# Patient Record
Sex: MALE | Race: ASIAN | NOT HISPANIC OR LATINO | URBAN - METROPOLITAN AREA
[De-identification: names, ages, dates, MRNs, and addresses within clinical notes are randomized per-mention and may not be internally consistent; named-entity substitution may affect disease eponyms.]

---

## 2019-04-01 ENCOUNTER — INPATIENT (INPATIENT)
Facility: HOSPITAL | Age: 74
LOS: 10 days | Discharge: HOME | DRG: 329 | End: 2019-04-12
Attending: SURGERY | Admitting: SURGERY
Payer: MEDICARE

## 2019-04-01 VITALS
TEMPERATURE: 98 F | WEIGHT: 134.92 LBS | DIASTOLIC BLOOD PRESSURE: 94 MMHG | RESPIRATION RATE: 18 BRPM | OXYGEN SATURATION: 100 % | SYSTOLIC BLOOD PRESSURE: 185 MMHG | HEIGHT: 67 IN | HEART RATE: 94 BPM

## 2019-04-01 DIAGNOSIS — K56.609 UNSPECIFIED INTESTINAL OBSTRUCTION, UNSPECIFIED AS TO PARTIAL VERSUS COMPLETE OBSTRUCTION: ICD-10-CM

## 2019-04-01 PROBLEM — Z00.00 ENCOUNTER FOR PREVENTIVE HEALTH EXAMINATION: Status: ACTIVE | Noted: 2019-04-01

## 2019-04-01 LAB
ALBUMIN SERPL ELPH-MCNC: 4.5 G/DL — SIGNIFICANT CHANGE UP (ref 3.3–5)
ALP SERPL-CCNC: 97 U/L — SIGNIFICANT CHANGE UP (ref 40–120)
ALT FLD-CCNC: 10 U/L — SIGNIFICANT CHANGE UP (ref 10–45)
ANION GAP SERPL CALC-SCNC: 13 MMOL/L — SIGNIFICANT CHANGE UP (ref 5–17)
APPEARANCE UR: CLEAR — SIGNIFICANT CHANGE UP
APPEARANCE UR: CLEAR — SIGNIFICANT CHANGE UP
APTT BLD: 31.5 SEC — SIGNIFICANT CHANGE UP (ref 27.5–36.3)
AST SERPL-CCNC: 12 U/L — SIGNIFICANT CHANGE UP (ref 10–40)
BACTERIA # UR AUTO: NEGATIVE — SIGNIFICANT CHANGE UP
BACTERIA # UR AUTO: NEGATIVE — SIGNIFICANT CHANGE UP
BASOPHILS # BLD AUTO: 0 K/UL — SIGNIFICANT CHANGE UP (ref 0–0.2)
BASOPHILS NFR BLD AUTO: 0.2 % — SIGNIFICANT CHANGE UP (ref 0–2)
BILIRUB SERPL-MCNC: 0.4 MG/DL — SIGNIFICANT CHANGE UP (ref 0.2–1.2)
BILIRUB UR-MCNC: NEGATIVE — SIGNIFICANT CHANGE UP
BILIRUB UR-MCNC: NEGATIVE — SIGNIFICANT CHANGE UP
BUN SERPL-MCNC: 16 MG/DL — SIGNIFICANT CHANGE UP (ref 7–23)
CALCIUM SERPL-MCNC: 9.7 MG/DL — SIGNIFICANT CHANGE UP (ref 8.4–10.5)
CHLORIDE SERPL-SCNC: 104 MMOL/L — SIGNIFICANT CHANGE UP (ref 96–108)
CO2 SERPL-SCNC: 24 MMOL/L — SIGNIFICANT CHANGE UP (ref 22–31)
COLOR SPEC: SIGNIFICANT CHANGE UP
COLOR SPEC: SIGNIFICANT CHANGE UP
CREAT SERPL-MCNC: 0.97 MG/DL — SIGNIFICANT CHANGE UP (ref 0.5–1.3)
DIFF PNL FLD: NEGATIVE — SIGNIFICANT CHANGE UP
DIFF PNL FLD: NEGATIVE — SIGNIFICANT CHANGE UP
EOSINOPHIL # BLD AUTO: 0.1 K/UL — SIGNIFICANT CHANGE UP (ref 0–0.5)
EOSINOPHIL NFR BLD AUTO: 1.3 % — SIGNIFICANT CHANGE UP (ref 0–6)
EPI CELLS # UR: 0 /HPF — SIGNIFICANT CHANGE UP
EPI CELLS # UR: 1 /HPF — SIGNIFICANT CHANGE UP
GAS PNL BLDV: SIGNIFICANT CHANGE UP
GLUCOSE SERPL-MCNC: 127 MG/DL — HIGH (ref 70–99)
GLUCOSE UR QL: NEGATIVE — SIGNIFICANT CHANGE UP
GLUCOSE UR QL: NEGATIVE — SIGNIFICANT CHANGE UP
HCT VFR BLD CALC: 33.4 % — LOW (ref 39–50)
HGB BLD-MCNC: 10.4 G/DL — LOW (ref 13–17)
HYALINE CASTS # UR AUTO: 0 /LPF — SIGNIFICANT CHANGE UP (ref 0–2)
HYALINE CASTS # UR AUTO: 1 /LPF — SIGNIFICANT CHANGE UP (ref 0–2)
INR BLD: 0.93 RATIO — SIGNIFICANT CHANGE UP (ref 0.88–1.16)
KETONES UR-MCNC: NEGATIVE — SIGNIFICANT CHANGE UP
KETONES UR-MCNC: NEGATIVE — SIGNIFICANT CHANGE UP
LEUKOCYTE ESTERASE UR-ACNC: NEGATIVE — SIGNIFICANT CHANGE UP
LEUKOCYTE ESTERASE UR-ACNC: NEGATIVE — SIGNIFICANT CHANGE UP
LIDOCAIN IGE QN: 18 U/L — SIGNIFICANT CHANGE UP (ref 7–60)
LYMPHOCYTES # BLD AUTO: 2.6 K/UL — SIGNIFICANT CHANGE UP (ref 1–3.3)
LYMPHOCYTES # BLD AUTO: 25 % — SIGNIFICANT CHANGE UP (ref 13–44)
MCHC RBC-ENTMCNC: 20.1 PG — LOW (ref 27–34)
MCHC RBC-ENTMCNC: 31.1 GM/DL — LOW (ref 32–36)
MCV RBC AUTO: 64.5 FL — LOW (ref 80–100)
MONOCYTES # BLD AUTO: 0.6 K/UL — SIGNIFICANT CHANGE UP (ref 0–0.9)
MONOCYTES NFR BLD AUTO: 5.7 % — SIGNIFICANT CHANGE UP (ref 2–14)
NEUTROPHILS # BLD AUTO: 7 K/UL — SIGNIFICANT CHANGE UP (ref 1.8–7.4)
NEUTROPHILS NFR BLD AUTO: 67.8 % — SIGNIFICANT CHANGE UP (ref 43–77)
NITRITE UR-MCNC: NEGATIVE — SIGNIFICANT CHANGE UP
NITRITE UR-MCNC: NEGATIVE — SIGNIFICANT CHANGE UP
PH UR: 6 — SIGNIFICANT CHANGE UP (ref 5–8)
PH UR: 6.5 — SIGNIFICANT CHANGE UP (ref 5–8)
PLATELET # BLD AUTO: 368 K/UL — SIGNIFICANT CHANGE UP (ref 150–400)
POTASSIUM SERPL-MCNC: 4.3 MMOL/L — SIGNIFICANT CHANGE UP (ref 3.5–5.3)
POTASSIUM SERPL-SCNC: 4.3 MMOL/L — SIGNIFICANT CHANGE UP (ref 3.5–5.3)
PROT SERPL-MCNC: 7.5 G/DL — SIGNIFICANT CHANGE UP (ref 6–8.3)
PROT UR-MCNC: ABNORMAL
PROT UR-MCNC: ABNORMAL
PROTHROM AB SERPL-ACNC: 10.6 SEC — SIGNIFICANT CHANGE UP (ref 10–12.9)
RBC # BLD: 5.17 M/UL — SIGNIFICANT CHANGE UP (ref 4.2–5.8)
RBC # FLD: 16.1 % — HIGH (ref 10.3–14.5)
RBC CASTS # UR COMP ASSIST: 1 /HPF — SIGNIFICANT CHANGE UP (ref 0–4)
RBC CASTS # UR COMP ASSIST: 2 /HPF — SIGNIFICANT CHANGE UP (ref 0–4)
SODIUM SERPL-SCNC: 141 MMOL/L — SIGNIFICANT CHANGE UP (ref 135–145)
SP GR SPEC: 1.03 — HIGH (ref 1.01–1.02)
SP GR SPEC: >1.05 (ref 1.01–1.02)
UROBILINOGEN FLD QL: NEGATIVE — SIGNIFICANT CHANGE UP
UROBILINOGEN FLD QL: NEGATIVE — SIGNIFICANT CHANGE UP
WBC # BLD: 10.4 K/UL — SIGNIFICANT CHANGE UP (ref 3.8–10.5)
WBC # FLD AUTO: 10.4 K/UL — SIGNIFICANT CHANGE UP (ref 3.8–10.5)
WBC UR QL: 1 /HPF — SIGNIFICANT CHANGE UP (ref 0–5)
WBC UR QL: 1 /HPF — SIGNIFICANT CHANGE UP (ref 0–5)

## 2019-04-01 PROCEDURE — 93010 ELECTROCARDIOGRAM REPORT: CPT | Mod: 59

## 2019-04-01 PROCEDURE — 99284 EMERGENCY DEPT VISIT MOD MDM: CPT | Mod: 25

## 2019-04-01 PROCEDURE — 74177 CT ABD & PELVIS W/CONTRAST: CPT | Mod: 26

## 2019-04-01 PROCEDURE — 71045 X-RAY EXAM CHEST 1 VIEW: CPT | Mod: 26

## 2019-04-01 PROCEDURE — 43753 TX GASTRO INTUB W/ASP: CPT

## 2019-04-01 RX ORDER — ONDANSETRON 8 MG/1
4 TABLET, FILM COATED ORAL ONCE
Qty: 0 | Refills: 0 | Status: COMPLETED | OUTPATIENT
Start: 2019-04-01 | End: 2019-04-01

## 2019-04-01 RX ORDER — MORPHINE SULFATE 50 MG/1
4 CAPSULE, EXTENDED RELEASE ORAL ONCE
Qty: 0 | Refills: 0 | Status: DISCONTINUED | OUTPATIENT
Start: 2019-04-01 | End: 2019-04-01

## 2019-04-01 RX ORDER — ONDANSETRON 8 MG/1
4 TABLET, FILM COATED ORAL ONCE
Qty: 0 | Refills: 0 | Status: DISCONTINUED | OUTPATIENT
Start: 2019-04-01 | End: 2019-04-04

## 2019-04-01 RX ORDER — ENOXAPARIN SODIUM 100 MG/ML
40 INJECTION SUBCUTANEOUS DAILY
Qty: 0 | Refills: 0 | Status: DISCONTINUED | OUTPATIENT
Start: 2019-04-01 | End: 2019-04-09

## 2019-04-01 RX ORDER — MORPHINE SULFATE 50 MG/1
4 CAPSULE, EXTENDED RELEASE ORAL ONCE
Qty: 0 | Refills: 0 | Status: DISCONTINUED | OUTPATIENT
Start: 2019-04-01 | End: 2019-04-04

## 2019-04-01 RX ORDER — MORPHINE SULFATE 50 MG/1
2 CAPSULE, EXTENDED RELEASE ORAL ONCE
Qty: 0 | Refills: 0 | Status: DISCONTINUED | OUTPATIENT
Start: 2019-04-01 | End: 2019-04-01

## 2019-04-01 RX ORDER — SODIUM CHLORIDE 9 MG/ML
1000 INJECTION INTRAMUSCULAR; INTRAVENOUS; SUBCUTANEOUS ONCE
Qty: 0 | Refills: 0 | Status: COMPLETED | OUTPATIENT
Start: 2019-04-01 | End: 2019-04-01

## 2019-04-01 RX ORDER — SODIUM CHLORIDE 9 MG/ML
1000 INJECTION, SOLUTION INTRAVENOUS
Qty: 0 | Refills: 0 | Status: DISCONTINUED | OUTPATIENT
Start: 2019-04-01 | End: 2019-04-05

## 2019-04-01 RX ADMIN — MORPHINE SULFATE 2 MILLIGRAM(S): 50 CAPSULE, EXTENDED RELEASE ORAL at 07:00

## 2019-04-01 RX ADMIN — MORPHINE SULFATE 4 MILLIGRAM(S): 50 CAPSULE, EXTENDED RELEASE ORAL at 11:41

## 2019-04-01 RX ADMIN — ONDANSETRON 4 MILLIGRAM(S): 8 TABLET, FILM COATED ORAL at 11:41

## 2019-04-01 RX ADMIN — SODIUM CHLORIDE 1000 MILLILITER(S): 9 INJECTION INTRAMUSCULAR; INTRAVENOUS; SUBCUTANEOUS at 10:25

## 2019-04-01 RX ADMIN — MORPHINE SULFATE 4 MILLIGRAM(S): 50 CAPSULE, EXTENDED RELEASE ORAL at 12:12

## 2019-04-01 RX ADMIN — MORPHINE SULFATE 2 MILLIGRAM(S): 50 CAPSULE, EXTENDED RELEASE ORAL at 08:41

## 2019-04-01 NOTE — H&P ADULT - ASSESSMENT
73M w/ no PMHx presents with acute abdominal pain x12 hours concerning for small bowel obstruction secondary to newly imaged 5 cm cecal mass.     - Admit to green team surgery under Dr. Bhat  - NPO + IVF  - NGT placed in ED, to LCWS  - No standing pain medication, requires surgical team evaluation prior to pain medication dosing   - No home medications  - Lovenox for DVT prophylaxis    Discussed with Dr. Perlita Marsh PGY-2  Surgery Pager [phone]

## 2019-04-01 NOTE — ED ADULT NURSE NOTE - OBJECTIVE STATEMENT
74y/o male walked into ED a&ox3 c/o abdominal pain. patient Tamazight speaking, MD Falcon translating and daughter at bedside assisting. Patient has been having abdominal pain intermittently for about a month. Reports last night pain became worse than usual, unable to sleep throughout the night. Described as aching pain to upper abdomen b/l. Denies fever, N/V/D, urinary symptoms, back pain, SOB, CP or any other complaints. Lungs clear b/l. Abd soft, nontender, nondistended. MD Falcon and MD Howell at bedside.

## 2019-04-01 NOTE — ED PROVIDER NOTE - CARE PLAN
Principal Discharge DX:	Small bowel obstruction  Secondary Diagnosis:	Malignant neoplasm of ascending colon

## 2019-04-01 NOTE — ED ADULT NURSE NOTE - NSIMPLEMENTINTERV_GEN_ALL_ED
Implemented All Universal Safety Interventions:  [place] to call system. Call bell, personal items and telephone within reach. Instruct patient to call for assistance. Room bathroom lighting operational. Non-slip footwear when patient is off stretcher. Physically safe environment: no spills, clutter or unnecessary equipment. Stretcher in lowest position, wheels locked, appropriate side rails in place.

## 2019-04-01 NOTE — H&P ADULT - ATTENDING COMMENTS
I have seen and examined the patient.  I agree with the surgical resident's note.  I have reviewed the CT scan.  I have discussed the treatment plan with the family and the patient.    Hugo Bhat contact information [phone]

## 2019-04-01 NOTE — ED ADULT NURSE REASSESSMENT NOTE - NS ED NURSE REASSESS COMMENT FT1
pt had NGT placed successfully and tolerated well. c/o 9/10 abdominal pain and discomfort, medicated with an additional 4 mg of morphine and 4 mg of zofran. VS stable. pt pending admission for mass found on ct scan. safety and fall precautions maintained. call bell at the bedside and within reach.

## 2019-04-01 NOTE — H&P ADULT - NSHPPHYSICALEXAM_GEN_ALL_CORE
General: NAD, resting comfortably  Resp: unlabored breathing on RA  CV: HDS, rrr  Abd: soft, generalized tenderness but worst in epigastrium, distended  Extr: wwp

## 2019-04-01 NOTE — H&P ADULT - HISTORY OF PRESENT ILLNESS
Patient seen and examined, full note to follow. Will place NGT, possible OR this week.     China Marsh PGY-2  Surgery Pager [phone] 73M w/ no PMHx presents with acute abdominal pain x12 hours. He states that the pain began last night around 8:30 PM and was generalized, worst in the epigastrium. He also noted that his abdomen was distended. 73M w/ no PMHx presents with acute abdominal pain x12 hours. He states that the pain began last night around 8:30 PM and was generalized, worst in the epigastrium. He also noted that his abdomen was distended and felt nauseated no emesis. He has never had this pain before. He denies any recent fevers, chills, CP/SOB, diarrhea/constipation. Last BM/flatus was yesterday PM. Patient has never had a colonoscopy.     In ED, patient's labs notable for leukocytosis to 10.4, venous lactate 1.7. CT abdomen pelvis demonstrates small bowel obstruction with transition point at level of ileocecal valve, associated with 5 cm cecal mass.

## 2019-04-01 NOTE — ED PROVIDER NOTE - PHYSICAL EXAMINATION
General: well-appearing elderly man in no acute distress  Head: normocephalic, atraumatic  Eyes: PERRL   Mouth: moist mucous membranes  Neck: supple neck  CV: normal rate and rhythm, normal S1 and S2  Respiratory: clear to auscultation bilaterally  Abdomen: soft, nontender, nondistended, bowel sounds present x 4 quadrants  Back: no midline tenderness to palpation, no CVAT  Neuro: alert and oriented x3, speech clear, moving all extremities spontaneously  Skin: no rash on abdomen or chest  Extremities: peripheral pulses 2+ bilaterally

## 2019-04-01 NOTE — ED PROVIDER NOTE - OBJECTIVE STATEMENT
72yo man presents with acute abdominal pain x 12 hours in setting of chronic abdominal pain x 1-2 months. His typical abdominal pain is intermittent but came to ED today because after dinner last night at 6:30, pt had continuous bilateral upper quadrant stabbing sensation throughout the night and was unable to sleep. He took 1g tylenol at 1030pm and then again at 3am with no relief. No fever or chills, no n/v/d, no dysuria, no hematuria. Normal BM yesterday with no blood in stool. No chest pain, no SOB, no back pain.

## 2019-04-01 NOTE — ED PROVIDER NOTE - ATTENDING CONTRIBUTION TO CARE
Patient presenting complaining of upper abdominal pains.  Pains have been ongoing intermittently for some time, usually responds to APAP at home, last night became more severe.  Sharp/stabbing in nature.  No associated nausea or vomiting, non radiating.  No prior evaluations or workups for etiology of pain.  No fevers, no chills.    A 14 point review of systems is negative except as in HPI or otherwise documented.    Exam:  General: Patient well appearing, vital signs within normal limits  HEENT: airway patent with moist mucous membranes  Cardiac: RRR S1/S2 with strong peripheral pulses  Respiratory: lungs clear without respiratory distress  GI: abdomen soft, non tender, non distended, unable to elicit symptoms on exam, no palpable masses  Neuro: no gross neurologic deficits  Skin: warm, well perfused  Psych: normal mood and affect    Patient presenting with epigastric/periumbilical abdominal pains not reproducible on exam, cause of pain unclear, however patient without good follow up and am concerned about patients reported severity of pain given exam - plan for labs, lactate, lipase, UA, pain control, CT A/P Patient presenting complaining of upper abdominal pains.  Pains have been ongoing intermittently for some time, usually responds to APAP at home, last night became more severe.  Sharp/stabbing in nature.  No associated nausea or vomiting, non radiating.  No prior evaluations or workups for etiology of pain.  No fevers, no chills.  Denying associated chest pains, shortness of breath, back pains.    A 14 point review of systems is negative except as in HPI or otherwise documented.    Exam:  General: Patient well appearing, vital signs within normal limits  HEENT: airway patent with moist mucous membranes  Cardiac: RRR S1/S2 with strong peripheral pulses  Respiratory: lungs clear without respiratory distress  GI: abdomen soft, non tender, non distended, unable to elicit symptoms on exam, no palpable masses  Neuro: no gross neurologic deficits  Skin: warm, well perfused  Psych: normal mood and affect    Patient presenting with epigastric/periumbilical abdominal pains not reproducible on exam, cause of pain unclear, however patient without good follow up and am concerned about patients reported severity of pain given exam - plan for labs, lactate, lipase, UA, pain control, CT A/P

## 2019-04-01 NOTE — ED PROVIDER NOTE - CLINICAL SUMMARY MEDICAL DECISION MAKING FREE TEXT BOX
72 yo man presents with abdominal pain of stabbing sensation that acutely worsened after eating and inability to sleep overnight and soft, nontender abdomen on exam. Will obtain cbc, cmp, lipase, vbg with lactate, and imaging to r/o abdominal pathology, may require cta to r/o mesenteric ischemia and will obtain ecg to assess or arrhythmia. Will pain control and continue to monitor and reassess.

## 2019-04-01 NOTE — ED PROVIDER NOTE - PROGRESS NOTE DETAILS
MD Mckinnon:  patient signed out to me by Dr. Howell.  74 yo M, c/o months diffuse abd pain; worse last night.  Location: upper > lower.  Better/worse: no clear modifiers.  Physical Exam unremarkable/nonfocal.  VS: +HTN.  DDx:  gastritis, pancreatitis, CA.  Plan:  labs, CT, ecg, UA (all pending). MD Mckinnon:  patient signed out to me by Dr. Howell.  74 yo M, c/o months diffuse abd pain; worse last night.  Location: upper > lower.  Better/worse: no clear modifiers.  Physical Exam unremarkable/nonfocal.  VS: +HTN.  DDx:  gastritis, pancreatitis, CA.  Hemoglobin 10.  Lactate normal.  ECG - unremarkable.  Plan:  f/u labs, CT, UA. PETTET- rectal exam chaperone romeo wnl, no masses or blood in rectum, updated family with results of ct scan and surg consulted.

## 2019-04-01 NOTE — H&P ADULT - NSHPLABSRESULTS_GEN_ALL_CORE
CBC ( @ 07:16)                          10.4<L>                   10.4    )--------------(  368        67.8  % Neuts, 25.0  % Lymphs, ANC: 7.0                             33.4<L>    BMP ( @ 07:16)       141     |  104     |  16    			Ca++ --      Ca 9.7          ---------------------------------( 127<H>		Mg --           4.3     |  24      |  0.97  			Ph --        LFTs ( @ 07:16)      TPro 7.5 / Alb 4.5 / TBili 0.4 / DBili -- / AST 12 / ALT 10 / AlkPhos 97    Coags ( @ 07:16)  aPTT 31.5 / INR 0.93 / PT 10.6        VBG ( @ 07:16)     7.40 / 44 / 44 / 27 / 2.4<H> / 71%      Lactate: 1.7    Urinalysis ( @ 09:48):     Color: Light Yellow / Appearance: Clear / S.031<H> / pH: 6.0 / Gluc: Negative / Ketones: Negative / Bili: Negative / Urobili: Negative / Protein :30 mg/dL<!> / Nitrites: Negative / Leuk.Est: Negative / RBC: 2 / WBC: 1 / Sq Epi:  / Non Sq Epi: 0 / Bacteria Negative    < from: CT Abdomen and Pelvis w/ Oral Cont and w/ IV Cont (19 @ 08:44) >    *** ADDENDUM 2019  ***    Correction of transcriptional error:    *  Small bowel obstruction secondary to 5 cm cecal mass at the level of   the  ileocecal valve.      *** END OF ADDENDUM 2019  ***      PROCEDURE DATE:  2019      INTERPRETATION:  CLINICAL INFORMATION: Abdominal pain. Upper abdominal   pain.         COMPARISON: None.    PROCEDURE:   CT of the Abdomen and Pelvis was performed with intravenous contrast.   Intravenous contrast: 90 ml Omnipaque 350. 10 ml discarded.  Oral contrast: positive contrast was administered.  Sagittal and coronal reformats were performed.    FINDINGS:    LOWER CHEST: Within normal limits.    LIVER: Within normal limits.  BILE DUCTS: Normal caliber.  GALLBLADDER: Within normal limits.  SPLEEN: Within normal limits.  PANCREAS: Within normal limits.  ADRENALS: Within normal limits.  KIDNEYS/URETERS: Left renal hypodensity too small to characterize, likely   a cyst. Otherwise within normal limits.    BLADDER: Within normal limits.  REPRODUCTIVE ORGANS: Enlarged prostate.    BOWEL: There is dilatation of distal small bowel loops with fecaliization   of the distal ileal contents to the level of the cecum where 5 cm mass is   present at the level of the ileocecal valve. There is increase in size   and number of mesenteric lymph nodes in the right lower quadrant the   largest measuring 1.1 cm (3:62) and 0.9 cm (3:58). Appendix is normal.   Diverticulosis.  PERITONEUM: Trace pelvic ascites. No pneumoperitoneum.  VESSELS:  Atherosclerotic calcifications.  RETROPERITONEUM: No lymphadenopathy.    ABDOMINAL WALL: Within normal limits.  BONES: Within normal limits.    IMPRESSION:     *  Small bowel obstruction secondary to 5 cm stable mass at the level of   the ileocecal valve.  *  Right lower quadrant is apparent adenopathy. No evidence of metastatic   disease in the abdomen and pelvis.  *  Trace pelvic ascites.    ***Please see the addendum at the top of this report. It may contain   additional important information or changes.****    ROMAN ROSE M.D., ATTENDING RADIOLOGIST  This document has been electronically signed. 2019  9:13AM  Addend:  ROMAN ROSE M.D., ATTENDING RADIOLOGIST  This addendum was electronically signed on: 2019  9:49AM.        < end of copied text >

## 2019-04-02 PROBLEM — Z78.9 OTHER SPECIFIED HEALTH STATUS: Chronic | Status: ACTIVE | Noted: 2019-04-01

## 2019-04-02 LAB
ANION GAP SERPL CALC-SCNC: 11 MMOL/L — SIGNIFICANT CHANGE UP (ref 5–17)
APTT BLD: 28.9 SEC — SIGNIFICANT CHANGE UP (ref 27.5–36.3)
APTT BLD: 29.4 SEC — SIGNIFICANT CHANGE UP (ref 27.5–36.3)
BLD GP AB SCN SERPL QL: NEGATIVE — SIGNIFICANT CHANGE UP
BUN SERPL-MCNC: 11 MG/DL — SIGNIFICANT CHANGE UP (ref 7–23)
CALCIUM SERPL-MCNC: 8.6 MG/DL — SIGNIFICANT CHANGE UP (ref 8.4–10.5)
CEA SERPL-MCNC: 0.9 NG/ML — SIGNIFICANT CHANGE UP (ref 0–3.8)
CHLORIDE SERPL-SCNC: 101 MMOL/L — SIGNIFICANT CHANGE UP (ref 96–108)
CO2 SERPL-SCNC: 26 MMOL/L — SIGNIFICANT CHANGE UP (ref 22–31)
CREAT SERPL-MCNC: 0.92 MG/DL — SIGNIFICANT CHANGE UP (ref 0.5–1.3)
CULTURE RESULTS: SIGNIFICANT CHANGE UP
GLUCOSE SERPL-MCNC: 96 MG/DL — SIGNIFICANT CHANGE UP (ref 70–99)
HCT VFR BLD CALC: 27.6 % — LOW (ref 39–50)
HGB BLD-MCNC: 9.2 G/DL — LOW (ref 13–17)
INR BLD: 1.03 RATIO — SIGNIFICANT CHANGE UP (ref 0.88–1.16)
INR BLD: 1.03 RATIO — SIGNIFICANT CHANGE UP (ref 0.88–1.16)
MAGNESIUM SERPL-MCNC: 2 MG/DL — SIGNIFICANT CHANGE UP (ref 1.6–2.6)
MCHC RBC-ENTMCNC: 21.6 PG — LOW (ref 27–34)
MCHC RBC-ENTMCNC: 33.2 GM/DL — SIGNIFICANT CHANGE UP (ref 32–36)
MCV RBC AUTO: 65.1 FL — LOW (ref 80–100)
PHOSPHATE SERPL-MCNC: 2.2 MG/DL — LOW (ref 2.5–4.5)
PLATELET # BLD AUTO: 296 K/UL — SIGNIFICANT CHANGE UP (ref 150–400)
POTASSIUM SERPL-MCNC: 3.7 MMOL/L — SIGNIFICANT CHANGE UP (ref 3.5–5.3)
POTASSIUM SERPL-SCNC: 3.7 MMOL/L — SIGNIFICANT CHANGE UP (ref 3.5–5.3)
PROTHROM AB SERPL-ACNC: 11.8 SEC — SIGNIFICANT CHANGE UP (ref 10–12.9)
PROTHROM AB SERPL-ACNC: 11.8 SEC — SIGNIFICANT CHANGE UP (ref 10–12.9)
RBC # BLD: 4.24 M/UL — SIGNIFICANT CHANGE UP (ref 4.2–5.8)
RBC # FLD: 16.1 % — HIGH (ref 10.3–14.5)
RH IG SCN BLD-IMP: POSITIVE — SIGNIFICANT CHANGE UP
SODIUM SERPL-SCNC: 138 MMOL/L — SIGNIFICANT CHANGE UP (ref 135–145)
SPECIMEN SOURCE: SIGNIFICANT CHANGE UP
WBC # BLD: 8.9 K/UL — SIGNIFICANT CHANGE UP (ref 3.8–10.5)
WBC # FLD AUTO: 8.9 K/UL — SIGNIFICANT CHANGE UP (ref 3.8–10.5)

## 2019-04-02 PROCEDURE — 71250 CT THORAX DX C-: CPT | Mod: 26

## 2019-04-02 RX ORDER — POTASSIUM PHOSPHATE, MONOBASIC POTASSIUM PHOSPHATE, DIBASIC 236; 224 MG/ML; MG/ML
15 INJECTION, SOLUTION INTRAVENOUS ONCE
Qty: 0 | Refills: 0 | Status: COMPLETED | OUTPATIENT
Start: 2019-04-02 | End: 2019-04-02

## 2019-04-02 RX ADMIN — POTASSIUM PHOSPHATE, MONOBASIC POTASSIUM PHOSPHATE, DIBASIC 62.5 MILLIMOLE(S): 236; 224 INJECTION, SOLUTION INTRAVENOUS at 10:47

## 2019-04-02 RX ADMIN — ENOXAPARIN SODIUM 40 MILLIGRAM(S): 100 INJECTION SUBCUTANEOUS at 11:55

## 2019-04-02 NOTE — CHART NOTE - NSCHARTNOTEFT_GEN_A_CORE
Surgery Preop Note    Patient is a 73y old Male who presents with small bowel obstruction secondary to a cecal mass  Diagnosis: small bowel obstruction secondary to a cecal mass  Procedure: laparoscopic right colectomy (ERAS)  Surgeon: Dr. Painter                        9.2    8.9   )-----------( 296      ( 02 Apr 2019 06:42 )             27.6     04-02    138  |  101  |  11  ----------------------------<  96  3.7   |  26  |  0.92    Ca    8.6      02 Apr 2019 06:42  Phos  2.2     04-02  Mg     2.0     04-02    TPro  7.5  /  Alb  4.5  /  TBili  0.4  /  DBili  x   /  AST  12  /  ALT  10  /  AlkPhos  97  04-01    PT/INR - ( 02 Apr 2019 11:02 )   PT: 11.8 sec;   INR: 1.03 ratio         PTT - ( 02 Apr 2019 11:02 )  PTT:29.4 sec  ABO Interpretation: A (04-02 @ 06:50)    Urinalysis Basic - ( 01 Apr 2019 18:55 )    Color: Light Yellow / Appearance: Clear / SG: >1.050 / pH: x  Gluc: x / Ketone: Negative  / Bili: Negative / Urobili: Negative   Blood: x / Protein: Trace / Nitrite: Negative   Leuk Esterase: Negative / RBC: 1 /hpf / WBC 1 /HPF   Sq Epi: x / Non Sq Epi: 1 /hpf / Bacteria: Negative    CT chest: No evidence of intrathoracic metastatic disease.  Chest X RAY: Enteric tube terminates in the stomach, likely within the pylorus.  Clear lungs.  EKG: NSR     MEDICATIONS  (STANDING):  enoxaparin Injectable 40 milliGRAM(s) SubCutaneous daily  lactated ringers. 1000 milliLiter(s) (100 mL/Hr) IV Continuous <Continuous>  morphine  - Injectable 4 milliGRAM(s) IV Push once  ondansetron Injectable 4 milliGRAM(s) IV Push once    MEDICATIONS  (PRN):      Assessment & Plan:  73y Male with   NPO after midnight, IVF  NGT to WS  F/u AM Labs (5am stat labs)   OR tomorrow for lap right colectomy  Pain Control  DVT prophylaxis

## 2019-04-02 NOTE — PROGRESS NOTE ADULT - ATTENDING COMMENTS
I saw and examined the pt and discussed the tx plan with the House Staff. I agree with the exam and plan as documented in the surgery resident's note from today.  Comfortable.  Abdomen soft, NT, mildly distended.  keep NGT, NPO, IVFs.  Staging non-con chest CT today to complete cancer staging.  Looking for OR time, tentatively for wedn afternoon.  Sarah Painter MD I saw and examined the pt and discussed the tx plan with the House Staff. I agree with the exam and plan as documented in the surgery resident's note from today, with changes in exam assessment as below.  Comfortable.  Abdomen soft, NT, mildly distended.  keep NGT, NPO, IVFs.  Staging non-con chest CT today to complete cancer staging.  Looking for OR time, tentatively for wedn afternoon.  Sarah Painter MD

## 2019-04-02 NOTE — PROGRESS NOTE ADULT - ASSESSMENT
73M w/ no PMHx presents with acute abdominal pain concerning for small bowel obstruction secondary to newly imaged 5 cm cecal mass.     - NPO + IVF  - NGT  - No standing pain medication, requires surgical team evaluation prior to pain medication dosing   - No home medications  - Lovenox for DVT prophylaxis    Surgery Pager [phone]

## 2019-04-02 NOTE — PROGRESS NOTE ADULT - SUBJECTIVE AND OBJECTIVE BOX
GENERAL SURGERY DAILY PROGRESS NOTE:       Subjective:  Pt seen and examined at bedside. No acute events overnight. Pain controlled. Tolerating diet. (-)N/V, (+) flatus/BM.         Objective:    PE:  Gen: resting comfortably in bed, NAD  Resp: breathing comfortably  Abd: soft, generalized tenderness but worst in epigastrium, distended    Vital Signs Last 24 Hrs  T(C): 36.7 (02 Apr 2019 01:08), Max: 36.8 (01 Apr 2019 18:15)  T(F): 98.1 (02 Apr 2019 01:08), Max: 98.3 (01 Apr 2019 18:15)  HR: 77 (02 Apr 2019 01:08) (76 - 94)  BP: 147/78 (02 Apr 2019 01:08) (147/78 - 185/94)  BP(mean): --  RR: 18 (02 Apr 2019 01:08) (16 - 18)  SpO2: 95% (02 Apr 2019 01:08) (94% - 100%)    I&O's Detail    01 Apr 2019 07:01  -  02 Apr 2019 05:15  --------------------------------------------------------  IN:    lactated ringers.: 1400 mL  Total IN: 1400 mL    OUT:    Intermittent Catheterization - Urethral: 500 mL    Nasoenteral Tube: 500 mL    Voided: 500 mL  Total OUT: 1500 mL    Total NET: -100 mL          Daily Height in cm: 170.18 (01 Apr 2019 06:32)    Daily     MEDICATIONS  (STANDING):  enoxaparin Injectable 40 milliGRAM(s) SubCutaneous daily  lactated ringers. 1000 milliLiter(s) (100 mL/Hr) IV Continuous <Continuous>  morphine  - Injectable 4 milliGRAM(s) IV Push once  ondansetron Injectable 4 milliGRAM(s) IV Push once    MEDICATIONS  (PRN):      LABS:                        10.4   10.4  )-----------( 368      ( 01 Apr 2019 07:16 )             33.4     04-01    141  |  104  |  16  ----------------------------<  127<H>  4.3   |  24  |  0.97    Ca    9.7      01 Apr 2019 07:16    TPro  7.5  /  Alb  4.5  /  TBili  0.4  /  DBili  x   /  AST  12  /  ALT  10  /  AlkPhos  97  04-01    PT/INR - ( 01 Apr 2019 07:16 )   PT: 10.6 sec;   INR: 0.93 ratio         PTT - ( 01 Apr 2019 07:16 )  PTT:31.5 sec  Urinalysis Basic - ( 01 Apr 2019 18:55 )    Color: Light Yellow / Appearance: Clear / SG: >1.050 / pH: x  Gluc: x / Ketone: Negative  / Bili: Negative / Urobili: Negative   Blood: x / Protein: Trace / Nitrite: Negative   Leuk Esterase: Negative / RBC: 1 /hpf / WBC 1 /HPF   Sq Epi: x / Non Sq Epi: 1 /hpf / Bacteria: Negative        RADIOLOGY & ADDITIONAL STUDIES: GENERAL SURGERY DAILY PROGRESS NOTE:       Subjective:  Pt seen and examined at bedside. No acute events overnight. Pain controlled. (-)N/V, (-) flatus/BM.         Objective:    PE:  Gen: resting comfortably in bed, NAD  Resp: breathing comfortably  Abd: soft, generalized tenderness but worst in epigastrium, distended    Vital Signs Last 24 Hrs  T(C): 36.7 (02 Apr 2019 01:08), Max: 36.8 (01 Apr 2019 18:15)  T(F): 98.1 (02 Apr 2019 01:08), Max: 98.3 (01 Apr 2019 18:15)  HR: 77 (02 Apr 2019 01:08) (76 - 94)  BP: 147/78 (02 Apr 2019 01:08) (147/78 - 185/94)  BP(mean): --  RR: 18 (02 Apr 2019 01:08) (16 - 18)  SpO2: 95% (02 Apr 2019 01:08) (94% - 100%)    I&O's Detail    01 Apr 2019 07:01  -  02 Apr 2019 05:15  --------------------------------------------------------  IN:    lactated ringers.: 1400 mL  Total IN: 1400 mL    OUT:    Intermittent Catheterization - Urethral: 500 mL    Nasoenteral Tube: 500 mL    Voided: 500 mL  Total OUT: 1500 mL    Total NET: -100 mL          Daily Height in cm: 170.18 (01 Apr 2019 06:32)    Daily     MEDICATIONS  (STANDING):  enoxaparin Injectable 40 milliGRAM(s) SubCutaneous daily  lactated ringers. 1000 milliLiter(s) (100 mL/Hr) IV Continuous <Continuous>  morphine  - Injectable 4 milliGRAM(s) IV Push once  ondansetron Injectable 4 milliGRAM(s) IV Push once    MEDICATIONS  (PRN):      LABS:                        10.4   10.4  )-----------( 368      ( 01 Apr 2019 07:16 )             33.4     04-01    141  |  104  |  16  ----------------------------<  127<H>  4.3   |  24  |  0.97    Ca    9.7      01 Apr 2019 07:16    TPro  7.5  /  Alb  4.5  /  TBili  0.4  /  DBili  x   /  AST  12  /  ALT  10  /  AlkPhos  97  04-01    PT/INR - ( 01 Apr 2019 07:16 )   PT: 10.6 sec;   INR: 0.93 ratio         PTT - ( 01 Apr 2019 07:16 )  PTT:31.5 sec  Urinalysis Basic - ( 01 Apr 2019 18:55 )    Color: Light Yellow / Appearance: Clear / SG: >1.050 / pH: x  Gluc: x / Ketone: Negative  / Bili: Negative / Urobili: Negative   Blood: x / Protein: Trace / Nitrite: Negative   Leuk Esterase: Negative / RBC: 1 /hpf / WBC 1 /HPF   Sq Epi: x / Non Sq Epi: 1 /hpf / Bacteria: Negative        RADIOLOGY & ADDITIONAL STUDIES:

## 2019-04-03 ENCOUNTER — APPOINTMENT (OUTPATIENT)
Dept: SURGERY | Facility: HOSPITAL | Age: 74
End: 2019-04-03

## 2019-04-03 LAB
ANION GAP SERPL CALC-SCNC: 12 MMOL/L — SIGNIFICANT CHANGE UP (ref 5–17)
ANION GAP SERPL CALC-SCNC: 15 MMOL/L — SIGNIFICANT CHANGE UP (ref 5–17)
ANION GAP SERPL CALC-SCNC: 17 MMOL/L — SIGNIFICANT CHANGE UP (ref 5–17)
APPEARANCE UR: ABNORMAL
APTT BLD: 31.8 SEC — SIGNIFICANT CHANGE UP (ref 27.5–36.3)
BACTERIA # UR AUTO: ABNORMAL
BILIRUB UR-MCNC: NEGATIVE — SIGNIFICANT CHANGE UP
BLD GP AB SCN SERPL QL: NEGATIVE — SIGNIFICANT CHANGE UP
BUN SERPL-MCNC: 13 MG/DL — SIGNIFICANT CHANGE UP (ref 7–23)
BUN SERPL-MCNC: 14 MG/DL — SIGNIFICANT CHANGE UP (ref 7–23)
BUN SERPL-MCNC: 14 MG/DL — SIGNIFICANT CHANGE UP (ref 7–23)
CALCIUM SERPL-MCNC: 8 MG/DL — LOW (ref 8.4–10.5)
CALCIUM SERPL-MCNC: 8.7 MG/DL — SIGNIFICANT CHANGE UP (ref 8.4–10.5)
CALCIUM SERPL-MCNC: 9.3 MG/DL — SIGNIFICANT CHANGE UP (ref 8.4–10.5)
CHLORIDE SERPL-SCNC: 101 MMOL/L — SIGNIFICANT CHANGE UP (ref 96–108)
CHLORIDE SERPL-SCNC: 98 MMOL/L — SIGNIFICANT CHANGE UP (ref 96–108)
CHLORIDE SERPL-SCNC: 98 MMOL/L — SIGNIFICANT CHANGE UP (ref 96–108)
CO2 SERPL-SCNC: 23 MMOL/L — SIGNIFICANT CHANGE UP (ref 22–31)
CO2 SERPL-SCNC: 24 MMOL/L — SIGNIFICANT CHANGE UP (ref 22–31)
CO2 SERPL-SCNC: 25 MMOL/L — SIGNIFICANT CHANGE UP (ref 22–31)
COLOR SPEC: YELLOW — SIGNIFICANT CHANGE UP
CREAT SERPL-MCNC: 0.9 MG/DL — SIGNIFICANT CHANGE UP (ref 0.5–1.3)
CREAT SERPL-MCNC: 0.91 MG/DL — SIGNIFICANT CHANGE UP (ref 0.5–1.3)
CREAT SERPL-MCNC: 0.94 MG/DL — SIGNIFICANT CHANGE UP (ref 0.5–1.3)
DIFF PNL FLD: ABNORMAL
EPI CELLS # UR: 1 /HPF — SIGNIFICANT CHANGE UP
GLUCOSE SERPL-MCNC: 104 MG/DL — HIGH (ref 70–99)
GLUCOSE SERPL-MCNC: 111 MG/DL — HIGH (ref 70–99)
GLUCOSE SERPL-MCNC: 84 MG/DL — SIGNIFICANT CHANGE UP (ref 70–99)
GLUCOSE UR QL: NEGATIVE — SIGNIFICANT CHANGE UP
HCT VFR BLD CALC: 27.7 % — LOW (ref 39–50)
HCT VFR BLD CALC: 31 % — LOW (ref 39–50)
HCT VFR BLD CALC: 32.2 % — LOW (ref 39–50)
HGB BLD-MCNC: 8.2 G/DL — LOW (ref 13–17)
HGB BLD-MCNC: 9.6 G/DL — LOW (ref 13–17)
HGB BLD-MCNC: 9.8 G/DL — LOW (ref 13–17)
HYALINE CASTS # UR AUTO: 2 /LPF — SIGNIFICANT CHANGE UP (ref 0–2)
INR BLD: 1.1 RATIO — SIGNIFICANT CHANGE UP (ref 0.88–1.16)
KETONES UR-MCNC: ABNORMAL
LACTATE SERPL-SCNC: 1.5 MMOL/L — SIGNIFICANT CHANGE UP (ref 0.7–2)
LEUKOCYTE ESTERASE UR-ACNC: ABNORMAL
MAGNESIUM SERPL-MCNC: 1.9 MG/DL — SIGNIFICANT CHANGE UP (ref 1.6–2.6)
MAGNESIUM SERPL-MCNC: 2.1 MG/DL — SIGNIFICANT CHANGE UP (ref 1.6–2.6)
MAGNESIUM SERPL-MCNC: 2.2 MG/DL — SIGNIFICANT CHANGE UP (ref 1.6–2.6)
MCHC RBC-ENTMCNC: 19 PG — LOW (ref 27–34)
MCHC RBC-ENTMCNC: 19.1 PG — LOW (ref 27–34)
MCHC RBC-ENTMCNC: 20.3 PG — LOW (ref 27–34)
MCHC RBC-ENTMCNC: 29.8 GM/DL — LOW (ref 32–36)
MCHC RBC-ENTMCNC: 30 GM/DL — LOW (ref 32–36)
MCHC RBC-ENTMCNC: 31.5 GM/DL — LOW (ref 32–36)
MCV RBC AUTO: 63.6 FL — LOW (ref 80–100)
MCV RBC AUTO: 63.9 FL — LOW (ref 80–100)
MCV RBC AUTO: 64.2 FL — LOW (ref 80–100)
NITRITE UR-MCNC: POSITIVE
PH UR: 8 — SIGNIFICANT CHANGE UP (ref 5–8)
PHOSPHATE SERPL-MCNC: 2.1 MG/DL — LOW (ref 2.5–4.5)
PHOSPHATE SERPL-MCNC: 2.2 MG/DL — LOW (ref 2.5–4.5)
PHOSPHATE SERPL-MCNC: 2.6 MG/DL — SIGNIFICANT CHANGE UP (ref 2.5–4.5)
PLATELET # BLD AUTO: 267 K/UL — SIGNIFICANT CHANGE UP (ref 150–400)
PLATELET # BLD AUTO: 311 K/UL — SIGNIFICANT CHANGE UP (ref 150–400)
PLATELET # BLD AUTO: 320 K/UL — SIGNIFICANT CHANGE UP (ref 150–400)
POTASSIUM SERPL-MCNC: 3.4 MMOL/L — LOW (ref 3.5–5.3)
POTASSIUM SERPL-MCNC: 3.5 MMOL/L — SIGNIFICANT CHANGE UP (ref 3.5–5.3)
POTASSIUM SERPL-MCNC: 3.5 MMOL/L — SIGNIFICANT CHANGE UP (ref 3.5–5.3)
POTASSIUM SERPL-SCNC: 3.4 MMOL/L — LOW (ref 3.5–5.3)
POTASSIUM SERPL-SCNC: 3.5 MMOL/L — SIGNIFICANT CHANGE UP (ref 3.5–5.3)
POTASSIUM SERPL-SCNC: 3.5 MMOL/L — SIGNIFICANT CHANGE UP (ref 3.5–5.3)
PROT UR-MCNC: ABNORMAL
PROTHROM AB SERPL-ACNC: 12.7 SEC — SIGNIFICANT CHANGE UP (ref 10–12.9)
RBC # BLD: 4.33 M/UL — SIGNIFICANT CHANGE UP (ref 4.2–5.8)
RBC # BLD: 4.83 M/UL — SIGNIFICANT CHANGE UP (ref 4.2–5.8)
RBC # BLD: 5.06 M/UL — SIGNIFICANT CHANGE UP (ref 4.2–5.8)
RBC # FLD: 16.2 % — HIGH (ref 10.3–14.5)
RBC CASTS # UR COMP ASSIST: 6 /HPF — HIGH (ref 0–4)
RH IG SCN BLD-IMP: POSITIVE — SIGNIFICANT CHANGE UP
SODIUM SERPL-SCNC: 136 MMOL/L — SIGNIFICANT CHANGE UP (ref 135–145)
SODIUM SERPL-SCNC: 138 MMOL/L — SIGNIFICANT CHANGE UP (ref 135–145)
SODIUM SERPL-SCNC: 139 MMOL/L — SIGNIFICANT CHANGE UP (ref 135–145)
SP GR SPEC: 1.02 — SIGNIFICANT CHANGE UP (ref 1.01–1.02)
UROBILINOGEN FLD QL: NEGATIVE — SIGNIFICANT CHANGE UP
WBC # BLD: 15.1 K/UL — HIGH (ref 3.8–10.5)
WBC # BLD: 17.7 K/UL — HIGH (ref 3.8–10.5)
WBC # BLD: 18.9 K/UL — HIGH (ref 3.8–10.5)
WBC # FLD AUTO: 15.1 K/UL — HIGH (ref 3.8–10.5)
WBC # FLD AUTO: 17.7 K/UL — HIGH (ref 3.8–10.5)
WBC # FLD AUTO: 18.9 K/UL — HIGH (ref 3.8–10.5)
WBC UR QL: 181 /HPF — HIGH (ref 0–5)

## 2019-04-03 PROCEDURE — 71045 X-RAY EXAM CHEST 1 VIEW: CPT | Mod: 26

## 2019-04-03 PROCEDURE — 74176 CT ABD & PELVIS W/O CONTRAST: CPT | Mod: 26

## 2019-04-03 PROCEDURE — 93010 ELECTROCARDIOGRAM REPORT: CPT

## 2019-04-03 PROCEDURE — 99223 1ST HOSP IP/OBS HIGH 75: CPT

## 2019-04-03 RX ORDER — PHENAZOPYRIDINE HCL 100 MG
100 TABLET ORAL EVERY 8 HOURS
Qty: 0 | Refills: 0 | Status: DISCONTINUED | OUTPATIENT
Start: 2019-04-03 | End: 2019-04-09

## 2019-04-03 RX ORDER — SODIUM CHLORIDE 9 MG/ML
1000 INJECTION, SOLUTION INTRAVENOUS ONCE
Qty: 0 | Refills: 0 | Status: COMPLETED | OUTPATIENT
Start: 2019-04-03 | End: 2019-04-03

## 2019-04-03 RX ORDER — PANTOPRAZOLE SODIUM 20 MG/1
40 TABLET, DELAYED RELEASE ORAL DAILY
Qty: 0 | Refills: 0 | Status: DISCONTINUED | OUTPATIENT
Start: 2019-04-03 | End: 2019-04-09

## 2019-04-03 RX ORDER — SODIUM CHLORIDE 9 MG/ML
500 INJECTION, SOLUTION INTRAVENOUS ONCE
Qty: 0 | Refills: 0 | Status: COMPLETED | OUTPATIENT
Start: 2019-04-03 | End: 2019-04-03

## 2019-04-03 RX ORDER — ACETAMINOPHEN 500 MG
1000 TABLET ORAL ONCE
Qty: 0 | Refills: 0 | Status: COMPLETED | OUTPATIENT
Start: 2019-04-03 | End: 2019-04-03

## 2019-04-03 RX ORDER — MAGNESIUM SULFATE 500 MG/ML
2 VIAL (ML) INJECTION ONCE
Qty: 0 | Refills: 0 | Status: COMPLETED | OUTPATIENT
Start: 2019-04-03 | End: 2019-04-03

## 2019-04-03 RX ORDER — PIPERACILLIN AND TAZOBACTAM 4; .5 G/20ML; G/20ML
3.38 INJECTION, POWDER, LYOPHILIZED, FOR SOLUTION INTRAVENOUS EVERY 8 HOURS
Qty: 0 | Refills: 0 | Status: DISCONTINUED | OUTPATIENT
Start: 2019-04-03 | End: 2019-04-05

## 2019-04-03 RX ORDER — POTASSIUM PHOSPHATE, MONOBASIC POTASSIUM PHOSPHATE, DIBASIC 236; 224 MG/ML; MG/ML
30 INJECTION, SOLUTION INTRAVENOUS ONCE
Qty: 0 | Refills: 0 | Status: COMPLETED | OUTPATIENT
Start: 2019-04-03 | End: 2019-04-03

## 2019-04-03 RX ADMIN — Medication 400 MILLIGRAM(S): at 10:48

## 2019-04-03 RX ADMIN — ENOXAPARIN SODIUM 40 MILLIGRAM(S): 100 INJECTION SUBCUTANEOUS at 13:27

## 2019-04-03 RX ADMIN — POTASSIUM PHOSPHATE, MONOBASIC POTASSIUM PHOSPHATE, DIBASIC 83.33 MILLIMOLE(S): 236; 224 INJECTION, SOLUTION INTRAVENOUS at 13:27

## 2019-04-03 RX ADMIN — SODIUM CHLORIDE 1000 MILLILITER(S): 9 INJECTION, SOLUTION INTRAVENOUS at 16:24

## 2019-04-03 RX ADMIN — SODIUM CHLORIDE 2000 MILLILITER(S): 9 INJECTION, SOLUTION INTRAVENOUS at 10:33

## 2019-04-03 RX ADMIN — Medication 50 GRAM(S): at 13:28

## 2019-04-03 RX ADMIN — Medication 400 MILLIGRAM(S): at 22:47

## 2019-04-03 RX ADMIN — SODIUM CHLORIDE 1000 MILLILITER(S): 9 INJECTION, SOLUTION INTRAVENOUS at 23:06

## 2019-04-03 RX ADMIN — Medication 100 MILLIGRAM(S): at 15:25

## 2019-04-03 RX ADMIN — PIPERACILLIN AND TAZOBACTAM 25 GRAM(S): 4; .5 INJECTION, POWDER, LYOPHILIZED, FOR SOLUTION INTRAVENOUS at 21:44

## 2019-04-03 RX ADMIN — PIPERACILLIN AND TAZOBACTAM 25 GRAM(S): 4; .5 INJECTION, POWDER, LYOPHILIZED, FOR SOLUTION INTRAVENOUS at 13:28

## 2019-04-03 RX ADMIN — PANTOPRAZOLE SODIUM 40 MILLIGRAM(S): 20 TABLET, DELAYED RELEASE ORAL at 13:25

## 2019-04-03 NOTE — PROGRESS NOTE ADULT - ASSESSMENT
73M w/ no PMHx presents with acute abdominal pain concerning for small bowel obstruction secondary to newly imaged 5 cm cecal mass.     - NPO + IVF for OR today @ 14.30 for lap right colectomy.  - NGT  - No standing pain medication, requires surgical team evaluation prior to pain medication dosing   - No home medications  - Lovenox for DVT prophylaxis    Surgery Pager [phone]

## 2019-04-03 NOTE — PROGRESS NOTE ADULT - SUBJECTIVE AND OBJECTIVE BOX
Patient now w shaking chills, tacchycardia to110, fever 101.3 and WBC 15K.  C/o dysuria, frequency.  Abdomen soft, non-tender  UA suggestive of UTI.  Abx started.  Surgery to be postponed in light of septic picture.

## 2019-04-03 NOTE — PROGRESS NOTE ADULT - ATTENDING COMMENTS
I saw and examined the pt and discussed the tx plan with the House Staff. I agree with the exam and plan as documented in the surgery resident's note from today which I edited as indicated.  C/o urgency. Tachy the last few hrs. Denies abd pain. + flatus x1. NGT just fell out.  Abdomen soft, mildly distended, NT.  Bladder scan post voiding 40.   WBC 18.  ? UTI.  Give bolus, urine Cx sent, start Zosyn, do ECG. Replace NGT.  Continue as of now with plans for colectomy today.  Sarah Painter MD

## 2019-04-03 NOTE — CONSULT NOTE ADULT - SUBJECTIVE AND OBJECTIVE BOX
HISTORY OF PRESENT ILLNESS:     In ED, patient's labs notable for leukocytosis to 10.4, venous lactate 1.7. CT abdomen pelvis demonstrates small bowel obstruction with transition point at level of ileocecal valve, associated with 5 cm cecal mass.  Pt was admitted to surgery, had an NGT placed and started on IVF for maintenance.  Pt was scheduled for surgery on 4/3/2019.  Over night last night pt with tachycardia with recorded HR to 104-115.  Pt has remained hemodynamically stable and this am pt became febrile to 101.5, however has remained normotensive.  Pt was complaining of suprapubic pain, hesitancy, urgency and frequency and so a UA was sent which was (+) LE and nitrites and antibiotics were started.  Pt was sent to CT scan for repeat imaging.  Given tachycardia and new fevers, SICU consult was obtained.      In the CT area, pt was awake , alert and oriented, conversant and c/o continued suprapubic pain. He denies any lightheadedness, shortness of breath or chest pain or pressure.      PAST MEDICAL HISTORY: No pertinent past medical history      PAST SURGICAL HISTORY: No significant past surgical history        CODE STATUS:  Full    HOME MEDICATIONS:    ALLERGIES: No Known Allergies      VITAL SIGNS:  ICU Vital Signs Last 24 Hrs  T(C): 39.3 (03 Apr 2019 14:18), Max: 39.3 (03 Apr 2019 14:18)  T(F): 102.7 (03 Apr 2019 14:18), Max: 102.7 (03 Apr 2019 14:18)  HR: 127 (03 Apr 2019 14:18) (87 - 127)  BP: 141/68 (03 Apr 2019 14:18) (127/61 - 170/84)  BP(mean): --  ABP: --  ABP(mean): --  RR: 17 (03 Apr 2019 14:18) (17 - 18)  SpO2: 91% (03 Apr 2019 14:18) (91% - 96%)      NEURO  Exam: AAOx4, CELAYA's equally  Meds: morphine  - Injectable 4 milliGRAM(s) IV Push once  ondansetron Injectable 4 milliGRAM(s) IV Push once      RESPIRATORY  Mechanical Ventilation:   Exam: LS CTA  Meds:    CARDIOVASCULAR    Exam: S1S2, tachy, (-)m/r/g  Cardiac Rhythm:  Meds:    GI/NUTRITION  Exam: soft, non-tender, non-distended, BSx4; (+) suprapubic tenderness  Diet: NPO  Meds:pantoprazole  Injectable 40 milliGRAM(s) IV Push daily      GENITOURINARY/RENAL  Meds:lactated ringers. 1000 milliLiter(s) IV Continuous <Continuous>      04-02 @ 07:01  -  04-03 @ 07:00  --------------------------------------------------------  IN:    lactated ringers.: 1200 mL  Total IN: 1200 mL    OUT:    Nasoenteral Tube: 1400 mL    Voided: 2250 mL  Total OUT: 3650 mL    Total NET: -2450 mL      04-03 @ 07:01  -  04-03 @ 14:20  --------------------------------------------------------  IN:    Lactated Ringers IV Bolus: 500 mL    lactated ringers.: 500 mL  Total IN: 1000 mL    OUT:    Nasoenteral Tube: 400 mL    Voided: 425 mL  Total OUT: 825 mL    Total NET: 175 mL          04-03    138  |  98  |  14  ----------------------------<  104<H>  3.5   |  25  |  0.90    Ca    8.7      03 Apr 2019 11:11  Phos  2.2     04-03  Mg     1.9     04-03      [ ] Coronado catheter, indication: urine output monitoring in critically ill patient    HEMATOLOGIC  [X] VTE Prophylaxis:  enoxaparin Injectable 40 milliGRAM(s) SubCutaneous daily                          9.6    15.1  )-----------( 320      ( 03 Apr 2019 11:11 )             32.2     PT/INR - ( 03 Apr 2019 06:38 )   PT: 12.7 sec;   INR: 1.10 ratio         PTT - ( 03 Apr 2019 06:38 )  PTT:31.8 sec  Transfusion: [ ] PRBC	[ ] Platelets	[ ] FFP	[ ] Cryoprecipitate      INFECTIOUS DISEASES  Meds:piperacillin/tazobactam IVPB. 3.375 Gram(s) IV Intermittent every 8 hours    RECENT CULTURES:  Specimen Source: .Urine Clean Catch (Midstream)  Date/Time: 04-01 @ 13:46  Culture Results:   <10,000 CFU/mL Normal Urogenital Amalia  Gram Stain: --  Organism: --      ENDOCRINE  Meds:  CAPILLARY BLOOD GLUCOSE          PATIENT CARE ACCESS DEVICES:  [ ] Peripheral IV  [ ] Central Venous Line	[ ] R	[ ] L	[ ] IJ	[ ] Fem	[ ] SC	Placed:   [ ] Arterial Line		[ ] R	[ ] L	[ ] Fem	[ ] Rad	[ ] Ax	Placed:   [ ] PICC:					[ ] Mediport  [ ] Urinary Catheter, Date Placed:   [x] Necessity of urinary, arterial, and venous catheters discussed    OTHER MEDICATIONS:     IMAGING STUDIES:  < from: CT Abdomen and Pelvis w/ Oral Cont and w/ IV Cont (04.01.19 @ 08:44) >  *** ADDENDUM 04/01/2019  ***    Correction of transcriptional error:    *  Small bowel obstruction secondary to 5 cm cecal mass at the level of   the  ileocecal valve.      *** END OF ADDENDUM 04/01/2019  ***      PROCEDURE DATE:  04/01/2019            INTERPRETATION:  CLINICAL INFORMATION: Abdominal pain. Upper abdominal   pain.         COMPARISON: None.    PROCEDURE:   CT of the Abdomen and Pelvis was performed with intravenous contrast.   Intravenous contrast: 90 ml Omnipaque 350. 10 ml discarded.  Oral contrast: positive contrast was administered.  Sagittal and coronal reformats were performed.    FINDINGS:    LOWER CHEST: Within normal limits.    LIVER: Within normal limits.  BILE DUCTS: Normal caliber.  GALLBLADDER: Within normal limits.  SPLEEN: Within normal limits.  PANCREAS: Within normal limits.  ADRENALS: Within normal limits.  KIDNEYS/URETERS: Left renal hypodensity too small to characterize, likely   a cyst. Otherwise within normal limits.    BLADDER: Within normal limits.  REPRODUCTIVE ORGANS: Enlarged prostate.    BOWEL: There is dilatation of distal small bowel loops with fecaliization   of the distal ileal contents to the level of the cecum where 5 cm mass is   present at the level of the ileocecal valve. There is increase in size   and number of mesenteric lymph nodes in the right lower quadrant the   largest measuring 1.1 cm (3:62) and 0.9 cm (3:58). Appendix is normal.   Diverticulosis.  PERITONEUM: Trace pelvic ascites. No pneumoperitoneum.  VESSELS:  Atherosclerotic calcifications.  RETROPERITONEUM: No lymphadenopathy.    ABDOMINAL WALL: Within normal limits.  BONES: Within normal limits.    IMPRESSION:     *  Small bowel obstruction secondary to 5 cm stable mass at the level of   the ileocecal valve.  *  Right lower quadrant is apparent adenopathy. No evidence of metastatic   disease in the abdomen and pelvis.  *  Trace pelvic ascites.          < from: CT Abdomen and Pelvis w/ Oral Cont (04.03.19 @ 12:38) >  FINDINGS:    LOWER CHEST: Within normal limits.    Evaluation of the solid organs is limited without the administration of   intravenous contrast.    LIVER: Within normal limits.  BILE DUCTS: Normal caliber.  GALLBLADDER: Within normal limits.  SPLEEN: Within normal limits.  PANCREAS: Within normal limits.  ADRENALS: Within normal limits.  KIDNEYS/URETERS: No hydronephrosis.    BLADDER: Bladder wall thickening with mild surrounding inflammatory   changes.  REPRODUCTIVE ORGANS: Prostate is enlarged.    BOWEL: Enteric catheter with tip in the stomach. Decreased dilatation of   small bowel loops proximal to the cecal mass compared to the prior exam   4/1/2019. Oral contrast is notedwithin the colon, likely from the prior   exam. Long segment of wall thickening involving the terminal ileum and   distal ileum. Normal appendix.  PERITONEUM: Trace pelvic free fluid is decreased compared to the prior   exam. Redemonstration of rightlower quadrant mesenteric nodes measuring   up to 1.1 cm (series 3 image 83).  VESSELS:  Atherosclerotic disease of the abdominal aorta.  RETROPERITONEUM: No lymphadenopathy.    ABDOMINAL WALL: Within normal limits.  BONES: Degenerative changes of the spine.    IMPRESSION:     Resolving small bowel obstruction with redemonstration of cecal mass.    Redemonstration of long segment enteritis involving the distal and   terminal ileum, etiology is unclear.  Decreased pelvic free fluid.     Wall thickening with minimal surrounding inflammatory changes involving   the urinary bladder; correlate with urinalysis.

## 2019-04-03 NOTE — PROGRESS NOTE ADULT - SUBJECTIVE AND OBJECTIVE BOX
GENERAL SURGERY DAILY PROGRESS NOTE:       Subjective:  Pt seen and examined at bedside. No acute events overnight. Pain controlled. (-)N/V, (-) flatus/BM.         Objective:    PE:  Gen: resting comfortably in bed, NAD  Resp: breathing comfortably  Abd: soft, generalized tenderness but worst in epigastrium, distended    Vital Signs (24 Hrs):  T(C): 36.6 (04-03-19 @ 01:41), Max: 37.2 (04-02-19 @ 09:35)  HR: 97 (04-03-19 @ 01:41) (77 - 97)  BP: 150/75 (04-03-19 @ 01:41) (133/71 - 150/75)  RR: 17 (04-03-19 @ 01:41) (17 - 18)  SpO2: 95% (04-03-19 @ 01:41) (93% - 95%)  Wt(kg): --  Daily     Daily     I&O's Summary    01 Apr 2019 07:01  -  02 Apr 2019 07:00  --------------------------------------------------------  IN: 1500 mL / OUT: 1950 mL / NET: -450 mL    02 Apr 2019 07:01  -  03 Apr 2019 05:28  --------------------------------------------------------  IN: 1200 mL / OUT: 1800 mL / NET: -600 mL        MEDICATIONS  (PRN):      LABS:                           9.2    8.9   )-----------( 296      ( 02 Apr 2019 06:42 )             27.6       04-02    138  |  101  |  11  ----------------------------<  96  3.7   |  26  |  0.92    Ca    8.6      02 Apr 2019 06:42  Phos  2.2     04-02  Mg     2.0     04-02    TPro  7.5  /  Alb  4.5  /  TBili  0.4  /  DBili  x   /  AST  12  /  ALT  10  /  AlkPhos  97  04-01      PT/INR - ( 02 Apr 2019 11:02 )   PT: 11.8 sec;   INR: 1.03 ratio         PTT - ( 02 Apr 2019 11:02 )  PTT:29.4 sec              Urinalysis Basic - ( 01 Apr 2019 18:55 )    Color: Light Yellow / Appearance: Clear / SG: >1.050 / pH: x  Gluc: x / Ketone: Negative  / Bili: Negative / Urobili: Negative   Blood: x / Protein: Trace / Nitrite: Negative   Leuk Esterase: Negative / RBC: 1 /hpf / WBC 1 /HPF   Sq Epi: x / Non Sq Epi: 1 /hpf / Bacteria: Negative GENERAL SURGERY DAILY PROGRESS NOTE:       Subjective:  Pt seen and examined at bedside. No acute events overnight. Pain controlled. (-)N/V, (-) flatus/BM.         Objective:    PE:  Gen: resting comfortably in bed, NAD  Resp: breathing comfortably  Abd: soft, non-tender, distended    Vital Signs (24 Hrs):  T(C): 36.6 (04-03-19 @ 01:41), Max: 37.2 (04-02-19 @ 09:35)  HR: 97 (04-03-19 @ 01:41) (77 - 97)  BP: 150/75 (04-03-19 @ 01:41) (133/71 - 150/75)  RR: 17 (04-03-19 @ 01:41) (17 - 18)  SpO2: 95% (04-03-19 @ 01:41) (93% - 95%)  Wt(kg): --  Daily     Daily     I&O's Summary    01 Apr 2019 07:01  -  02 Apr 2019 07:00  --------------------------------------------------------  IN: 1500 mL / OUT: 1950 mL / NET: -450 mL    02 Apr 2019 07:01  -  03 Apr 2019 05:28  --------------------------------------------------------  IN: 1200 mL / OUT: 1800 mL / NET: -600 mL        MEDICATIONS  (PRN):      LABS:                           9.2    8.9   )-----------( 296      ( 02 Apr 2019 06:42 )             27.6       04-02    138  |  101  |  11  ----------------------------<  96  3.7   |  26  |  0.92    Ca    8.6      02 Apr 2019 06:42  Phos  2.2     04-02  Mg     2.0     04-02    TPro  7.5  /  Alb  4.5  /  TBili  0.4  /  DBili  x   /  AST  12  /  ALT  10  /  AlkPhos  97  04-01      PT/INR - ( 02 Apr 2019 11:02 )   PT: 11.8 sec;   INR: 1.03 ratio         PTT - ( 02 Apr 2019 11:02 )  PTT:29.4 sec              Urinalysis Basic - ( 01 Apr 2019 18:55 )    Color: Light Yellow / Appearance: Clear / SG: >1.050 / pH: x  Gluc: x / Ketone: Negative  / Bili: Negative / Urobili: Negative   Blood: x / Protein: Trace / Nitrite: Negative   Leuk Esterase: Negative / RBC: 1 /hpf / WBC 1 /HPF   Sq Epi: x / Non Sq Epi: 1 /hpf / Bacteria: Negative

## 2019-04-04 LAB
ALBUMIN SERPL ELPH-MCNC: 2.8 G/DL — LOW (ref 3.3–5)
ALP SERPL-CCNC: 69 U/L — SIGNIFICANT CHANGE UP (ref 40–120)
ALT FLD-CCNC: 19 U/L — SIGNIFICANT CHANGE UP (ref 10–45)
ANION GAP SERPL CALC-SCNC: 12 MMOL/L — SIGNIFICANT CHANGE UP (ref 5–17)
AST SERPL-CCNC: 27 U/L — SIGNIFICANT CHANGE UP (ref 10–40)
BILIRUB SERPL-MCNC: 0.7 MG/DL — SIGNIFICANT CHANGE UP (ref 0.2–1.2)
BUN SERPL-MCNC: 12 MG/DL — SIGNIFICANT CHANGE UP (ref 7–23)
CALCIUM SERPL-MCNC: 7.8 MG/DL — LOW (ref 8.4–10.5)
CHLORIDE SERPL-SCNC: 104 MMOL/L — SIGNIFICANT CHANGE UP (ref 96–108)
CO2 SERPL-SCNC: 23 MMOL/L — SIGNIFICANT CHANGE UP (ref 22–31)
CREAT SERPL-MCNC: 1.03 MG/DL — SIGNIFICANT CHANGE UP (ref 0.5–1.3)
E COLI DNA BLD POS QL NAA+NON-PROBE: SIGNIFICANT CHANGE UP
GLUCOSE SERPL-MCNC: 94 MG/DL — SIGNIFICANT CHANGE UP (ref 70–99)
GRAM STN FLD: SIGNIFICANT CHANGE UP
HCT VFR BLD CALC: 27.5 % — LOW (ref 39–50)
HGB BLD-MCNC: 8.1 G/DL — LOW (ref 13–17)
MAGNESIUM SERPL-MCNC: 2.2 MG/DL — SIGNIFICANT CHANGE UP (ref 1.6–2.6)
MCHC RBC-ENTMCNC: 19.2 PG — LOW (ref 27–34)
MCHC RBC-ENTMCNC: 29.5 GM/DL — LOW (ref 32–36)
MCV RBC AUTO: 65.3 FL — LOW (ref 80–100)
METHOD TYPE: SIGNIFICANT CHANGE UP
PHOSPHATE SERPL-MCNC: 4.7 MG/DL — HIGH (ref 2.5–4.5)
PLATELET # BLD AUTO: 236 K/UL — SIGNIFICANT CHANGE UP (ref 150–400)
POTASSIUM SERPL-MCNC: 4.2 MMOL/L — SIGNIFICANT CHANGE UP (ref 3.5–5.3)
POTASSIUM SERPL-SCNC: 4.2 MMOL/L — SIGNIFICANT CHANGE UP (ref 3.5–5.3)
PROT SERPL-MCNC: 5.2 G/DL — LOW (ref 6–8.3)
RBC # BLD: 4.21 M/UL — SIGNIFICANT CHANGE UP (ref 4.2–5.8)
RBC # FLD: 16.6 % — HIGH (ref 10.3–14.5)
SODIUM SERPL-SCNC: 139 MMOL/L — SIGNIFICANT CHANGE UP (ref 135–145)
SPECIMEN SOURCE: SIGNIFICANT CHANGE UP
WBC # BLD: 18.1 K/UL — HIGH (ref 3.8–10.5)
WBC # FLD AUTO: 18.1 K/UL — HIGH (ref 3.8–10.5)

## 2019-04-04 RX ORDER — ACETAMINOPHEN 500 MG
1000 TABLET ORAL ONCE
Qty: 0 | Refills: 0 | Status: COMPLETED | OUTPATIENT
Start: 2019-04-04 | End: 2019-04-04

## 2019-04-04 RX ORDER — SODIUM CHLORIDE 9 MG/ML
500 INJECTION, SOLUTION INTRAVENOUS ONCE
Qty: 0 | Refills: 0 | Status: COMPLETED | OUTPATIENT
Start: 2019-04-04 | End: 2019-04-04

## 2019-04-04 RX ORDER — POTASSIUM PHOSPHATE, MONOBASIC POTASSIUM PHOSPHATE, DIBASIC 236; 224 MG/ML; MG/ML
30 INJECTION, SOLUTION INTRAVENOUS ONCE
Qty: 0 | Refills: 0 | Status: COMPLETED | OUTPATIENT
Start: 2019-04-04 | End: 2019-04-04

## 2019-04-04 RX ORDER — POTASSIUM CHLORIDE 20 MEQ
10 PACKET (EA) ORAL
Qty: 0 | Refills: 0 | Status: COMPLETED | OUTPATIENT
Start: 2019-04-04 | End: 2019-04-04

## 2019-04-04 RX ADMIN — PIPERACILLIN AND TAZOBACTAM 25 GRAM(S): 4; .5 INJECTION, POWDER, LYOPHILIZED, FOR SOLUTION INTRAVENOUS at 21:49

## 2019-04-04 RX ADMIN — SODIUM CHLORIDE 2000 MILLILITER(S): 9 INJECTION, SOLUTION INTRAVENOUS at 02:47

## 2019-04-04 RX ADMIN — Medication 100 MILLIGRAM(S): at 14:54

## 2019-04-04 RX ADMIN — PIPERACILLIN AND TAZOBACTAM 25 GRAM(S): 4; .5 INJECTION, POWDER, LYOPHILIZED, FOR SOLUTION INTRAVENOUS at 06:14

## 2019-04-04 RX ADMIN — PANTOPRAZOLE SODIUM 40 MILLIGRAM(S): 20 TABLET, DELAYED RELEASE ORAL at 14:54

## 2019-04-04 RX ADMIN — Medication 400 MILLIGRAM(S): at 18:18

## 2019-04-04 RX ADMIN — PIPERACILLIN AND TAZOBACTAM 25 GRAM(S): 4; .5 INJECTION, POWDER, LYOPHILIZED, FOR SOLUTION INTRAVENOUS at 14:54

## 2019-04-04 RX ADMIN — ENOXAPARIN SODIUM 40 MILLIGRAM(S): 100 INJECTION SUBCUTANEOUS at 14:54

## 2019-04-04 RX ADMIN — Medication 100 MILLIEQUIVALENT(S): at 02:47

## 2019-04-04 RX ADMIN — Medication 100 MILLIEQUIVALENT(S): at 01:04

## 2019-04-04 RX ADMIN — Medication 100 MILLIGRAM(S): at 06:15

## 2019-04-04 RX ADMIN — Medication 100 MILLIEQUIVALENT(S): at 04:37

## 2019-04-04 RX ADMIN — POTASSIUM PHOSPHATE, MONOBASIC POTASSIUM PHOSPHATE, DIBASIC 83.33 MILLIMOLE(S): 236; 224 INJECTION, SOLUTION INTRAVENOUS at 01:21

## 2019-04-04 NOTE — PROVIDER CONTACT NOTE (OTHER) - BACKGROUND
Pt admitted w/ abdominal pain x12 hours worse in epigastrium. CT scan showed 5 Pt admitted w/ abdominal pain x12 hours worse in epigastrium. CT scan showed SBO associated w/ 5cm cecal mass

## 2019-04-04 NOTE — CONSULT NOTE ADULT - SUBJECTIVE AND OBJECTIVE BOX
Patient is a 73y old  Male who presents with a chief complaint of     INTERVAL HPI/OVERNIGHT EVENTS:    Medications:MEDICATIONS  (STANDING):  enoxaparin Injectable 40 milliGRAM(s) SubCutaneous daily  lactated ringers. 1000 milliLiter(s) (100 mL/Hr) IV Continuous <Continuous>  morphine  - Injectable 4 milliGRAM(s) IV Push once  ondansetron Injectable 4 milliGRAM(s) IV Push once  pantoprazole  Injectable 40 milliGRAM(s) IV Push daily  phenazopyridine 100 milliGRAM(s) Oral every 8 hours  piperacillin/tazobactam IVPB. 3.375 Gram(s) IV Intermittent every 8 hours    MEDICATIONS  (PRN):      Allergies: Allergies    No Known Allergies    Intolerances          FAMILY HISTORY:        PAST MEDICAL & SURGICAL HISTORY:  No pertinent past medical history  No significant past surgical history      REVIEW OF SYSTEMS:  CONSTITUTIONAL: No fever, weight loss, or fatigue  EYES: No eye pain, visual disturbances, or discharge  ENMT:  No difficulty hearing, tinnitus, vertigo; No sinus or throat pain  NECK: No pain or stiffness  BREASTS: No pain, masses, or nipple discharge  RESPIRATORY: No cough, wheezing, chills or hemoptysis; No shortness of breath  CARDIOVASCULAR: No chest pain, palpitations, dizziness, or leg swelling  GASTROINTESTINAL: No abdominal or epigastric pain. No nausea, vomiting, or hematemesis; No diarrhea or constipation. No melena or hematochezia.  GENITOURINARY: No dysuria, frequency, hematuria, or incontinence  NEUROLOGICAL: No headaches, memory loss, loss of strength, numbness, or tremors  SKIN: No itching, burning, rashes, or lesions   LYMPH NODES: No enlarged glands  ENDOCRINE: No heat or cold intolerance; No hair loss  MUSCULOSKELETAL: No joint pain or swelling; No muscle, back, or extremity pain  PSYCHIATRIC: No depression, anxiety, mood swings, or difficulty sleeping  HEME/LYMPH: No easy bruising, or bleeding gums  ALLERY AND IMMUNOLOGIC: No hives or eczema    T(C): 36.3 (19 @ 05:41), Max: 39.3 (19 @ 14:18)  HR: 95 (19 @ 05:41) (95 - 127)  BP: 104/66 (19 @ 05:41) (104/66 - 150/77)  RR: 18 (19 @ 05:41) (17 - 18)  SpO2: 94% (19 @ 05:41) (91% - 94%)  Wt(kg): --Vital Signs Last 24 Hrs  T(C): 36.3 (2019 05:41), Max: 39.3 (2019 14:18)  T(F): 97.3 (2019 05:41), Max: 102.7 (2019 14:18)  HR: 95 (2019 05:41) (95 - 127)  BP: 104/66 (2019 05:41) (104/66 - 150/77)  BP(mean): --  RR: 18 (2019 05:41) (17 - 18)  SpO2: 94% (2019 05:41) (91% - 94%)  I&O's Summary    2019 07:  -  2019 07:00  --------------------------------------------------------  IN: 5350 mL / OUT: 2325 mL / NET: 3025 mL    2019 07:  -  2019 10:36  --------------------------------------------------------  IN: 0 mL / OUT: 25 mL / NET: -25 mL        PHYSICAL EXAM:  GENERAL: NAD, well-groomed, well-developed  HEAD:  Atraumatic, Normocephalic  EYES: EOMI, PERRLA, conjunctiva and sclera clear  ENMT: No tonsillar erythema, exudates, or enlargement; Moist mucous membranes, Good dentition, No lesions  NECK: Supple, No JVD, Normal thyroid  NERVOUS SYSTEM:  Alert & Oriented X3, Good concentration; Motor Strength 5/5 B/L upper and lower extremities; DTRs 2+ intact and symmetric  CHEST/LUNG: Clear to percussion bilaterally; No rales, rhonchi, wheezing, or rubs  HEART: Regular rate and rhythm; No murmurs, rubs, or gallops  ABDOMEN: Soft, Nontender, Nondistended; Bowel sounds present  EXTREMITIES:  2+ Peripheral Pulses, No clubbing, cyanosis, or edema  LYMPH: No lymphadenopathy noted  SKIN: No rashes or lesions    Consultant(s) Notes Reviewed:  [x ] YES  [ ] NO  Care Discussed with Consultants/Other Providerscpk [ x] YES  [ ] NO    LABS:                    CBC Full  -  ( 2019 07:30 )  WBC Count : 18.1 K/uL  RBC Count : 4.21 M/uL  Hemoglobin : 8.1 g/dL  Hematocrit : 27.5 %  Platelet Count - Automated : 236 K/uL  Mean Cell Volume : 65.3 fl  Mean Cell Hemoglobin : 19.2 pg  Mean Cell Hemoglobin Concentration : 29.5 gm/dL  Auto Neutrophil # : x  Auto Lymphocyte # : x  Auto Monocyte # : x  Auto Eosinophil # : x  Auto Basophil # : x  Auto Neutrophil % : x  Auto Lymphocyte % : x  Auto Monocyte % : x  Auto Eosinophil % : x  Auto Basophil % : x      04-04    139  |  104  |  12  ----------------------------<  94  4.2   |  23  |  1.03    Ca    7.8<L>      2019 07:30  Phos  4.7     04-04  Mg     2.2     04-04    TPro  5.2<L>  /  Alb  2.8<L>  /  TBili  0.7  /  DBili  x   /  AST  27  /  ALT  19  /  AlkPhos  69  04-04      Urinalysis Basic - ( 2019 09:01 )    Color: Yellow / Appearance: Slightly Turbid / S.023 / pH: x  Gluc: x / Ketone: Large  / Bili: Negative / Urobili: Negative   Blood: x / Protein: 30 mg/dL / Nitrite: Positive   Leuk Esterase: Large / RBC: 6 /hpf /  /HPF   Sq Epi: x / Non Sq Epi: 1 /hpf / Bacteria: Many        PT/INR - ( 2019 06:38 )   PT: 12.7 sec;   INR: 1.10 ratio         PTT - ( 2019 06:38 )  PTT:31.8 sec  RADIOLOGY & ADDITIONAL TESTS:    Imaging Personally Reviewed:  [ ] YES  [ ] NO hpi reviewed    INTERVAL HPI/OVERNIGHT EVENTS:    Medications:MEDICATIONS  (STANDING):  enoxaparin Injectable 40 milliGRAM(s) SubCutaneous daily  lactated ringers. 1000 milliLiter(s) (100 mL/Hr) IV Continuous <Continuous>  morphine  - Injectable 4 milliGRAM(s) IV Push once  ondansetron Injectable 4 milliGRAM(s) IV Push once  pantoprazole  Injectable 40 milliGRAM(s) IV Push daily  phenazopyridine 100 milliGRAM(s) Oral every 8 hours  piperacillin/tazobactam IVPB. 3.375 Gram(s) IV Intermittent every 8 hours    MEDICATIONS  (PRN):      Allergies: Allergies    No Known Allergies    Intolerances          FAMILY HISTORY:        PAST MEDICAL & SURGICAL HISTORY:  No pertinent past medical history  No significant past surgical history      REVIEW OF SYSTEMS:  CONSTITUTIONAL: weak /fever /   EYES: No eye pain, visual disturbances, or discharge  ENMT:  No difficulty hearing, tinnitus, vertigo; No sinus or throat pain  NECK: No pain or stiffness  RESPIRATORY: No cough, wheezing, chills or hemoptysis; No shortness of breath  CARDIOVASCULAR: No chest pain, palpitations, dizziness, or leg swelling  GASTROINTESTINAL: less abd pain   GENITOURINARY: No dysuria, frequency, hematuria, or incontinence  NEUROLOGICAL: No headaches, memory loss, loss of strength, numbness, or tremors      T(C): 36.3 (19 @ 05:41), Max: 39.3 (19 @ 14:18)  HR: 95 (19 @ 05:41) (95 - 127)  BP: 104/66 (19 @ 05:41) (104 - 150/77)  RR: 18 (19 @ 05:41) (17 - 18)  SpO2: 94% (19 @ 05:41) (91% - 94%)  Wt(kg): --Vital Signs Last 24 Hrs  T(C): 36.3 (2019 05:41), Max: 39.3 (2019 14:18)  T(F): 97.3 (2019 05:41), Max: 102.7 (2019 14:18)  HR: 95 (2019 05:41) (95 - 127)  BP: 104/66 (2019 05:41) (104/66 - 150/77)  BP(mean): --  RR: 18 (2019 05:41) (17 - 18)  SpO2: 94% (2019 05:41) (91% - 94%)  I&O's Summary    2019 07:01  -  2019 07:00  --------------------------------------------------------  IN: 5350 mL / OUT: 2325 mL / NET: 3025 mL    2019 07:01  -  2019 10:36  --------------------------------------------------------  IN: 0 mL / OUT: 25 mL / NET: -25 mL        PHYSICAL EXAM:  GENERAL: NAD, well-groomed, well-developed  HEAD:  Atraumatic, Normocephalic  EYES: EOMI, PERRLA, conjunctiva and sclera clear  ENMT: No tonsillar erythema, exudates, or enlargement; Moist mucous membranes,  No lesions  NECK: Supple, No JVD, Normal thyroid  NERVOUS SYSTEM:  Alert & Oriented X3, ; Motor Strength 5/5 B/L upper and lower extremities; DTRs 2+ intact and symmetric  CHEST/LUNG: Clear to percussion bilaterally; No rales, rhonchi, wheezing, or rubs  HEART: Regular rate and rhythm; No murmurs, rubs, or gallops  ABDOMEN: distended no r/g  EXTREMITIES:  2+ Peripheral Pulses, No clubbing, cyanosis, or edema  SKIN: No rashes or lesions    Consultant(s) Notes Reviewed:  [x ] YES  [ ] NO  Care Discussed with Consultants/Other Providerscpk [ x] YES  [ ] NO    LABS:                    CBC Full  -  ( 2019 07:30 )  WBC Count : 18.1 K/uL  RBC Count : 4.21 M/uL  Hemoglobin : 8.1 g/dL  Hematocrit : 27.5 %  Platelet Count - Automated : 236 K/uL  Mean Cell Volume : 65.3 fl  Mean Cell Hemoglobin : 19.2 pg  Mean Cell Hemoglobin Concentration : 29.5 gm/dL  Auto Neutrophil # : x  Auto Lymphocyte # : x  Auto Monocyte # : x  Auto Eosinophil # : x  Auto Basophil # : x  Auto Neutrophil % : x  Auto Lymphocyte % : x  Auto Monocyte % : x  Auto Eosinophil % : x  Auto Basophil % : x      04-    139  |  104  |  12  ----------------------------<  94  4.2   |  23  |  1.03    Ca    7.8<L>      2019 07:30  Phos  4.7     -  Mg     2.2     -    TPro  5.2<L>  /  Alb  2.8<L>  /  TBili  0.7  /  DBili  x   /  AST  27  /  ALT  19  /  AlkPhos  69  -      Urinalysis Basic - ( 2019 09:01 )    Color: Yellow / Appearance: Slightly Turbid / S.023 / pH: x  Gluc: x / Ketone: Large  / Bili: Negative / Urobili: Negative   Blood: x / Protein: 30 mg/dL / Nitrite: Positive   Leuk Esterase: Large / RBC: 6 /hpf /  /HPF   Sq Epi: x / Non Sq Epi: 1 /hpf / Bacteria: Many        PT/INR - ( 2019 06:38 )   PT: 12.7 sec;   INR: 1.10 ratio         PTT - ( 2019 06:38 )  PTT:31.8 sec  RADIOLOGY & ADDITIONAL TESTS:    Imaging Personally Reviewed:  [ ] YES  [ ] NO

## 2019-04-04 NOTE — CHART NOTE - NSCHARTNOTEFT_GEN_A_CORE
Was notified by pt's nurse that pt was tachy to 120s and febrile at 102.7F about 10.30pm lastnight.  Went to see and examine pt.    Subjective:  Denied cp, sob, abd pain, n/v, palpitations, headaches.    Objective:  T 102.7    /77  RR 18  O2 sat 93%    uop was 675cc    PE:  Gen: NAD, lying in bed  Resp: nonlabored  Abd: soft, NTND    A/P:  obtained stat labs: CBC, BMP Mg/Phos, Lactate (results was remarkable for leukocytosis- 18.9 from 15.1 earlier in the day; H/H drop from 9.6/32/2 to 8.2/27.7; K of 3.4/Phos of 2.1; lactate of 1.5); electrolytes were repleted appropriately.  1L bolus was given. another 500cc bolus given after a couple of hours  continued to monitor uop (uop now 925cc)  continued to monitor (saw pt twice after initial encounter tonight; still nontoxic, abd soft, NTND)  -will continue zosyn  -pain control as needed  -f/u fever workup from yesterday

## 2019-04-04 NOTE — PROGRESS NOTE ADULT - ATTENDING COMMENTS
I saw and examined the pt and discussed the tx plan with the House Staff. I agree with the exam and plan as documented in the surgery resident's note from today.  Surgery cancelled yesterday d/t UTI causing picture of sepsis.  Discussed with daughter at the bedside.  Burning on urination resolved. + flatus, BMs.   No abd pain.  Last episode of fever and tachycardia last night.  Abdomen soft, NT, ND.  CT with resolution of SBO, the fecalization of loops of distal SB has resolved and SB mush less distended - however the distal SB is thickened (d/t the recent obstruction).  Will need at least a few days to recover from the UTI.  Await Cx results.  Monitor NGT this morning, depending on amount we may be able to do clamping trial this afternoon, if he tolerates would d/c, give him Miralax and liquid diet and plan for surgery (and possible colonoscopy) next week.  If he continues to require NGT, we will plan for surgery in the next few days.  Sarah Painter MD I saw and examined the pt and discussed the tx plan with the House Staff. I agree with the exam and plan as documented in the surgery resident's note from today.  Surgery cancelled yesterday d/t UTI causing picture of sepsis.  Discussed with daughter at the bedside.  Burning on urination resolved. + flatus, BMs.   No abd pain.  Last episode of fever and tachycardia last night.  Abdomen soft, NT, ND.  CT with resolution of SBO, the fecalization of loops of distal SB has resolved and SB mush less distended - however the distal SB is thickened (d/t the recent obstruction).  Will need at least a few days to recover from the UTI.  Await Cx results.  Monitor NGT this morning, depending on amount we may be able to do clamping trial this afternoon, if he tolerates would d/c, give him Miralax and liquid diet and plan for surgery (and possible colonoscopy) next week.  If he continues to require NGT, we will plan for surgery in the next few days.  Medicine and Urology consults.   Sarah Painter MD

## 2019-04-04 NOTE — PROGRESS NOTE ADULT - SUBJECTIVE AND OBJECTIVE BOX
[facility] Progress Note    Interval: Patient was tachy to 120s and febrile to 102.7F at about 10.30pm lastnight. CBC showing further leukocytosis. Condom catheter placed. Given 1.5 L in IVF bolus total. Has put out 500 of urine since then.    SUBJECTIVE: Patient seen and examined at the bedside. Feeling well this morning. Tolerating clear liquids without nausea or vomiting. Pain is well controlled. Has passed flatus and a bowel movement. Ambulating well.     VITALS  T(C): 36.4 (04-04-19 @ 01:23), Max: 39.3 (04-03-19 @ 14:18)  HR: 104 (04-04-19 @ 01:23) (104 - 127)  BP: 117/67 (04-04-19 @ 01:23) (117/67 - 170/84)  RR: 18 (04-04-19 @ 01:23) (17 - 18)  SpO2: 91% (04-04-19 @ 01:23) (91% - 96%)      Is/Os    04-02 @ 07:01  -  04-03 @ 07:00  --------------------------------------------------------  IN:    lactated ringers.: 1200 mL  Total IN: 1200 mL    OUT:    Nasoenteral Tube: 1400 mL    Voided: 2250 mL  Total OUT: 3650 mL    Total NET: -2450 mL      04-03 @ 07:01  -  04-04 @ 04:49  --------------------------------------------------------  IN:    Lactated Ringers IV Bolus: 1500 mL    lactated ringers.: 1700 mL    Solution: 50 mL    Solution: 100 mL    Solution: 1000 mL    Solution: 200 mL  Total IN: 4550 mL    OUT:    Nasoenteral Tube: 700 mL    Voided: 925 mL  Total OUT: 1625 mL    Total NET: 2925 mL    PHYSICAL EXAM:   General: NAD, Lying in bed comfortably, alert, oriented x3 NGT in place with bilious otuput  Pulm: Non-labored breathing on RA  GI/Abd: Soft, NT/ND, no rebound/guarding    MEDICATIONS (STANDING): enoxaparin Injectable 40 milliGRAM(s) SubCutaneous daily  lactated ringers. 1000 milliLiter(s) IV Continuous <Continuous>  morphine  - Injectable 4 milliGRAM(s) IV Push once  ondansetron Injectable 4 milliGRAM(s) IV Push once  pantoprazole  Injectable 40 milliGRAM(s) IV Push daily  phenazopyridine 100 milliGRAM(s) Oral every 8 hours  piperacillin/tazobactam IVPB. 3.375 Gram(s) IV Intermittent every 8 hours    MEDICATIONS (PRN):  LABS  CBC (04-03 @ 22:57)                              8.2<L>                         18.9<H>  )----------------(  267        --    % Neutrophils, --    % Lymphocytes, ANC: --                                  27.7<L>  CBC (04-03 @ 11:11)                              9.6<L>                         15.1<H>  )----------------(  320        --    % Neutrophils, --    % Lymphocytes, ANC: --                                  32.2<L>    BMP (04-03 @ 22:57)             136     |  101     |  13    		Ca++ --      Ca 8.0<L>             ---------------------------------( 111<H>		Mg 2.2                3.4<L>  |  23      |  0.91  			Ph 2.1<L>  BMP (04-03 @ 11:11)             138     |  98      |  14    		Ca++ --      Ca 8.7                ---------------------------------( 104<H>		Mg 1.9                3.5     |  25      |  0.90  			Ph 2.2<L>      Coags (04-03 @ 06:38)  aPTT 31.8 / INR 1.10 / PT 12.7      ABG (04-03 @ 22:57)      /  /  /  /  / %     Lactate:  1.5      IMAGING STUDIES [facility] Progress Note    Interval: Patient was tachy to 120s and febrile to 102.7F at about 10.30pm lastnight. CBC showing further leukocytosis. Condom catheter placed. Given 1.5 L in IVF bolus total. Has put out 500 of urine since then.    SUBJECTIVE: Patient seen and examined at the bedside. Feeling well this morning. Tolerating clear liquids without nausea or vomiting. States that he experiences burning with urination and slow stream    VITALS  T(C): 36.4 (04-04-19 @ 01:23), Max: 39.3 (04-03-19 @ 14:18)  HR: 104 (04-04-19 @ 01:23) (104 - 127)  BP: 117/67 (04-04-19 @ 01:23) (117/67 - 170/84)  RR: 18 (04-04-19 @ 01:23) (17 - 18)  SpO2: 91% (04-04-19 @ 01:23) (91% - 96%)      Is/Os    04-02 @ 07:01  -  04-03 @ 07:00  --------------------------------------------------------  IN:    lactated ringers.: 1200 mL  Total IN: 1200 mL    OUT:    Nasoenteral Tube: 1400 mL    Voided: 2250 mL  Total OUT: 3650 mL    Total NET: -2450 mL      04-03 @ 07:01  -  04-04 @ 04:49  --------------------------------------------------------  IN:    Lactated Ringers IV Bolus: 1500 mL    lactated ringers.: 1700 mL    Solution: 50 mL    Solution: 100 mL    Solution: 1000 mL    Solution: 200 mL  Total IN: 4550 mL    OUT:    Nasoenteral Tube: 700 mL    Voided: 925 mL  Total OUT: 1625 mL    Total NET: 2925 mL    PHYSICAL EXAM:   General: NAD, Lying in bed comfortably, alert, oriented x3 NGT in place with bilious otuput  Pulm: Non-labored breathing on RA  GI/Abd: Soft, NT/ND, no rebound/guarding    MEDICATIONS (STANDING): enoxaparin Injectable 40 milliGRAM(s) SubCutaneous daily  lactated ringers. 1000 milliLiter(s) IV Continuous <Continuous>  morphine  - Injectable 4 milliGRAM(s) IV Push once  ondansetron Injectable 4 milliGRAM(s) IV Push once  pantoprazole  Injectable 40 milliGRAM(s) IV Push daily  phenazopyridine 100 milliGRAM(s) Oral every 8 hours  piperacillin/tazobactam IVPB. 3.375 Gram(s) IV Intermittent every 8 hours    MEDICATIONS (PRN):  LABS  CBC (04-03 @ 22:57)                              8.2<L>                         18.9<H>  )----------------(  267        --    % Neutrophils, --    % Lymphocytes, ANC: --                                  27.7<L>  CBC (04-03 @ 11:11)                              9.6<L>                         15.1<H>  )----------------(  320        --    % Neutrophils, --    % Lymphocytes, ANC: --                                  32.2<L>    BMP (04-03 @ 22:57)             136     |  101     |  13    		Ca++ --      Ca 8.0<L>             ---------------------------------( 111<H>		Mg 2.2                3.4<L>  |  23      |  0.91  			Ph 2.1<L>  BMP (04-03 @ 11:11)             138     |  98      |  14    		Ca++ --      Ca 8.7                ---------------------------------( 104<H>		Mg 1.9                3.5     |  25      |  0.90  			Ph 2.2<L>      Coags (04-03 @ 06:38)  aPTT 31.8 / INR 1.10 / PT 12.7      ABG (04-03 @ 22:57)      /  /  /  /  / %     Lactate:  1.5      IMAGING STUDIES

## 2019-04-04 NOTE — PROGRESS NOTE ADULT - ASSESSMENT
73M w/ no PMHx presents with acute abdominal pain concerning for small bowel obstruction secondary to newly imaged 5 cm cecal mass now with fevers and tachycardia 2/2 UTI    - NPO + IVF   - Surgery postponed  - Zosyn for UTI  - Strict Is and Os  - NGT  - No standing pain medication, requires surgical team evaluation prior to pain medication dosing   - No home medications  - Lovenox for DVT prophylaxis    Surgery Pager [phone]

## 2019-04-05 DIAGNOSIS — C18.2 MALIGNANT NEOPLASM OF ASCENDING COLON: ICD-10-CM

## 2019-04-05 DIAGNOSIS — D72.829 ELEVATED WHITE BLOOD CELL COUNT, UNSPECIFIED: ICD-10-CM

## 2019-04-05 DIAGNOSIS — A41.50 GRAM-NEGATIVE SEPSIS, UNSPECIFIED: ICD-10-CM

## 2019-04-05 DIAGNOSIS — K56.609 UNSPECIFIED INTESTINAL OBSTRUCTION, UNSPECIFIED AS TO PARTIAL VERSUS COMPLETE OBSTRUCTION: ICD-10-CM

## 2019-04-05 DIAGNOSIS — R50.81 FEVER PRESENTING WITH CONDITIONS CLASSIFIED ELSEWHERE: ICD-10-CM

## 2019-04-05 DIAGNOSIS — N40.1 BENIGN PROSTATIC HYPERPLASIA WITH LOWER URINARY TRACT SYMPTOMS: ICD-10-CM

## 2019-04-05 DIAGNOSIS — N39.0 URINARY TRACT INFECTION, SITE NOT SPECIFIED: ICD-10-CM

## 2019-04-05 LAB
-  AMIKACIN: SIGNIFICANT CHANGE UP
-  AMPICILLIN/SULBACTAM: SIGNIFICANT CHANGE UP
-  AMPICILLIN: SIGNIFICANT CHANGE UP
-  AZTREONAM: SIGNIFICANT CHANGE UP
-  CEFAZOLIN: SIGNIFICANT CHANGE UP
-  CEFEPIME: SIGNIFICANT CHANGE UP
-  CEFOXITIN: SIGNIFICANT CHANGE UP
-  CEFTRIAXONE: SIGNIFICANT CHANGE UP
-  CIPROFLOXACIN: SIGNIFICANT CHANGE UP
-  ERTAPENEM: SIGNIFICANT CHANGE UP
-  GENTAMICIN: SIGNIFICANT CHANGE UP
-  IMIPENEM: SIGNIFICANT CHANGE UP
-  LEVOFLOXACIN: SIGNIFICANT CHANGE UP
-  MEROPENEM: SIGNIFICANT CHANGE UP
-  NITROFURANTOIN: SIGNIFICANT CHANGE UP
-  PIPERACILLIN/TAZOBACTAM: SIGNIFICANT CHANGE UP
-  TIGECYCLINE: SIGNIFICANT CHANGE UP
-  TOBRAMYCIN: SIGNIFICANT CHANGE UP
-  TRIMETHOPRIM/SULFAMETHOXAZOLE: SIGNIFICANT CHANGE UP
ALBUMIN SERPL ELPH-MCNC: 3 G/DL — LOW (ref 3.3–5)
ALP SERPL-CCNC: 65 U/L — SIGNIFICANT CHANGE UP (ref 40–120)
ALT FLD-CCNC: 23 U/L — SIGNIFICANT CHANGE UP (ref 10–45)
ANION GAP SERPL CALC-SCNC: 11 MMOL/L — SIGNIFICANT CHANGE UP (ref 5–17)
AST SERPL-CCNC: 25 U/L — SIGNIFICANT CHANGE UP (ref 10–40)
BILIRUB SERPL-MCNC: 0.4 MG/DL — SIGNIFICANT CHANGE UP (ref 0.2–1.2)
BUN SERPL-MCNC: 15 MG/DL — SIGNIFICANT CHANGE UP (ref 7–23)
CALCIUM SERPL-MCNC: 8.1 MG/DL — LOW (ref 8.4–10.5)
CHLORIDE SERPL-SCNC: 101 MMOL/L — SIGNIFICANT CHANGE UP (ref 96–108)
CO2 SERPL-SCNC: 23 MMOL/L — SIGNIFICANT CHANGE UP (ref 22–31)
CREAT SERPL-MCNC: 0.87 MG/DL — SIGNIFICANT CHANGE UP (ref 0.5–1.3)
CULTURE RESULTS: SIGNIFICANT CHANGE UP
GLUCOSE SERPL-MCNC: 134 MG/DL — HIGH (ref 70–99)
HCT VFR BLD CALC: 29.9 % — LOW (ref 39–50)
HGB BLD-MCNC: 8.2 G/DL — LOW (ref 13–17)
MAGNESIUM SERPL-MCNC: 2.1 MG/DL — SIGNIFICANT CHANGE UP (ref 1.6–2.6)
MCHC RBC-ENTMCNC: 19 PG — LOW (ref 27–34)
MCHC RBC-ENTMCNC: 27.5 GM/DL — LOW (ref 32–36)
MCV RBC AUTO: 69.1 FL — LOW (ref 80–100)
METHOD TYPE: SIGNIFICANT CHANGE UP
ORGANISM # SPEC MICROSCOPIC CNT: SIGNIFICANT CHANGE UP
ORGANISM # SPEC MICROSCOPIC CNT: SIGNIFICANT CHANGE UP
PHOSPHATE SERPL-MCNC: 1.4 MG/DL — LOW (ref 2.5–4.5)
PLATELET # BLD AUTO: 191 K/UL — SIGNIFICANT CHANGE UP (ref 150–400)
POTASSIUM SERPL-MCNC: 3.6 MMOL/L — SIGNIFICANT CHANGE UP (ref 3.5–5.3)
POTASSIUM SERPL-SCNC: 3.6 MMOL/L — SIGNIFICANT CHANGE UP (ref 3.5–5.3)
PROT SERPL-MCNC: 5.6 G/DL — LOW (ref 6–8.3)
RBC # BLD: 4.33 M/UL — SIGNIFICANT CHANGE UP (ref 4.2–5.8)
RBC # FLD: 16.9 % — HIGH (ref 10.3–14.5)
SODIUM SERPL-SCNC: 135 MMOL/L — SIGNIFICANT CHANGE UP (ref 135–145)
SPECIMEN SOURCE: SIGNIFICANT CHANGE UP
WBC # BLD: 9.5 K/UL — SIGNIFICANT CHANGE UP (ref 3.8–10.5)
WBC # FLD AUTO: 9.5 K/UL — SIGNIFICANT CHANGE UP (ref 3.8–10.5)

## 2019-04-05 PROCEDURE — 76770 US EXAM ABDO BACK WALL COMP: CPT | Mod: 26

## 2019-04-05 PROCEDURE — 99223 1ST HOSP IP/OBS HIGH 75: CPT

## 2019-04-05 RX ORDER — TAMSULOSIN HYDROCHLORIDE 0.4 MG/1
0.4 CAPSULE ORAL
Qty: 0 | Refills: 0 | Status: DISCONTINUED | OUTPATIENT
Start: 2019-04-05 | End: 2019-04-07

## 2019-04-05 RX ORDER — KETOROLAC TROMETHAMINE 30 MG/ML
15 SYRINGE (ML) INJECTION ONCE
Qty: 0 | Refills: 0 | Status: DISCONTINUED | OUTPATIENT
Start: 2019-04-05 | End: 2019-04-05

## 2019-04-05 RX ORDER — POTASSIUM PHOSPHATE, MONOBASIC POTASSIUM PHOSPHATE, DIBASIC 236; 224 MG/ML; MG/ML
15 INJECTION, SOLUTION INTRAVENOUS
Qty: 0 | Refills: 0 | Status: COMPLETED | OUTPATIENT
Start: 2019-04-05 | End: 2019-04-06

## 2019-04-05 RX ORDER — POLYETHYLENE GLYCOL 3350 17 G/17G
17 POWDER, FOR SOLUTION ORAL DAILY
Qty: 0 | Refills: 0 | Status: DISCONTINUED | OUTPATIENT
Start: 2019-04-05 | End: 2019-04-07

## 2019-04-05 RX ORDER — ACETAMINOPHEN 500 MG
1000 TABLET ORAL ONCE
Qty: 0 | Refills: 0 | Status: COMPLETED | OUTPATIENT
Start: 2019-04-05 | End: 2019-04-05

## 2019-04-05 RX ORDER — CEFTRIAXONE 500 MG/1
INJECTION, POWDER, FOR SOLUTION INTRAMUSCULAR; INTRAVENOUS
Qty: 0 | Refills: 0 | Status: DISCONTINUED | OUTPATIENT
Start: 2019-04-05 | End: 2019-04-09

## 2019-04-05 RX ORDER — CEFTRIAXONE 500 MG/1
1 INJECTION, POWDER, FOR SOLUTION INTRAMUSCULAR; INTRAVENOUS EVERY 24 HOURS
Qty: 0 | Refills: 0 | Status: DISCONTINUED | OUTPATIENT
Start: 2019-04-06 | End: 2019-04-09

## 2019-04-05 RX ORDER — DEXTROSE MONOHYDRATE, SODIUM CHLORIDE, AND POTASSIUM CHLORIDE 50; .745; 4.5 G/1000ML; G/1000ML; G/1000ML
1000 INJECTION, SOLUTION INTRAVENOUS
Qty: 0 | Refills: 0 | Status: DISCONTINUED | OUTPATIENT
Start: 2019-04-05 | End: 2019-04-09

## 2019-04-05 RX ORDER — CEFTRIAXONE 500 MG/1
1 INJECTION, POWDER, FOR SOLUTION INTRAMUSCULAR; INTRAVENOUS ONCE
Qty: 0 | Refills: 0 | Status: COMPLETED | OUTPATIENT
Start: 2019-04-05 | End: 2019-04-05

## 2019-04-05 RX ADMIN — TAMSULOSIN HYDROCHLORIDE 0.4 MILLIGRAM(S): 0.4 CAPSULE ORAL at 13:38

## 2019-04-05 RX ADMIN — Medication 100 MILLIGRAM(S): at 20:31

## 2019-04-05 RX ADMIN — PANTOPRAZOLE SODIUM 40 MILLIGRAM(S): 20 TABLET, DELAYED RELEASE ORAL at 13:37

## 2019-04-05 RX ADMIN — PIPERACILLIN AND TAZOBACTAM 25 GRAM(S): 4; .5 INJECTION, POWDER, LYOPHILIZED, FOR SOLUTION INTRAVENOUS at 13:38

## 2019-04-05 RX ADMIN — DEXTROSE MONOHYDRATE, SODIUM CHLORIDE, AND POTASSIUM CHLORIDE 100 MILLILITER(S): 50; .745; 4.5 INJECTION, SOLUTION INTRAVENOUS at 20:31

## 2019-04-05 RX ADMIN — POTASSIUM PHOSPHATE, MONOBASIC POTASSIUM PHOSPHATE, DIBASIC 62.5 MILLIMOLE(S): 236; 224 INJECTION, SOLUTION INTRAVENOUS at 17:38

## 2019-04-05 RX ADMIN — Medication 1000 MILLIGRAM(S): at 18:38

## 2019-04-05 RX ADMIN — Medication 100 MILLIGRAM(S): at 05:39

## 2019-04-05 RX ADMIN — POLYETHYLENE GLYCOL 3350 17 GRAM(S): 17 POWDER, FOR SOLUTION ORAL at 13:38

## 2019-04-05 RX ADMIN — Medication 100 MILLIGRAM(S): at 13:37

## 2019-04-05 RX ADMIN — ENOXAPARIN SODIUM 40 MILLIGRAM(S): 100 INJECTION SUBCUTANEOUS at 13:38

## 2019-04-05 RX ADMIN — Medication 1000 MILLIGRAM(S): at 23:25

## 2019-04-05 RX ADMIN — Medication 400 MILLIGRAM(S): at 18:08

## 2019-04-05 RX ADMIN — Medication 15 MILLIGRAM(S): at 22:32

## 2019-04-05 RX ADMIN — CEFTRIAXONE 100 GRAM(S): 500 INJECTION, POWDER, FOR SOLUTION INTRAMUSCULAR; INTRAVENOUS at 21:40

## 2019-04-05 RX ADMIN — PIPERACILLIN AND TAZOBACTAM 25 GRAM(S): 4; .5 INJECTION, POWDER, LYOPHILIZED, FOR SOLUTION INTRAVENOUS at 05:39

## 2019-04-05 RX ADMIN — Medication 15 MILLIGRAM(S): at 22:02

## 2019-04-05 RX ADMIN — Medication 400 MILLIGRAM(S): at 22:54

## 2019-04-05 NOTE — CONSULT NOTE ADULT - SUBJECTIVE AND OBJECTIVE BOX
HPI  Patient with no known gu history presented with abdominal pain.  He was found to have a cecal mass c/w ca and now with irritative and obstructive voiding symptoms.  He had difficulty voiding with hesitancy, straining and incomplete emptying.  A mariscal was placed after which he co dysuria now improving but persistent obstructive symptoms.  He denies prior voiding complaints prior to admission.  He is a 73M w/ no PMHx presented with acute abdominal pain x12 hours. He states that the pain  and was generalized, worst in the epigastrium. He also noted that his abdomen was distended and felt nauseated no emesis. He has never had this pain before. He denies any recent fevers, chills, CP/SOB, diarrhea/constipation. Patient has never had a colonoscopy. In ED, patient's labs notable for leukocytosis to 10.4, venous lactate 1.7. CT abdomen pelvis demonstrates small bowel obstruction with transition point at level of ileocecal valve, associated with 5 cm cecal mass.     PAST MEDICAL & SURGICAL HISTORY:  No pertinent past medical history  No significant past surgical history      MEDICATIONS  (STANDING):  dextrose 5% + sodium chloride 0.45% with potassium chloride 20 mEq/L 1000 milliLiter(s) (100 mL/Hr) IV Continuous <Continuous>  enoxaparin Injectable 40 milliGRAM(s) SubCutaneous daily  pantoprazole  Injectable 40 milliGRAM(s) IV Push daily  phenazopyridine 100 milliGRAM(s) Oral every 8 hours  piperacillin/tazobactam IVPB. 3.375 Gram(s) IV Intermittent every 8 hours  polyethylene glycol 3350 17 Gram(s) Oral daily    FAMILY HISTORY:  no gu fam hx    Allergies    No Known Allergies    SOCIAL HISTORY: no tobacco etoh    REVIEW OF SYSTEMS: gen weak  heent neg neck neg resp neg cv neg  gi per hpi gu per hpi  msk neg neuro neg psych neg  all neg skin neg endo neg     Physical Exam  Vital signs  T(C): 37.2 (19 @ 05:23), Max: 39.3 (19 @ 16:48)  HR: 94 (19 @ 05:23)  BP: 139/71 (19 @ 05:23)  SpO2: 96% (19 @ 05:23)    Gen: WDWN  male in nad    heent ncat neck symm supple   cor reg chest clear   abd soft nd nt no masses   gu uncirc  no masses no cvat  ext no c/c/e  neuro non focal    LABS:       @ 06:56    WBC 9.5   / Hct 29.9  / SCr --        @ 07:30    WBC 18.1  / Hct 27.5  / SCr 1.03     04-    139  |  104  |  12  ----------------------------<  94  4.2   |  23  |  1.03    Ca    7.8<L>      2019 07:30  Phos  4.7     -  Mg     2.2     -    TPro  5.2<L>  /  Alb  2.8<L>  /  TBili  0.7  /  DBili  x   /  AST  27  /  ALT  19  /  AlkPhos  69  04-04      Urinalysis Basic - ( 2019 09:01 )    Color: Yellow / Appearance: Slightly Turbid / S.023 / pH: x  Gluc: x / Ketone: Large  / Bili: Negative / Urobili: Negative   Blood: x / Protein: 30 mg/dL / Nitrite: Positive   Leuk Esterase: Large / RBC: 6 /hpf /  /HPF   Sq Epi: x / Non Sq Epi: 1 /hpf / Bacteria: Many

## 2019-04-05 NOTE — CONSULT NOTE ADULT - ASSESSMENT
73M w/ no PMHx presented to the ED with acute abdominal pain x12 hours on 4/1/2019 that began around 8:30 PM the night prior to presentation and was generalized, worst in the epigastrium.  Imaging was obtained which revealed SBO secondary to a 5cm cecal mass.  Pt admitted and being prepped for surgery and was tachycardic and febrile this am.      -No acute indication for SICU admission  -Since admission pt has been approx 3L negative and coupled with a (+)UTI and fevers, most likely represents urosepsis  -Pt would benefit from a bolus of LR of approx 2L and repeat vital signs as per floor protocol  -Pt would benefit from a mariscal catheter as pt with c/o severe suprapubic pain and hesitancy and urgency vs straight cath q8hrs; will defer to the primary team  -Will continue to monitor  -D/w Dr. Vasquez
Patient with evidence of uti and retention.   flomax 2 x day  monitor pvr  renal us
pt is a 74 y/o male  w/ no PMHx presents with acute abdominal pain w/ small bowel obstruction secondary to new cecal mass now with fevers and tachycardia 2/2 to david UTI    agree w/ zosyn for now pending cultures  fluid resuscitation  - Surgery postponed   Is and Os  - NGT as per sx   analgesics prn   -f/u labs   tylenol prn   - Lovenox for DVT prophylaxis
73M w/ no PMHx presents with acute abdominal pain x12 hours with fever, leukocytosis, urine retention, GNR UTI/bacteremia, sepsis

## 2019-04-05 NOTE — PROGRESS NOTE ADULT - ASSESSMENT
pt is a 74 y/o male  w/ no PMHx presents with acute abdominal pain w/ small bowel obstruction secondary to new cecal mass now with fevers and tachycardia 2/2 to david UTI    +bc gram neg rods  repeat cultures id eval  bph  start flomax   fluid resuscitation  - Surgery postponed till infection cleared   would get id to clear pt    Is and Os  - NGT as per sx   analgesics prn   -f/u labs   tylenol prn   - Lovenox for DVT prophylaxis

## 2019-04-05 NOTE — PROGRESS NOTE ADULT - SUBJECTIVE AND OBJECTIVE BOX
[facility] Progress Note    Interval: Patient was tachy to 110s and febrile to 102.7F yesterday evening. CBC showing further leukocytosis. Blood cultures growing anaerobic gram neg rods.    SUBJECTIVE: Patient seen and examined at the bedside. Feeling well this morning. Tolerating clear liquids without nausea or vomiting. States that he experiences burning with urination and slow stream    Vital Signs (24 Hrs):  T(C): 36.9 (19 @ 01:27), Max: 39.3 (19 @ 16:48)  HR: 88 (19 @ 01:27) (88 - 107)  BP: 131/73 (19 @ 01:27) (104/66 - 136/69)  RR: 18 (19 @ 01:27) (17 - 18)  SpO2: 94% (19 @ 01:27) (92% - 96%)  Wt(kg): --  Daily     Daily     I&O's Summary    2019 07:01  -  2019 07:00  --------------------------------------------------------  IN: 5350 mL / OUT: 2325 mL / NET: 3025 mL    2019 07:01  -  2019 04:47  --------------------------------------------------------  IN: 1140 mL / OUT: 750 mL / NET: 390 mL        PHYSICAL EXAM:   General: NAD, Lying in bed comfortably, alert, oriented x3 NGT in place with bilious otuput  Pulm: Non-labored breathing on RA  GI/Abd: Soft, NT/ND, no rebound/guarding    LABS                        8.1    18.1  )-----------( 236      ( 2019 07:30 )             27.5       04    139  |  104  |  12  ----------------------------<  94  4.2   |  23  |  1.03    Ca    7.8<L>      2019 07:30  Phos  4.7     04-04  Mg     2.2     04-04    TPro  5.2<L>  /  Alb  2.8<L>  /  TBili  0.7  /  DBili  x   /  AST  27  /  ALT  19  /  AlkPhos  69  04-04      PT/INR - ( 2019 06:38 )   PT: 12.7 sec;   INR: 1.10 ratio         PTT - ( 2019 06:38 )  PTT:31.8 sec              Urinalysis Basic - ( 2019 09:01 )    Color: Yellow / Appearance: Slightly Turbid / S.023 / pH: x  Gluc: x / Ketone: Large  / Bili: Negative / Urobili: Negative   Blood: x / Protein: 30 mg/dL / Nitrite: Positive   Leuk Esterase: Large / RBC: 6 /hpf /  /HPF   Sq Epi: x / Non Sq Epi: 1 /hpf / Bacteria: Many [facility] Progress Note    Interval: Patient was tachy to 107 and febrile to 102.7F yesterday afternoon. CBC showing further leukocytosis. Blood cultures growing anaerobic gram neg rods. Patient passing flatus and had BM yesterday, NGT clamped and d/c. Started on clears. Hospitalist and Medicine consulted.    SUBJECTIVE: Patient seen and examined at the bedside. Feeling well this morning. Tolerating clear liquids without nausea or vomiting. +flatus/BM. States that he experiences burning with urination and slow stream    Vital Signs (24 Hrs):  T(C): 36.9 (19 @ 01:27), Max: 39.3 (19 @ 16:48)  HR: 88 (19 @ 01:27) (88 - 107)  BP: 131/73 (19 @ 01:27) (104/66 - 136/69)  RR: 18 (19 @ 01:27) (17 - 18)  SpO2: 94% (19 @ 01:27) (92% - 96%)  Wt(kg): --  Daily     Daily     I&O's Summary    2019 07:  -  2019 07:00  --------------------------------------------------------  IN: 5350 mL / OUT: 2325 mL / NET: 3025 mL    2019 07:  -  2019 04:47  --------------------------------------------------------  IN: 1140 mL / OUT: 750 mL / NET: 390 mL        PHYSICAL EXAM:   General: NAD, Lying in bed comfortably, alert, oriented x3  Pulm: Non-labored breathing on RA  GI/Abd: Soft, NT/ND, no rebound/guarding    LABS                        8.1    18.1  )-----------( 236      ( 2019 07:30 )             27.5       -04    139  |  104  |  12  ----------------------------<  94  4.2   |  23  |  1.03    Ca    7.8<L>      2019 07:30  Phos  4.7     04-  Mg     2.2     04-04    TPro  5.2<L>  /  Alb  2.8<L>  /  TBili  0.7  /  DBili  x   /  AST  27  /  ALT  19  /  AlkPhos  69  04-04      PT/INR - ( 2019 06:38 )   PT: 12.7 sec;   INR: 1.10 ratio         PTT - ( 2019 06:38 )  PTT:31.8 sec              Urinalysis Basic - ( 2019 09:01 )    Color: Yellow / Appearance: Slightly Turbid / S.023 / pH: x  Gluc: x / Ketone: Large  / Bili: Negative / Urobili: Negative   Blood: x / Protein: 30 mg/dL / Nitrite: Positive   Leuk Esterase: Large / RBC: 6 /hpf /  /HPF   Sq Epi: x / Non Sq Epi: 1 /hpf / Bacteria: Many [facility] Progress Note    Interval: Patient was tachy to 107 and febrile to 102.7F yesterday afternoon. CBC showing further leukocytosis. Blood cultures growing anaerobic gram neg rods. Patient passing flatus and had BM yesterday, NGT clamped and d/c. Started on clears. Hospitalist and Urology consulted.    SUBJECTIVE: Patient seen and examined at the bedside. Feeling well this morning. Tolerating clear liquids without nausea or vomiting. +flatus/BM. States that he experiences burning with urination and slow stream    Vital Signs (24 Hrs):  T(C): 36.9 (19 @ 01:27), Max: 39.3 (19 @ 16:48)  HR: 88 (19 @ 01:27) (88 - 107)  BP: 131/73 (19 @ 01:27) (104/66 - 136/69)  RR: 18 (19 @ 01:27) (17 - 18)  SpO2: 94% (19 @ 01:27) (92% - 96%)  Wt(kg): --  Daily     Daily     I&O's Summary    2019 07:  -  2019 07:00  --------------------------------------------------------  IN: 5350 mL / OUT: 2325 mL / NET: 3025 mL    2019 07:  -  2019 04:47  --------------------------------------------------------  IN: 1140 mL / OUT: 750 mL / NET: 390 mL        PHYSICAL EXAM:   General: NAD, Lying in bed comfortably, alert, oriented x3  Pulm: Non-labored breathing on RA  GI/Abd: Soft, NT/ND, no rebound/guarding    LABS                        8.1    18.1  )-----------( 236      ( 2019 07:30 )             27.5       -04    139  |  104  |  12  ----------------------------<  94  4.2   |  23  |  1.03    Ca    7.8<L>      2019 07:30  Phos  4.7     04-04  Mg     2.2     04-04    TPro  5.2<L>  /  Alb  2.8<L>  /  TBili  0.7  /  DBili  x   /  AST  27  /  ALT  19  /  AlkPhos  69  04-04      PT/INR - ( 2019 06:38 )   PT: 12.7 sec;   INR: 1.10 ratio         PTT - ( 2019 06:38 )  PTT:31.8 sec              Urinalysis Basic - ( 2019 09:01 )    Color: Yellow / Appearance: Slightly Turbid / S.023 / pH: x  Gluc: x / Ketone: Large  / Bili: Negative / Urobili: Negative   Blood: x / Protein: 30 mg/dL / Nitrite: Positive   Leuk Esterase: Large / RBC: 6 /hpf /  /HPF   Sq Epi: x / Non Sq Epi: 1 /hpf / Bacteria: Many

## 2019-04-05 NOTE — PROVIDER CONTACT NOTE (OTHER) - SITUATION
Pt c/o of increasing and excruciating penial pain upon urination and at rest. Pt states upon urination- it is very difficult to void and only "droplets" come out. Pt is in bed, extremely uncomfortable Pt c/o of increasing and excruciating penile pain upon urination and at rest. Pt states upon urination- it is very difficult to void and only "droplets" come out. Pt is in bed, extremely uncomfortable Pt c/o increasing and excruciating penile pain upon urination and at rest. Pt states upon urination- it is very difficult to void and only "droplets" come out. Pt is in bed, extremely uncomfortable

## 2019-04-05 NOTE — CONSULT NOTE ADULT - SUBJECTIVE AND OBJECTIVE BOX
EMILIO MULLER 73y Male  MRN-[id number]    Patient is a 73y old  Male who presents with a chief complaint of abdominal pain (05 Apr 2019 08:42)      HPI:  73M w/ no PMHx presents with acute abdominal pain x12 hours. He states that the pain began last night around 8:30 PM and was generalized, worst in the epigastrium. He also noted that his abdomen was distended and felt nauseated no emesis. He has never had this pain before. He denies any recent fevers, chills, CP/SOB, diarrhea/constipation. Last BM/flatus was yesterday PM. Patient has never had a colonoscopy.     In ED, patient's labs notable for leukocytosis to 10.4, venous lactate 1.7. CT abdomen pelvis demonstrates small bowel obstruction with transition point at level of ileocecal valve, associated with 5 cm cecal mass. (01 Apr 2019 10:59)    Noted to be in retention yesterday    Pt with GNR bacteremia      PAST MEDICAL & SURGICAL HISTORY:  No pertinent past medical history  No significant past surgical history      Allergies    No Known Allergies    Intolerances        ANTIMICROBIALS:  piperacillin/tazobactam IVPB. 3.375 every 8 hours      MEDICATIONS  (STANDING):  dextrose 5% + sodium chloride 0.45% with potassium chloride 20 mEq/L 1000 milliLiter(s) (100 mL/Hr) IV Continuous <Continuous>  enoxaparin Injectable 40 milliGRAM(s) SubCutaneous daily  pantoprazole  Injectable 40 milliGRAM(s) IV Push daily  phenazopyridine 100 milliGRAM(s) Oral every 8 hours  piperacillin/tazobactam IVPB. 3.375 Gram(s) IV Intermittent every 8 hours  polyethylene glycol 3350 17 Gram(s) Oral daily  potassium phosphate IVPB 15 milliMole(s) IV Intermittent two times a day  tamsulosin 0.4 milliGRAM(s) Oral two times a day      Social History  Smoking: no  Etoh: no  Drug use: no  Born in [place]      FAMILY HISTORY: No family hx in first degree relatives. No pertinent family hx in relation to chief complaint      Vital Signs Last 24 Hrs  T(C): 36.8 (05 Apr 2019 10:43), Max: 39.3 (04 Apr 2019 16:48)  T(F): 98.2 (05 Apr 2019 10:43), Max: 102.7 (04 Apr 2019 16:48)  HR: 87 (05 Apr 2019 10:43) (87 - 107)  BP: 146/76 (05 Apr 2019 10:43) (124/73 - 146/76)  BP(mean): --  RR: 17 (05 Apr 2019 10:43) (17 - 18)  SpO2: 94% (05 Apr 2019 10:43) (92% - 96%)    CBC Full  -  ( 05 Apr 2019 06:56 )  WBC Count : 9.5 K/uL  RBC Count : 4.33 M/uL  Hemoglobin : 8.2 g/dL  Hematocrit : 29.9 %  Platelet Count - Automated : 191 K/uL  Mean Cell Volume : 69.1 fl  Mean Cell Hemoglobin : 19.0 pg  Mean Cell Hemoglobin Concentration : 27.5 gm/dL  Auto Neutrophil # : x  Auto Lymphocyte # : x  Auto Monocyte # : x  Auto Eosinophil # : x  Auto Basophil # : x  Auto Neutrophil % : x  Auto Lymphocyte % : x  Auto Monocyte % : x  Auto Eosinophil % : x  Auto Basophil % : x    04-05    135  |  101  |  15  ----------------------------<  134<H>  3.6   |  23  |  0.87    Ca    8.1<L>      05 Apr 2019 09:28  Phos  1.4     04-05  Mg     2.1     04-05    TPro  5.6<L>  /  Alb  3.0<L>  /  TBili  0.4  /  DBili  x   /  AST  25  /  ALT  23  /  AlkPhos  65  04-05    LIVER FUNCTIONS - ( 05 Apr 2019 09:28 )  Alb: 3.0 g/dL / Pro: 5.6 g/dL / ALK PHOS: 65 U/L / ALT: 23 U/L / AST: 25 U/L / GGT: x               MICROBIOLOGY:  .Blood Blood-Peripheral  04-03-19   Growth in aerobic bottle: Gram Negative Rods  "Due to technical problems, Proteus sp. will Not be reported as part of  the BCID panel until further notice"  ***Blood Panel PCR results on this specimen are available  approximately 3 hours after the Gram stain result.***  Gram stain, PCR, and/or culture results may not always  correspond due to difference in methodologies.  ************************************************************  This PCR assay was performed using [employer].  The following targets are tested for: Enterococcus,  vancomycin resistant enterococci, Listeria monocytogenes,  coagulase negative staphylococci, S. aureus,  methicillin resistant S. aureus, Streptococcus agalactiae  (Group B), S. pneumoniae, S. pyogenes (Group A),  Acinetobacter baumannii, Enterobacter cloacae, E. coli,  Klebsiella oxytoca, K. pneumoniae, Proteus sp.,  Serratia marcescens, Haemophilus influenzae,  Neisseria meningitidis, Pseudomonas aeruginosa, Candida  albicans, C. glabrata, C krusei, C parapsilosis,  C. tropicalis and the KPC resistance gene.  --  Blood Culture PCR      .Urine Clean Catch (Midstream)  04-03-19   >100,000 CFU/ml Gram Negative Rods  --  --      .Blood Blood-Peripheral  04-03-19   No growth to date.  --  --      .Urine Clean Catch (Midstream)  04-01-19   <10,000 CFU/mL Normal Urogenital Amalia  --  --      RADIOLOGY  CT Abdomen and Pelvis w/ Oral Cont (04.03.19 @ 12:38) >  Resolving small bowel obstruction with redemonstration of cecal mass.    Redemonstration of long segment enteritis involving the distal and   terminal ileum, etiology is unclear.  Decreased pelvic free fluid.     Wall thickening with minimal surrounding inflammatory changes involving   the urinary bladder; correlate with urinalysis.       Xray Chest 1 View- PORTABLE-Urgent (04.03.19 @ 09:50) >  NG tube tip below diaphragm

## 2019-04-05 NOTE — PROGRESS NOTE ADULT - ATTENDING COMMENTS
I saw and examined the pt and discussed the tx plan with the House Staff. I agree with the exam and plan as documented in the surgery resident's note from today.  Surgery cancelled yesterday d/t UTI causing picture of sepsis.  Discussed with daughter at the bedside.  Burning on urination resolved. + flatus, BMs.   No abd pain.  Last episode of fever and tachycardia last night.  Abdomen soft, NT, ND.  CT with resolution of SBO, the fecalization of loops of distal SB has resolved and SB mush less distended - however the distal SB is thickened (d/t the recent obstruction).  Will need at least a few days to recover from the UTI.  Await Cx results.  Monitor NGT this morning, depending on amount we may be able to do clamping trial this afternoon, if he tolerates would d/c, give him Miralax and liquid diet and plan for surgery (and possible colonoscopy) next week.  If he continues to require NGT, we will plan for surgery in the next few days.  Medicine and Urology consults.   Sarah Painter MD I saw and examined the pt and discussed the tx plan with the House Staff. I agree with the exam and plan as documented in the surgery resident's note from today which I edited as indicated.  Fever last night.  + blood and urine Cx.  + bowel fx.  Abdomen benign.  WBC normalized today.  Start Miralax daily, clears with regular Ensure tid.   Plan for slow prep Sun-Mon and colonoscopy tuesday morning, followed by colectomy tuesday afternoon.  ID consult.  Sarah Painter MD

## 2019-04-05 NOTE — PROVIDER CONTACT NOTE (OTHER) - BACKGROUND
Pt admitted for SBO and cecal mass. SBO resolved- pt now having BM's with no difficulty. Pt now experiencing difficulty urinating upon diagnosis of UTI.

## 2019-04-05 NOTE — PROGRESS NOTE ADULT - ASSESSMENT
73M w/ no PMHx presents with acute abdominal pain concerning for small bowel obstruction secondary to newly imaged 5 cm cecal mass now with fevers and tachycardia 2/2 UTI vs +blood cultures.     - NPO + IVF   - Surgery postponed  - Zosyn for UTI  - Strict Is and Os  - NGT  - No standing pain medication, requires surgical team evaluation prior to pain medication dosing   - No home medications  - Lovenox for DVT prophylaxis    Surgery Pager [phone] 73M w/ no PMHx presents with acute abdominal pain concerning for small bowel obstruction secondary to newly imaged 5 cm cecal mass now with fevers and tachycardia 2/2 UTI/bacteremia      - NPO + IVF   - Surgery postponed  - Zosyn for UTI  - Strict Is and Os  - NGT  - No standing pain medication, requires surgical team evaluation prior to pain medication dosing   - No home medications  - Lovenox for DVT prophylaxis    Surgery Pager [phone] 73M w/ no PMHx presents with acute abdominal pain concerning for small bowel obstruction secondary to newly imaged 5 cm cecal mass now with fevers and tachycardia 2/2 UTI/bacteremia      - CLD w ensure shakes  - start miralax qd  - Surgery postponed  - Zosyn for UTI  - Strict Is and Os  - Appreciate Hospitalist rec  - F/u urology consult   - No home medications  - Lovenox for DVT prophylaxis    Surgery Pager [phone]

## 2019-04-05 NOTE — PROGRESS NOTE ADULT - SUBJECTIVE AND OBJECTIVE BOX
CHIEF COMPLAINT:Patient is a 73y old  Male who presents with a chief complaint of abdominal pain (05 Apr 2019 08:42)    	        PAST MEDICAL & SURGICAL HISTORY:  No pertinent past medical history  No significant past surgical history          REVIEW OF SYSTEMS:  CONSTITUTIONAL: weak  EYES: No eye pain, visual disturbances, or discharge  NECK: No pain or stiffness  RESPIRATORY: No cough, wheezing, chills or hemoptysis; No Shortness of Breath  CARDIOVASCULAR: No chest pain, palpitations, passing out, dizziness, or leg swelling  GASTROINTESTINAL: abd complaints better  GENITOURINARY: +dysuria /slows tream  NEUROLOGICAL: No headaches    Medications:  MEDICATIONS  (STANDING):  dextrose 5% + sodium chloride 0.45% with potassium chloride 20 mEq/L 1000 milliLiter(s) (100 mL/Hr) IV Continuous <Continuous>  enoxaparin Injectable 40 milliGRAM(s) SubCutaneous daily  pantoprazole  Injectable 40 milliGRAM(s) IV Push daily  phenazopyridine 100 milliGRAM(s) Oral every 8 hours  piperacillin/tazobactam IVPB. 3.375 Gram(s) IV Intermittent every 8 hours  polyethylene glycol 3350 17 Gram(s) Oral daily  potassium phosphate IVPB 15 milliMole(s) IV Intermittent two times a day  tamsulosin 0.4 milliGRAM(s) Oral two times a day    MEDICATIONS  (PRN):    	    PHYSICAL EXAM:  T(C): 37.2 (04-05-19 @ 05:23), Max: 39.3 (04-04-19 @ 16:48)  HR: 94 (04-05-19 @ 05:23) (88 - 107)  BP: 139/71 (04-05-19 @ 05:23) (124/73 - 139/71)  RR: 18 (04-05-19 @ 05:23) (17 - 18)  SpO2: 96% (04-05-19 @ 05:23) (92% - 96%)  Wt(kg): --  I&O's Summary    04 Apr 2019 07:01  -  05 Apr 2019 07:00  --------------------------------------------------------  IN: 1780 mL / OUT: 1050 mL / NET: 730 mL    05 Apr 2019 07:01  -  05 Apr 2019 10:19  --------------------------------------------------------  IN: 0 mL / OUT: 100 mL / NET: -100 mL        Appearance: Normal	  HEENT:   Normal oral mucosa, PERRL, EOMI	  Lymphatic: No lymphadenopathy  Cardiovascular: Normal S1 S2, No JVD, No murmurs,   Respiratory: Lungs clear to auscultation	  Psychiatry: A & O x 3, Mood & affect appropriate  Gastrointestinal:  Soft, dec tenderness  Skin: No rashes, No ecchymoses, No cyanosis	  Neurologic: Non-focal  Extremities: Normal range of motion, No clubbing, cyanosis or edema  Vascular: Peripheral pulses palpable 2+ bilaterally    TELEMETRY: 	    ECG:  	  RADIOLOGY:  OTHER: 	  	  LABS:	 	    CARDIAC MARKERS:                                8.2    9.5   )-----------( 191      ( 05 Apr 2019 06:56 )             29.9     04-05    135  |  101  |  15  ----------------------------<  134<H>  3.6   |  23  |  0.87    Ca    8.1<L>      05 Apr 2019 09:28  Phos  1.4     04-05  Mg     2.1     04-05    TPro  5.6<L>  /  Alb  3.0<L>  /  TBili  0.4  /  DBili  x   /  AST  25  /  ALT  23  /  AlkPhos  65  04-05    proBNP:   Lipid Profile:   HgA1c:   TSH: [phone]

## 2019-04-05 NOTE — CONSULT NOTE ADULT - ATTENDING COMMENTS
Pt evaluated with Dr Forrest MULLER sec to cecal mass  Possible UTI sepsis intravascular depletion, responded to IVF hydration/abx feeling better hemodynamically stable mentating well  Continue further hydration, reconsult SICU if change in status  Spoke to his wife at bedside and surgery team  Thanks
Jude Kingsley  Attending Physician   Division of Infectious Disease  Pager #[phone]  After 5pm/weekend or no response, call #[phone]    Please call the ID service [phone] with questions or concerns over the weekend.

## 2019-04-06 LAB
-  AMIKACIN: SIGNIFICANT CHANGE UP
-  AMPICILLIN/SULBACTAM: SIGNIFICANT CHANGE UP
-  AMPICILLIN: SIGNIFICANT CHANGE UP
-  AZTREONAM: SIGNIFICANT CHANGE UP
-  CEFAZOLIN: SIGNIFICANT CHANGE UP
-  CEFEPIME: SIGNIFICANT CHANGE UP
-  CEFOXITIN: SIGNIFICANT CHANGE UP
-  CEFTRIAXONE: SIGNIFICANT CHANGE UP
-  CIPROFLOXACIN: SIGNIFICANT CHANGE UP
-  ERTAPENEM: SIGNIFICANT CHANGE UP
-  GENTAMICIN: SIGNIFICANT CHANGE UP
-  IMIPENEM: SIGNIFICANT CHANGE UP
-  LEVOFLOXACIN: SIGNIFICANT CHANGE UP
-  MEROPENEM: SIGNIFICANT CHANGE UP
-  PIPERACILLIN/TAZOBACTAM: SIGNIFICANT CHANGE UP
-  TOBRAMYCIN: SIGNIFICANT CHANGE UP
-  TRIMETHOPRIM/SULFAMETHOXAZOLE: SIGNIFICANT CHANGE UP
ANION GAP SERPL CALC-SCNC: 12 MMOL/L — SIGNIFICANT CHANGE UP (ref 5–17)
BUN SERPL-MCNC: 9 MG/DL — SIGNIFICANT CHANGE UP (ref 7–23)
CALCIUM SERPL-MCNC: 8.6 MG/DL — SIGNIFICANT CHANGE UP (ref 8.4–10.5)
CHLORIDE SERPL-SCNC: 104 MMOL/L — SIGNIFICANT CHANGE UP (ref 96–108)
CO2 SERPL-SCNC: 21 MMOL/L — LOW (ref 22–31)
CREAT SERPL-MCNC: 0.76 MG/DL — SIGNIFICANT CHANGE UP (ref 0.5–1.3)
CULTURE RESULTS: SIGNIFICANT CHANGE UP
GLUCOSE SERPL-MCNC: 140 MG/DL — HIGH (ref 70–99)
HCT VFR BLD CALC: 29.7 % — LOW (ref 39–50)
HGB BLD-MCNC: 8.8 G/DL — LOW (ref 13–17)
MAGNESIUM SERPL-MCNC: 2.3 MG/DL — SIGNIFICANT CHANGE UP (ref 1.6–2.6)
MCHC RBC-ENTMCNC: 19 PG — LOW (ref 27–34)
MCHC RBC-ENTMCNC: 29.6 GM/DL — LOW (ref 32–36)
MCV RBC AUTO: 64.3 FL — LOW (ref 80–100)
METHOD TYPE: SIGNIFICANT CHANGE UP
ORGANISM # SPEC MICROSCOPIC CNT: SIGNIFICANT CHANGE UP
PHOSPHATE SERPL-MCNC: 1.6 MG/DL — LOW (ref 2.5–4.5)
PLATELET # BLD AUTO: 226 K/UL — SIGNIFICANT CHANGE UP (ref 150–400)
POTASSIUM SERPL-MCNC: 3.4 MMOL/L — LOW (ref 3.5–5.3)
POTASSIUM SERPL-SCNC: 3.4 MMOL/L — LOW (ref 3.5–5.3)
RBC # BLD: 4.63 M/UL — SIGNIFICANT CHANGE UP (ref 4.2–5.8)
RBC # FLD: 16.2 % — HIGH (ref 10.3–14.5)
SODIUM SERPL-SCNC: 137 MMOL/L — SIGNIFICANT CHANGE UP (ref 135–145)
SPECIMEN SOURCE: SIGNIFICANT CHANGE UP
WBC # BLD: 6.5 K/UL — SIGNIFICANT CHANGE UP (ref 3.8–10.5)
WBC # FLD AUTO: 6.5 K/UL — SIGNIFICANT CHANGE UP (ref 3.8–10.5)

## 2019-04-06 PROCEDURE — 99232 SBSQ HOSP IP/OBS MODERATE 35: CPT

## 2019-04-06 RX ORDER — ACETAMINOPHEN 500 MG
1000 TABLET ORAL ONCE
Qty: 0 | Refills: 0 | Status: COMPLETED | OUTPATIENT
Start: 2019-04-06 | End: 2019-04-06

## 2019-04-06 RX ORDER — LANOLIN ALCOHOL/MO/W.PET/CERES
3 CREAM (GRAM) TOPICAL AT BEDTIME
Qty: 0 | Refills: 0 | Status: DISCONTINUED | OUTPATIENT
Start: 2019-04-06 | End: 2019-04-09

## 2019-04-06 RX ORDER — ACETAMINOPHEN 500 MG
1000 TABLET ORAL ONCE
Qty: 0 | Refills: 0 | Status: DISCONTINUED | OUTPATIENT
Start: 2019-04-06 | End: 2019-04-09

## 2019-04-06 RX ORDER — METOPROLOL TARTRATE 50 MG
5 TABLET ORAL ONCE
Qty: 0 | Refills: 0 | Status: DISCONTINUED | OUTPATIENT
Start: 2019-04-06 | End: 2019-04-06

## 2019-04-06 RX ORDER — POTASSIUM PHOSPHATE, MONOBASIC POTASSIUM PHOSPHATE, DIBASIC 236; 224 MG/ML; MG/ML
30 INJECTION, SOLUTION INTRAVENOUS ONCE
Qty: 0 | Refills: 0 | Status: COMPLETED | OUTPATIENT
Start: 2019-04-06 | End: 2019-04-06

## 2019-04-06 RX ADMIN — Medication 1000 MILLIGRAM(S): at 14:30

## 2019-04-06 RX ADMIN — DEXTROSE MONOHYDRATE, SODIUM CHLORIDE, AND POTASSIUM CHLORIDE 100 MILLILITER(S): 50; .745; 4.5 INJECTION, SOLUTION INTRAVENOUS at 05:35

## 2019-04-06 RX ADMIN — Medication 100 MILLIGRAM(S): at 05:35

## 2019-04-06 RX ADMIN — TAMSULOSIN HYDROCHLORIDE 0.4 MILLIGRAM(S): 0.4 CAPSULE ORAL at 17:03

## 2019-04-06 RX ADMIN — CEFTRIAXONE 100 GRAM(S): 500 INJECTION, POWDER, FOR SOLUTION INTRAMUSCULAR; INTRAVENOUS at 18:34

## 2019-04-06 RX ADMIN — Medication 400 MILLIGRAM(S): at 21:03

## 2019-04-06 RX ADMIN — PANTOPRAZOLE SODIUM 40 MILLIGRAM(S): 20 TABLET, DELAYED RELEASE ORAL at 11:46

## 2019-04-06 RX ADMIN — Medication 400 MILLIGRAM(S): at 13:51

## 2019-04-06 RX ADMIN — POTASSIUM PHOSPHATE, MONOBASIC POTASSIUM PHOSPHATE, DIBASIC 83.33 MILLIMOLE(S): 236; 224 INJECTION, SOLUTION INTRAVENOUS at 11:40

## 2019-04-06 RX ADMIN — POLYETHYLENE GLYCOL 3350 17 GRAM(S): 17 POWDER, FOR SOLUTION ORAL at 12:20

## 2019-04-06 RX ADMIN — ENOXAPARIN SODIUM 40 MILLIGRAM(S): 100 INJECTION SUBCUTANEOUS at 11:45

## 2019-04-06 RX ADMIN — Medication 100 MILLIGRAM(S): at 12:20

## 2019-04-06 RX ADMIN — Medication 100 MILLIGRAM(S): at 21:04

## 2019-04-06 RX ADMIN — Medication 3 MILLIGRAM(S): at 21:51

## 2019-04-06 RX ADMIN — Medication 1000 MILLIGRAM(S): at 21:33

## 2019-04-06 RX ADMIN — POTASSIUM PHOSPHATE, MONOBASIC POTASSIUM PHOSPHATE, DIBASIC 62.5 MILLIMOLE(S): 236; 224 INJECTION, SOLUTION INTRAVENOUS at 05:35

## 2019-04-06 RX ADMIN — TAMSULOSIN HYDROCHLORIDE 0.4 MILLIGRAM(S): 0.4 CAPSULE ORAL at 05:35

## 2019-04-06 RX ADMIN — DEXTROSE MONOHYDRATE, SODIUM CHLORIDE, AND POTASSIUM CHLORIDE 100 MILLILITER(S): 50; .745; 4.5 INJECTION, SOLUTION INTRAVENOUS at 17:02

## 2019-04-06 NOTE — PROGRESS NOTE ADULT - SUBJECTIVE AND OBJECTIVE BOX
EMILIO MULLER 73y [id number]    Patient is a 73y old  Male who presents with a chief complaint of abdominal pain (05 Apr 2019 08:42)      Follow Up/CC:  ID following for bacteremia    Interval History/ROS: feels better, no fever, still with difficulty voiding     Allergies    No Known Allergies    Intolerances        ANTIMICROBIALS:  cefTRIAXone   IVPB    cefTRIAXone   IVPB 1 every 24 hours      MEDICATIONS  (STANDING):  acetaminophen  IVPB .. 1000 milliGRAM(s) IV Intermittent once  acetaminophen  IVPB .. 1000 milliGRAM(s) IV Intermittent once  acetaminophen  IVPB .. 1000 milliGRAM(s) IV Intermittent once  cefTRIAXone   IVPB      cefTRIAXone   IVPB 1 Gram(s) IV Intermittent every 24 hours  dextrose 5% + sodium chloride 0.45% with potassium chloride 20 mEq/L 1000 milliLiter(s) (100 mL/Hr) IV Continuous <Continuous>  enoxaparin Injectable 40 milliGRAM(s) SubCutaneous daily  pantoprazole  Injectable 40 milliGRAM(s) IV Push daily  phenazopyridine 100 milliGRAM(s) Oral every 8 hours  polyethylene glycol 3350 17 Gram(s) Oral daily  tamsulosin 0.4 milliGRAM(s) Oral two times a day    MEDICATIONS  (PRN):        Vital Signs Last 24 Hrs  T(C): 36.8 (06 Apr 2019 05:40), Max: 37.5 (05 Apr 2019 17:42)  T(F): 98.2 (06 Apr 2019 05:40), Max: 99.5 (05 Apr 2019 17:42)  HR: 80 (06 Apr 2019 05:40) (80 - 107)  BP: 153/81 (06 Apr 2019 05:40) (128/75 - 158/78)  BP(mean): --  RR: 18 (06 Apr 2019 05:40) (17 - 18)  SpO2: 97% (06 Apr 2019 05:40) (94% - 98%)    CBC Full  -  ( 05 Apr 2019 06:56 )  WBC Count : 9.5 K/uL  RBC Count : 4.33 M/uL  Hemoglobin : 8.2 g/dL  Hematocrit : 29.9 %  Platelet Count - Automated : 191 K/uL  Mean Cell Volume : 69.1 fl  Mean Cell Hemoglobin : 19.0 pg  Mean Cell Hemoglobin Concentration : 27.5 gm/dL  Auto Neutrophil # : x  Auto Lymphocyte # : x  Auto Monocyte # : x  Auto Eosinophil # : x  Auto Basophil # : x  Auto Neutrophil % : x  Auto Lymphocyte % : x  Auto Monocyte % : x  Auto Eosinophil % : x  Auto Basophil % : x    04-06    137  |  104  |  9   ----------------------------<  140<H>  3.4<L>   |  21<L>  |  0.76    Ca    8.6      06 Apr 2019 07:37  Phos  1.6     04-06  Mg     2.3     04-06    TPro  5.6<L>  /  Alb  3.0<L>  /  TBili  0.4  /  DBili  x   /  AST  25  /  ALT  23  /  AlkPhos  65  04-05    LIVER FUNCTIONS - ( 05 Apr 2019 09:28 )  Alb: 3.0 g/dL / Pro: 5.6 g/dL / ALK PHOS: 65 U/L / ALT: 23 U/L / AST: 25 U/L / GGT: x               MICROBIOLOGY:  .Blood Blood-Peripheral  04-03-19   Growth in aerobic bottle: Escherichia coli  "Due to technical problems, Proteus sp. will Not be reported as part of  the BCID panel until further notice"  ***Blood Panel PCR results on this specimen are available  approximately 3 hours after the Gram stain result.***  Gram stain, PCR, and/or culture results may not always  correspond due to difference in methodologies.  ************************************************************  This PCR assay was performed using [employer].  The following targets are tested for: Enterococcus,  vancomycin resistant enterococci, Listeria monocytogenes,  coagulase negative staphylococci, S. aureus,  methicillin resistant S. aureus, Streptococcus agalactiae  (Group B), S. pneumoniae, S. pyogenes (Group A),  Acinetobacter baumannii, Enterobacter cloacae, E. coli,  Klebsiella oxytoca, K. pneumoniae, Proteus sp.,  Serratia marcescens, Haemophilus influenzae,  Neisseria meningitidis, Pseudomonas aeruginosa, Candida  albicans, C. glabrata, C krusei, C parapsilosis,  C. tropicalis and the KPC resistance gene.  --  Blood Culture PCR      .Urine Clean Catch (Midstream)  04-03-19   >100,000 CFU/ml Escherichia coli  --  Escherichia coli      .Blood Blood-Peripheral  04-03-19   No growth to date.  --  --      .Urine Clean Catch (Midstream)  04-01-19   <10,000 CFU/mL Normal Urogenital Amalia  --  --      RADIOLOGY    < from: US Kidney and Bladder (04.05.19 @ 16:55) >  Normal renal ultrasound.    < end of copied text >

## 2019-04-06 NOTE — PROGRESS NOTE ADULT - ASSESSMENT
73M w/ no PMHx presents with acute abdominal pain x12 hours with fever, leukocytosis, urine retention, GNR UTI/bacteremia, sepsis

## 2019-04-06 NOTE — PROGRESS NOTE ADULT - ASSESSMENT
pt is a 72 y/o male  w/ no PMHx presents with acute abdominal pain w/ small bowel obstruction secondary to new cecal mass now with fevers and tachycardia 2/2 to david UTI    +bc gram neg rods bacteremia  repeat cultures   id eval noted  cont abs   bph   flomax   fluid resuscitation prn  - Surgery postponed till infection cleared    Is and Os  analgesics prn   -f/u labs noted  supp k+ and phos  leukocytosis improved   tylenol prn   - Lovenox for DVT prophylaxis

## 2019-04-06 NOTE — PROGRESS NOTE ADULT - ASSESSMENT
Patient with obstructive voiding symptoms and uti    improved on current therapy  continue abx per id  flomax  monitor pvr   repeat urine culture preop    No gu contraindication to gi surgery next week.

## 2019-04-06 NOTE — PROGRESS NOTE ADULT - ATTENDING COMMENTS
Jude Kingsley  Attending Physician   Division of Infectious Disease  Pager #[phone]  After 5pm/weekend or no response, call #[phone]    Please call the ID service [phone] with questions or concerns over the weekend.

## 2019-04-06 NOTE — PROGRESS NOTE ADULT - SUBJECTIVE AND OBJECTIVE BOX
GENERAL SURGERY DAILY PROGRESS NOTE:       Subjective:  Pt seen and examined at bedside. Pt c/o severe burning and frequency of urination overnight. (-)N/V, (+) flatus/BM. UCx sensitivities returned and abx switched from zosyn to ceftriaxone      Objective:    PE:  General: NAD, Lying in bed comfortably, alert, oriented x3  Pulm: Non-labored breathing on RA  GI/Abd: Soft, NT/ND, no rebound/guarding    Vital Signs Last 24 Hrs  T(C): 36.7 (06 Apr 2019 01:05), Max: 37.5 (05 Apr 2019 17:42)  T(F): 98 (06 Apr 2019 01:05), Max: 99.5 (05 Apr 2019 17:42)  HR: 86 (06 Apr 2019 01:05) (86 - 107)  BP: 146/73 (06 Apr 2019 01:05) (128/75 - 158/78)  BP(mean): --  RR: 18 (06 Apr 2019 01:05) (17 - 18)  SpO2: 97% (06 Apr 2019 01:05) (94% - 98%)    I&O's Detail    04 Apr 2019 07:01  -  05 Apr 2019 07:00  --------------------------------------------------------  IN:    lactated ringers.: 1200 mL    Oral Fluid: 480 mL    Solution: 100 mL  Total IN: 1780 mL    OUT:    Nasoenteral Tube: 250 mL    Voided: 800 mL  Total OUT: 1050 mL    Total NET: 730 mL      05 Apr 2019 07:01  -  06 Apr 2019 02:24  --------------------------------------------------------  IN:    dextrose 5% + sodium chloride 0.45% with potassium chloride 20 mEq/L: 2000 mL    Oral Fluid: 820 mL    Solution: 100 mL  Total IN: 2920 mL    OUT:    Voided: 975 mL  Total OUT: 975 mL    Total NET: 1945 mL          Daily     Daily     MEDICATIONS  (STANDING):  cefTRIAXone   IVPB      cefTRIAXone   IVPB 1 Gram(s) IV Intermittent every 24 hours  dextrose 5% + sodium chloride 0.45% with potassium chloride 20 mEq/L 1000 milliLiter(s) (100 mL/Hr) IV Continuous <Continuous>  enoxaparin Injectable 40 milliGRAM(s) SubCutaneous daily  pantoprazole  Injectable 40 milliGRAM(s) IV Push daily  phenazopyridine 100 milliGRAM(s) Oral every 8 hours  polyethylene glycol 3350 17 Gram(s) Oral daily  potassium phosphate IVPB 15 milliMole(s) IV Intermittent two times a day  tamsulosin 0.4 milliGRAM(s) Oral two times a day    MEDICATIONS  (PRN):      LABS:                        8.2    9.5   )-----------( 191      ( 05 Apr 2019 06:56 )             29.9     04-05    135  |  101  |  15  ----------------------------<  134<H>  3.6   |  23  |  0.87    Ca    8.1<L>      05 Apr 2019 09:28  Phos  1.4     04-05  Mg     2.1     04-05    TPro  5.6<L>  /  Alb  3.0<L>  /  TBili  0.4  /  DBili  x   /  AST  25  /  ALT  23  /  AlkPhos  65  04-05          RADIOLOGY & ADDITIONAL STUDIES:

## 2019-04-06 NOTE — PROGRESS NOTE ADULT - ASSESSMENT
73M w/ no PMHx presents with acute abdominal pain concerning for small bowel obstruction secondary to newly imaged 5 cm cecal mass now with fevers and tachycardia 2/2 UTI/bacteremia      - CLD w ensure shakes  - start miralax qd  - Surgery postponed until next week   - Ceftriaxone for UTI  - Strict Is and Os  - Appreciate Hospitalist rec  - F/u urology consult   - No home medications  - Lovenox for DVT prophylaxis    Surgery Pager [phone]

## 2019-04-06 NOTE — PROGRESS NOTE ADULT - SUBJECTIVE AND OBJECTIVE BOX
CHIEF COMPLAINT:Patient is a 73y old  Male who presents with a chief complaint of colon mass (06 Apr 2019 09:38)    	        PAST MEDICAL & SURGICAL HISTORY:  No pertinent past medical history  No significant past surgical history          REVIEW OF SYSTEMS:  CONSTITUTIONAL: No fever, weight loss, or fatigue  EYES: No eye pain, visual disturbances, or discharge  NECK: No pain or stiffness  RESPIRATORY: No cough, wheezing, chills or hemoptysis; No Shortness of Breath  CARDIOVASCULAR: No chest pain, palpitations, passing out, dizziness, or leg swelling  GASTROINTESTINAL: some abd discomfort   GENITOURINARY: No dysuria, frequency, hematuria, or incontinence  NEUROLOGICAL: No headaches, memory loss, loss of strength, numbness, or tremors  MUSCULOSKELETAL: No joint pain or swelling; No muscle, back, or extremity pain    Medications:  MEDICATIONS  (STANDING):  acetaminophen  IVPB .. 1000 milliGRAM(s) IV Intermittent once  acetaminophen  IVPB .. 1000 milliGRAM(s) IV Intermittent once  acetaminophen  IVPB .. 1000 milliGRAM(s) IV Intermittent once  cefTRIAXone   IVPB      cefTRIAXone   IVPB 1 Gram(s) IV Intermittent every 24 hours  dextrose 5% + sodium chloride 0.45% with potassium chloride 20 mEq/L 1000 milliLiter(s) (100 mL/Hr) IV Continuous <Continuous>  enoxaparin Injectable 40 milliGRAM(s) SubCutaneous daily  pantoprazole  Injectable 40 milliGRAM(s) IV Push daily  phenazopyridine 100 milliGRAM(s) Oral every 8 hours  polyethylene glycol 3350 17 Gram(s) Oral daily  potassium phosphate IVPB 30 milliMole(s) IV Intermittent once  tamsulosin 0.4 milliGRAM(s) Oral two times a day    MEDICATIONS  (PRN):    	    PHYSICAL EXAM:  T(C): 36.8 (04-06-19 @ 08:59), Max: 37.5 (04-05-19 @ 17:42)  HR: 85 (04-06-19 @ 08:59) (80 - 107)  BP: 153/77 (04-06-19 @ 08:59) (128/75 - 158/78)  RR: 17 (04-06-19 @ 08:59) (17 - 18)  SpO2: 97% (04-06-19 @ 08:59) (94% - 98%)  Wt(kg): --  I&O's Summary    05 Apr 2019 07:01 - 06 Apr 2019 07:00  --------------------------------------------------------  IN: 4020 mL / OUT: 1175 mL / NET: 2845 mL    06 Apr 2019 07:01 - 06 Apr 2019 10:34  --------------------------------------------------------  IN: 220 mL / OUT: 50 mL / NET: 170 mL        Appearance: Normal	  HEENT:   Normal oral mucosa, PERRL, EOMI	  Lymphatic: No lymphadenopathy  Cardiovascular: Normal S1 S2, No JVD, No murmurs, No edema  Respiratory: Lungs clear to auscultation	  Psychiatry: A & O x 3, Mood & affect appropriate  Gastrointestinal:  Soft, mild distention no r/g   Skin: No rashes, No ecchymoses, No cyanosis	  Neurologic: Non-focal  Extremities: Normal range of motion, No clubbing, cyanosis or edema  Vascular: Peripheral pulses palpable 2+ bilaterally    TELEMETRY: 	    ECG:  	  RADIOLOGY:  OTHER: 	  	  LABS:	 	    CARDIAC MARKERS:                                8.8    6.5   )-----------( 226      ( 06 Apr 2019 07:37 )             29.7     04-06    137  |  104  |  9   ----------------------------<  140<H>  3.4<L>   |  21<L>  |  0.76    Ca    8.6      06 Apr 2019 07:37  Phos  1.6     04-06  Mg     2.3     04-06    TPro  5.6<L>  /  Alb  3.0<L>  /  TBili  0.4  /  DBili  x   /  AST  25  /  ALT  23  /  AlkPhos  65  04-05    proBNP:   Lipid Profile:   HgA1c:   TSH:

## 2019-04-06 NOTE — PROVIDER CONTACT NOTE (OTHER) - SITUATION
Pt currently hypertensive at 174/85. Pt's blood pressure has been trending upward through the evening.

## 2019-04-06 NOTE — PROGRESS NOTE ADULT - ATTENDING COMMENTS
Surgery Attending    Pt seen and examined; agree with above assessment and plan    Still w some suprapubic discomfort  Tolerating PO fluids  +flatus    Continue anti'bx for UTI  Continue PO liquids  Start bowel prep tomorrow

## 2019-04-06 NOTE — PROGRESS NOTE ADULT - SUBJECTIVE AND OBJECTIVE BOX
Subjective  improved voiding symptoms but persistent sp and lower abd discomfort    Objective  lower abdominal tenderness    Vital signs  T(F): , Max: 99.5 (04-05-19 @ 17:42)  HR: 85 (04-06-19 @ 08:59)  BP: 153/77 (04-06-19 @ 08:59)  SpO2: 97% (04-06-19 @ 08:59)  Wt(kg): --    Output     04-05 @ 07:01  -  04-06 @ 07:00  --------------------------------------------------------  IN: 4020 mL / OUT: 1175 mL / NET: 2845 mL    04-06 @ 07:01  -  04-06 @ 09:39  --------------------------------------------------------  IN: 220 mL / OUT: 50 mL / NET: 170 mL        Gen awake and alert  Abd soft no ted guarding infrapubic tenderness   no cvat + sp tenderness and fullness    Labs      04-06 @ 07:37    WBC 6.5   / Hct 29.7  / SCr 0.76     04-05 @ 09:28    WBC --    / Hct --    / SCr 0.87

## 2019-04-07 LAB
ANION GAP SERPL CALC-SCNC: 12 MMOL/L — SIGNIFICANT CHANGE UP (ref 5–17)
APPEARANCE UR: CLEAR — SIGNIFICANT CHANGE UP
BILIRUB UR-MCNC: NEGATIVE — SIGNIFICANT CHANGE UP
BUN SERPL-MCNC: 4 MG/DL — LOW (ref 7–23)
CALCIUM SERPL-MCNC: 8.6 MG/DL — SIGNIFICANT CHANGE UP (ref 8.4–10.5)
CHLORIDE SERPL-SCNC: 104 MMOL/L — SIGNIFICANT CHANGE UP (ref 96–108)
CO2 SERPL-SCNC: 23 MMOL/L — SIGNIFICANT CHANGE UP (ref 22–31)
COLOR SPEC: ABNORMAL
CREAT SERPL-MCNC: 0.67 MG/DL — SIGNIFICANT CHANGE UP (ref 0.5–1.3)
DIFF PNL FLD: NEGATIVE — SIGNIFICANT CHANGE UP
GLUCOSE SERPL-MCNC: 132 MG/DL — HIGH (ref 70–99)
GLUCOSE UR QL: NEGATIVE — SIGNIFICANT CHANGE UP
HCT VFR BLD CALC: 29.7 % — LOW (ref 39–50)
HGB BLD-MCNC: 8.4 G/DL — LOW (ref 13–17)
KETONES UR-MCNC: NEGATIVE — SIGNIFICANT CHANGE UP
LEUKOCYTE ESTERASE UR-ACNC: ABNORMAL
MAGNESIUM SERPL-MCNC: 2.1 MG/DL — SIGNIFICANT CHANGE UP (ref 1.6–2.6)
MCHC RBC-ENTMCNC: 18.5 PG — LOW (ref 27–34)
MCHC RBC-ENTMCNC: 28.3 GM/DL — LOW (ref 32–36)
MCV RBC AUTO: 65.5 FL — LOW (ref 80–100)
NITRITE UR-MCNC: POSITIVE
PH UR: 7 — SIGNIFICANT CHANGE UP (ref 5–8)
PHOSPHATE SERPL-MCNC: 2 MG/DL — LOW (ref 2.5–4.5)
PLATELET # BLD AUTO: 221 K/UL — SIGNIFICANT CHANGE UP (ref 150–400)
POTASSIUM SERPL-MCNC: 3.6 MMOL/L — SIGNIFICANT CHANGE UP (ref 3.5–5.3)
POTASSIUM SERPL-SCNC: 3.6 MMOL/L — SIGNIFICANT CHANGE UP (ref 3.5–5.3)
PROT UR-MCNC: NEGATIVE — SIGNIFICANT CHANGE UP
RBC # BLD: 4.53 M/UL — SIGNIFICANT CHANGE UP (ref 4.2–5.8)
RBC # FLD: 16.4 % — HIGH (ref 10.3–14.5)
SODIUM SERPL-SCNC: 139 MMOL/L — SIGNIFICANT CHANGE UP (ref 135–145)
SP GR SPEC: 1.01 — LOW (ref 1.01–1.02)
UROBILINOGEN FLD QL: NEGATIVE — SIGNIFICANT CHANGE UP
WBC # BLD: 4.2 K/UL — SIGNIFICANT CHANGE UP (ref 3.8–10.5)
WBC # FLD AUTO: 4.2 K/UL — SIGNIFICANT CHANGE UP (ref 3.8–10.5)

## 2019-04-07 PROCEDURE — 99232 SBSQ HOSP IP/OBS MODERATE 35: CPT

## 2019-04-07 RX ORDER — DOXAZOSIN MESYLATE 4 MG
4 TABLET ORAL AT BEDTIME
Qty: 0 | Refills: 0 | Status: DISCONTINUED | OUTPATIENT
Start: 2019-04-07 | End: 2019-04-09

## 2019-04-07 RX ORDER — METOPROLOL TARTRATE 50 MG
5 TABLET ORAL ONCE
Qty: 0 | Refills: 0 | Status: COMPLETED | OUTPATIENT
Start: 2019-04-07 | End: 2019-04-07

## 2019-04-07 RX ORDER — SOD SULF/SODIUM/NAHCO3/KCL/PEG
1 SOLUTION, RECONSTITUTED, ORAL ORAL ONCE
Qty: 0 | Refills: 0 | Status: DISCONTINUED | OUTPATIENT
Start: 2019-04-07 | End: 2019-04-07

## 2019-04-07 RX ORDER — SOD SULF/SODIUM/NAHCO3/KCL/PEG
1 SOLUTION, RECONSTITUTED, ORAL ORAL ONCE
Qty: 0 | Refills: 0 | Status: COMPLETED | OUTPATIENT
Start: 2019-04-08 | End: 2019-04-08

## 2019-04-07 RX ORDER — ACETAMINOPHEN 500 MG
1000 TABLET ORAL ONCE
Qty: 0 | Refills: 0 | Status: COMPLETED | OUTPATIENT
Start: 2019-04-07 | End: 2019-04-07

## 2019-04-07 RX ORDER — POTASSIUM PHOSPHATE, MONOBASIC POTASSIUM PHOSPHATE, DIBASIC 236; 224 MG/ML; MG/ML
30 INJECTION, SOLUTION INTRAVENOUS ONCE
Qty: 0 | Refills: 0 | Status: COMPLETED | OUTPATIENT
Start: 2019-04-07 | End: 2019-04-07

## 2019-04-07 RX ORDER — SOD SULF/SODIUM/NAHCO3/KCL/PEG
1 SOLUTION, RECONSTITUTED, ORAL ORAL ONCE
Qty: 0 | Refills: 0 | Status: COMPLETED | OUTPATIENT
Start: 2019-04-07 | End: 2019-04-07

## 2019-04-07 RX ORDER — SOD SULF/SODIUM/NAHCO3/KCL/PEG
1 SOLUTION, RECONSTITUTED, ORAL ORAL DAILY
Qty: 0 | Refills: 0 | Status: DISCONTINUED | OUTPATIENT
Start: 2019-04-07 | End: 2019-04-07

## 2019-04-07 RX ADMIN — PANTOPRAZOLE SODIUM 40 MILLIGRAM(S): 20 TABLET, DELAYED RELEASE ORAL at 13:36

## 2019-04-07 RX ADMIN — Medication 5 MILLIGRAM(S): at 18:02

## 2019-04-07 RX ADMIN — Medication 1 LITER(S): at 13:36

## 2019-04-07 RX ADMIN — Medication 1000 MILLIGRAM(S): at 14:06

## 2019-04-07 RX ADMIN — Medication 100 MILLIGRAM(S): at 13:36

## 2019-04-07 RX ADMIN — CEFTRIAXONE 100 GRAM(S): 500 INJECTION, POWDER, FOR SOLUTION INTRAMUSCULAR; INTRAVENOUS at 18:03

## 2019-04-07 RX ADMIN — Medication 100 MILLIGRAM(S): at 05:19

## 2019-04-07 RX ADMIN — Medication 3 MILLIGRAM(S): at 21:12

## 2019-04-07 RX ADMIN — Medication 100 MILLIGRAM(S): at 21:12

## 2019-04-07 RX ADMIN — POTASSIUM PHOSPHATE, MONOBASIC POTASSIUM PHOSPHATE, DIBASIC 83.33 MILLIMOLE(S): 236; 224 INJECTION, SOLUTION INTRAVENOUS at 13:36

## 2019-04-07 RX ADMIN — ENOXAPARIN SODIUM 40 MILLIGRAM(S): 100 INJECTION SUBCUTANEOUS at 13:36

## 2019-04-07 RX ADMIN — Medication 4 MILLIGRAM(S): at 21:12

## 2019-04-07 RX ADMIN — DEXTROSE MONOHYDRATE, SODIUM CHLORIDE, AND POTASSIUM CHLORIDE 100 MILLILITER(S): 50; .745; 4.5 INJECTION, SOLUTION INTRAVENOUS at 05:20

## 2019-04-07 RX ADMIN — Medication 400 MILLIGRAM(S): at 13:36

## 2019-04-07 RX ADMIN — TAMSULOSIN HYDROCHLORIDE 0.4 MILLIGRAM(S): 0.4 CAPSULE ORAL at 05:19

## 2019-04-07 NOTE — PROGRESS NOTE ADULT - ASSESSMENT
persistent irritative voiding symptoms but with stable pvr <300cc    consider ct cystogram  repeat urine culture    surgical therapy per gen surg

## 2019-04-07 NOTE — PROGRESS NOTE ADULT - ATTENDING COMMENTS
Jude Kingsley  Attending Physician   Division of Infectious Disease  Pager #[phone]  After 5pm/weekend or no response, call #[phone]    Please call the ID service [phone] with questions or concerns over the weekend.

## 2019-04-07 NOTE — PROGRESS NOTE ADULT - SUBJECTIVE AND OBJECTIVE BOX
EMILIO MULLER 73y [id number]    Patient is a 73y old  Male who presents with a chief complaint of colon mass (06 Apr 2019 09:38)      Follow Up/CC:  ID following for uti    Interval History/ROS: feels ok, no fever    Allergies    No Known Allergies    Intolerances        ANTIMICROBIALS:  cefTRIAXone   IVPB    cefTRIAXone   IVPB 1 every 24 hours      MEDICATIONS  (STANDING):  acetaminophen  IVPB .. 1000 milliGRAM(s) IV Intermittent once  cefTRIAXone   IVPB      cefTRIAXone   IVPB 1 Gram(s) IV Intermittent every 24 hours  dextrose 5% + sodium chloride 0.45% with potassium chloride 20 mEq/L 1000 milliLiter(s) (100 mL/Hr) IV Continuous <Continuous>  enoxaparin Injectable 40 milliGRAM(s) SubCutaneous daily  melatonin 3 milliGRAM(s) Oral at bedtime  pantoprazole  Injectable 40 milliGRAM(s) IV Push daily  phenazopyridine 100 milliGRAM(s) Oral every 8 hours  polyethylene glycol 3350 17 Gram(s) Oral daily  tamsulosin 0.4 milliGRAM(s) Oral two times a day    MEDICATIONS  (PRN):        Vital Signs Last 24 Hrs  T(C): 36.6 (07 Apr 2019 05:37), Max: 36.8 (06 Apr 2019 08:59)  T(F): 97.8 (07 Apr 2019 05:37), Max: 98.2 (06 Apr 2019 08:59)  HR: 80 (07 Apr 2019 05:37) (70 - 93)  BP: 167/84 (07 Apr 2019 05:37) (153/77 - 174/85)  BP(mean): --  RR: 18 (07 Apr 2019 05:37) (17 - 18)  SpO2: 99% (07 Apr 2019 05:37) (97% - 99%)    CBC Full  -  ( 06 Apr 2019 07:37 )  WBC Count : 6.5 K/uL  RBC Count : 4.63 M/uL  Hemoglobin : 8.8 g/dL  Hematocrit : 29.7 %  Platelet Count - Automated : 226 K/uL  Mean Cell Volume : 64.3 fl  Mean Cell Hemoglobin : 19.0 pg  Mean Cell Hemoglobin Concentration : 29.6 gm/dL  Auto Neutrophil # : x  Auto Lymphocyte # : x  Auto Monocyte # : x  Auto Eosinophil # : x  Auto Basophil # : x  Auto Neutrophil % : x  Auto Lymphocyte % : x  Auto Monocyte % : x  Auto Eosinophil % : x  Auto Basophil % : x    04-06    137  |  104  |  9   ----------------------------<  140<H>  3.4<L>   |  21<L>  |  0.76    Ca    8.6      06 Apr 2019 07:37  Phos  1.6     04-06  Mg     2.3     04-06    TPro  5.6<L>  /  Alb  3.0<L>  /  TBili  0.4  /  DBili  x   /  AST  25  /  ALT  23  /  AlkPhos  65  04-05    LIVER FUNCTIONS - ( 05 Apr 2019 09:28 )  Alb: 3.0 g/dL / Pro: 5.6 g/dL / ALK PHOS: 65 U/L / ALT: 23 U/L / AST: 25 U/L / GGT: x               MICROBIOLOGY:  .Blood Blood  04-05-19   No growth to date.  --  --      .Blood Blood-Peripheral  04-03-19   Growth in aerobic bottle: Escherichia coli  "Due to technical problems, Proteus sp. will Not be reported as part of  the BCID panel until further notice"  ***Blood Panel PCR results on this specimen are available  approximately 3 hours after the Gram stain result.***  Gram stain, PCR, and/or culture results may not always  correspond due to difference in methodologies.  ************************************************************  This PCR assay was performed using [employer].  The following targets are tested for: Enterococcus,  vancomycin resistant enterococci, Listeria monocytogenes,  coagulase negative staphylococci, S. aureus,  methicillin resistant S. aureus, Streptococcus agalactiae  (Group B), S. pneumoniae, S. pyogenes (Group A),  Acinetobacter baumannii, Enterobacter cloacae, E. coli,  Klebsiella oxytoca, K. pneumoniae, Proteus sp.,  Serratia marcescens, Haemophilus influenzae,  Neisseria meningitidis, Pseudomonas aeruginosa, Candida  albicans, C. glabrata, C krusei, C parapsilosis,  C. tropicalis and the KPC resistance gene.  --  Blood Culture PCR  Escherichia coli      .Urine Clean Catch (Midstream)  04-03-19   >100,000 CFU/ml Escherichia coli  --  Escherichia coli      .Blood Blood-Peripheral  04-03-19   No growth to date.  --  --      .Urine Clean Catch (Midstream)  04-01-19   <10,000 CFU/mL Normal Urogenital Amalia  --  --            RADIOLOGY    US Kidney and Bladder (04.05.19 @ 16:55) >  Normal renal ultrasound.

## 2019-04-07 NOTE — PROGRESS NOTE ADULT - SUBJECTIVE AND OBJECTIVE BOX
CHIEF COMPLAINT:Patient is a 73y old  Male who presents with a chief complaint of uti incomplete emptying (07 Apr 2019 10:32)    	        PAST MEDICAL & SURGICAL HISTORY:  No pertinent past medical history  No significant past surgical history          REVIEW OF SYSTEMS:  CONSTITUTIONAL: feels better  EYES: No eye pain, visual disturbances, or discharge  NECK: No pain or stiffness  RESPIRATORY: No cough, wheezing, chills or hemoptysis; No Shortness of Breath  CARDIOVASCULAR: No chest pain, palpitations, passing out, dizziness, or leg swelling  GASTROINTESTINAL: abd discomfort at times   GENITOURINARY: No dysuria, frequency, hematuria, or incontinence  NEUROLOGICAL: No headaches, memory loss, loss of strength, numbness, or tremors  MUSCULOSKELETAL: No joint pain or swelling; No muscle, back, or extremity pain    Medications:  MEDICATIONS  (STANDING):  acetaminophen  IVPB .. 1000 milliGRAM(s) IV Intermittent once  cefTRIAXone   IVPB      cefTRIAXone   IVPB 1 Gram(s) IV Intermittent every 24 hours  dextrose 5% + sodium chloride 0.45% with potassium chloride 20 mEq/L 1000 milliLiter(s) (100 mL/Hr) IV Continuous <Continuous>  enoxaparin Injectable 40 milliGRAM(s) SubCutaneous daily  melatonin 3 milliGRAM(s) Oral at bedtime  pantoprazole  Injectable 40 milliGRAM(s) IV Push daily  phenazopyridine 100 milliGRAM(s) Oral every 8 hours  polyethylene glycol/electrolyte Solution 1 Liter(s) Oral once  potassium phosphate IVPB 30 milliMole(s) IV Intermittent once  tamsulosin 0.4 milliGRAM(s) Oral two times a day    MEDICATIONS  (PRN):    	    PHYSICAL EXAM:  T(C): 36.9 (04-07-19 @ 09:29), Max: 36.9 (04-07-19 @ 09:29)  HR: 99 (04-07-19 @ 09:29) (70 - 99)  BP: 168/87 (04-07-19 @ 09:29) (155/73 - 174/85)  RR: 16 (04-07-19 @ 09:29) (16 - 18)  SpO2: 99% (04-07-19 @ 09:29) (97% - 99%)  Wt(kg): --  I&O's Summary    06 Apr 2019 07:01  -  07 Apr 2019 07:00  --------------------------------------------------------  IN: 4240 mL / OUT: 650 mL / NET: 3590 mL    07 Apr 2019 07:01  -  07 Apr 2019 11:23  --------------------------------------------------------  IN: 240 mL / OUT: 400 mL / NET: -160 mL        Appearance: Normal	  HEENT:   Normal oral mucosa, PERRL, EOMI	  Lymphatic: No lymphadenopathy  Cardiovascular: Normal S1 S2, No JVD, No murmurs, No edema  Respiratory: Lungs clear to auscultation	  Psychiatry: A & O x 3, Mood & affect appropriate  Gastrointestinal:  Soft, mild distention no r/g  Skin: No rashes, No ecchymoses, No cyanosis	  Neurologic: Non-focal  Extremities: Normal range of motion, No clubbing, cyanosis or edema  Vascular: Peripheral pulses palpable 2+ bilaterally    TELEMETRY: 	    ECG:  	  RADIOLOGY:  OTHER: 	  	  LABS:	 	    CARDIAC MARKERS:                                8.4    4.2   )-----------( 221      ( 07 Apr 2019 06:38 )             29.7     04-07    139  |  104  |  4<L>  ----------------------------<  132<H>  3.6   |  23  |  0.67    Ca    8.6      07 Apr 2019 06:37  Phos  2.0     04-07  Mg     2.1     04-07      proBNP:   Lipid Profile:   HgA1c:   TSH:

## 2019-04-07 NOTE — PROVIDER CONTACT NOTE (OTHER) - DATE AND TIME:
02-Apr-2019 00:20
03-Apr-2019 06:30
03-Apr-2019 10:15
03-Apr-2019 21:50
03-Apr-2019 22:00
04-Apr-2019 17:07
05-Apr-2019 13:58
05-Apr-2019 21:52
06-Apr-2019 16:33
06-Apr-2019 20:40
07-Apr-2019 13:15
03-Apr-2019 07:56

## 2019-04-07 NOTE — PROVIDER CONTACT NOTE (OTHER) - ASSESSMENT
NG tube dislodged from pt, moderate red output noted
, /71, satting 92% on RA
Patient denies discomfort or Nausea
Pt BP elevated, stating that he has intense pain while urinating, frequency and only urinating small amounts at a time
Pt anox, vitals otherwise stable. Pt has slight pain in lower abd at this time.
Pt anox4, vitals WDL. Pt abdomen noted as firm and as per bladder scan- pt retaining 200mL s/p void. Bowel sounds audible and BM noted. Pt c/o of excruciating pain upon urination and now at baseline in penis.
Pt complaining of pain and urgency with urination, denies chest pain, SOB
Pt denies discomfort
Pt denies discomfort. HR 87 temp 97.9, RR 18 , O2 sat 97% on room air
Pt's NG tube replaced about 30min ago by MD, Denies chest pain, SOB, HA, n/v, generalized abd pain. NG tube w/ blood-tinged output. A&Ox4, alert.
pt unable to void, pt voided 25cc twice stated he feels like more has to come out, bladder scan 606cc
Pt is A&Ox4. Pt febrile 102.7 and . All other vitals stable

## 2019-04-07 NOTE — PROVIDER CONTACT NOTE (OTHER) - REASON
Blood tinged color NGT output
Heart Rate 115
Pt /87
Pt complaining of excruciating penial pain upon urination and now at rest.
Pt complaining of intense pain while urinating
Pt febrile 102.7
Pt febrile 102.7 and 
Pt hypertensive at 174/85
Pt is hypertensive
pt febrile 101.5, , /84
pt unable to void, bladder scan 606cc
Pt reported coughing and NG tube fell out

## 2019-04-07 NOTE — PROGRESS NOTE ADULT - ASSESSMENT
73M w/ no PMHx presents with acute abdominal pain concerning for small bowel obstruction secondary to newly imaged 5 cm cecal mass now with fevers and tachycardia 2/2 UTI/bacteremia      - CLD w ensure  - miralax qd  - Surgery postponed until next week   - Ceftriaxone for UTI  - Strict Is and Os  - Appreciate Hospitalist rec  - F/u urology consult   - No home medications  - Lovenox for DVT prophylaxis    Surgery Pager [phone] 73M w/ no PMHx presents with acute abdominal pain concerning for small bowel obstruction secondary to newly imaged 5 cm cecal mass now with fevers and tachycardia 2/2 UTI/bacteremia      - CLD w ensure  - Moviprep today and Monday  - C-scope 4/9 morning, surgery 4/9 afternoon  - Ceftriaxone for UTI  - Strict Is and Os  - Appreciate Hospitalist rec  - F/u urology consult   - No home medications  - Lovenox for DVT prophylaxis    Surgery Pager [phone]

## 2019-04-07 NOTE — PROVIDER CONTACT NOTE (OTHER) - ACTION/TREATMENT ORDERED:
possible replacement of NG tube
No orders made/ continue observe patient
CXR, EKG, CBC, BMP, blood cultures, urinalysis & urine cultures
MD Tawana made aware- no changes will be made. Will possibly put in order for tylenol/motrin for pain relief. RN will continue to monitor patient.
MD made aware- lopressor ordered. RN rechecked BP before giving dose and BP was 160/84. As per Irwin- hold dose and continue to monitor patient.
MD stated he will come up to floor to assess pt and then put in orders. Will continue to monitor pt
No new order given
None . Pt s resting in  bed no complaint offered.
continue to monitor
continue to monitor
give flomax early and continue to monitor
md aware, straight cath pt, Rowena Swedish speaking nurse spoke with pt & wife & explained situation to them, they were agreeable to straight cath, will give pt 8 hours to void, will continue to monitor

## 2019-04-07 NOTE — PROGRESS NOTE ADULT - SUBJECTIVE AND OBJECTIVE BOX
Subjective  patient still co sp pain and increasing urinary urgency and frequency     Objective  pvr 200 cc    Vital signs  T(F): , Max: 98.4 (04-07-19 @ 09:29)  HR: 99 (04-07-19 @ 09:29)  BP: 168/87 (04-07-19 @ 09:29)  SpO2: 99% (04-07-19 @ 09:29)  Wt(kg): --    Output     04-06 @ 07:01  -  04-07 @ 07:00  --------------------------------------------------------  IN: 4240 mL / OUT: 650 mL / NET: 3590 mL    04-07 @ 07:01  -  04-07 @ 10:33  --------------------------------------------------------  IN: 240 mL / OUT: 400 mL / NET: -160 mL        Gen  awake and alert nad  Abd  soft sp tenderness no ted   no cvat no palpable bladder    Labs      04-07 @ 06:38    WBC 4.2   / Hct 29.7  / SCr --       04-07 @ 06:37    WBC --    / Hct --    / SCr 0.67

## 2019-04-07 NOTE — PROGRESS NOTE ADULT - ASSESSMENT
pt is a 72 y/o male  w/ no PMHx presents with acute abdominal pain w/ small bowel obstruction secondary to new cecal mass now with fevers and tachycardia 2/2 to david UTI    +bc gram neg rods bacteremia  repeat cultures neg thus far    id eval noted  cont abs   bph   flomax   fluid resuscitation prn  - Surgery postponed till infection cleared   likey can get sx this week if cleared by id   analgesics prn   -f/u labs noted  supp k+ and phos prn  leukocytosis improved   tylenol prn   - Lovenox for DVT prophylaxis

## 2019-04-07 NOTE — PROGRESS NOTE ADULT - SUBJECTIVE AND OBJECTIVE BOX
GENERAL SURGERY DAILY PROGRESS NOTE:       Subjective:  Pt seen and examined at bedside. Pt c/o severe burning and frequency of urination overnight. (-)N/V, (+) flatus/BM. UCx sensitivities returned and abx switched from zosyn to ceftriaxone    Objective:    PE:  General: NAD, Lying in bed comfortably, alert, oriented x3  Pulm: Non-labored breathing on RA  GI/Abd: Soft, NT/ND, no rebound/guarding    Vital Signs (24 Hrs):  T(C): 36.6 (19 @ 05:37), Max: 36.8 (19 @ 08:59)  HR: 80 (19 @ 05:37) (70 - 93)  BP: 167/84 (19 @ 05:37) (153/77 - 174/85)  RR: 18 (19 @ 05:37) (17 - 18)  SpO2: 99% (19 @ 05:37) (97% - 99%)  Wt(kg): --  Daily     Daily Weight in k.5 (2019 08:59)    I&O's Summary    2019 07:  -  2019 07:00  --------------------------------------------------------  IN: 4020 mL / OUT: 1175 mL / NET: 2845 mL    2019 07:  -  2019 06:17  --------------------------------------------------------  IN: 4240 mL / OUT: 550 mL / NET: 3690 mL        LABS:                                 8.8    6.5   )-----------( 226      ( 2019 07:37 )             29.7       04-06    137  |  104  |  9   ----------------------------<  140<H>  3.4<L>   |  21<L>  |  0.76    Ca    8.6      2019 07:37  Phos  1.6     04-06  Mg     2.3     04-06    TPro  5.6<L>  /  Alb  3.0<L>  /  TBili  0.4  /  DBili  x   /  AST  25  /  ALT  23  /  AlkPhos  65  04-05

## 2019-04-07 NOTE — PROGRESS NOTE ADULT - ATTENDING COMMENTS
I have seen and examined the patient.  I agree with the surgical resident's note.  AVSS - having BM' and flatus - to start bowel prep - Colonoscopy and right colon Tuesday    Hugo Bhat contact information [phone]

## 2019-04-07 NOTE — PROVIDER CONTACT NOTE (OTHER) - NAME OF MD/NP/PA/DO NOTIFIED:
DR Sukhwinder Davis
Dr Davis
Dr. Gonzales
MD Gilliam
MD Hiram Cardenas
MD Ruffin
MD Sukhwinder Davis
Margarita Irwin MD
Md Davis
Ottoniel Gilliam MD.
may kaufman
MD Holland

## 2019-04-08 ENCOUNTER — TRANSCRIPTION ENCOUNTER (OUTPATIENT)
Age: 74
End: 2019-04-08

## 2019-04-08 ENCOUNTER — APPOINTMENT (OUTPATIENT)
Dept: SURGERY | Facility: CLINIC | Age: 74
End: 2019-04-08

## 2019-04-08 LAB
ANION GAP SERPL CALC-SCNC: 12 MMOL/L — SIGNIFICANT CHANGE UP (ref 5–17)
BUN SERPL-MCNC: 5 MG/DL — LOW (ref 7–23)
CALCIUM SERPL-MCNC: 8.5 MG/DL — SIGNIFICANT CHANGE UP (ref 8.4–10.5)
CHLORIDE SERPL-SCNC: 105 MMOL/L — SIGNIFICANT CHANGE UP (ref 96–108)
CO2 SERPL-SCNC: 22 MMOL/L — SIGNIFICANT CHANGE UP (ref 22–31)
CREAT SERPL-MCNC: 0.71 MG/DL — SIGNIFICANT CHANGE UP (ref 0.5–1.3)
CULTURE RESULTS: SIGNIFICANT CHANGE UP
GLUCOSE SERPL-MCNC: 122 MG/DL — HIGH (ref 70–99)
HCT VFR BLD CALC: 26.3 % — LOW (ref 39–50)
HGB BLD-MCNC: 7.7 G/DL — LOW (ref 13–17)
MAGNESIUM SERPL-MCNC: 2.1 MG/DL — SIGNIFICANT CHANGE UP (ref 1.6–2.6)
MCHC RBC-ENTMCNC: 18.9 PG — LOW (ref 27–34)
MCHC RBC-ENTMCNC: 29.5 GM/DL — LOW (ref 32–36)
MCV RBC AUTO: 64.1 FL — LOW (ref 80–100)
PHOSPHATE SERPL-MCNC: 2.5 MG/DL — SIGNIFICANT CHANGE UP (ref 2.5–4.5)
PLATELET # BLD AUTO: 238 K/UL — SIGNIFICANT CHANGE UP (ref 150–400)
POTASSIUM SERPL-MCNC: 3.6 MMOL/L — SIGNIFICANT CHANGE UP (ref 3.5–5.3)
POTASSIUM SERPL-SCNC: 3.6 MMOL/L — SIGNIFICANT CHANGE UP (ref 3.5–5.3)
RBC # BLD: 4.1 M/UL — LOW (ref 4.2–5.8)
RBC # FLD: 16 % — HIGH (ref 10.3–14.5)
SODIUM SERPL-SCNC: 139 MMOL/L — SIGNIFICANT CHANGE UP (ref 135–145)
SPECIMEN SOURCE: SIGNIFICANT CHANGE UP
WBC # BLD: 4 K/UL — SIGNIFICANT CHANGE UP (ref 3.8–10.5)
WBC # FLD AUTO: 4 K/UL — SIGNIFICANT CHANGE UP (ref 3.8–10.5)

## 2019-04-08 PROCEDURE — 93010 ELECTROCARDIOGRAM REPORT: CPT

## 2019-04-08 PROCEDURE — 99233 SBSQ HOSP IP/OBS HIGH 50: CPT

## 2019-04-08 PROCEDURE — 71045 X-RAY EXAM CHEST 1 VIEW: CPT | Mod: 26

## 2019-04-08 RX ADMIN — Medication 100 MILLIGRAM(S): at 21:50

## 2019-04-08 RX ADMIN — Medication 4 MILLIGRAM(S): at 21:50

## 2019-04-08 RX ADMIN — Medication 3 MILLIGRAM(S): at 21:50

## 2019-04-08 RX ADMIN — ENOXAPARIN SODIUM 40 MILLIGRAM(S): 100 INJECTION SUBCUTANEOUS at 11:26

## 2019-04-08 RX ADMIN — CEFTRIAXONE 100 GRAM(S): 500 INJECTION, POWDER, FOR SOLUTION INTRAMUSCULAR; INTRAVENOUS at 18:24

## 2019-04-08 RX ADMIN — Medication 100 MILLIGRAM(S): at 05:24

## 2019-04-08 RX ADMIN — Medication 1 LITER(S): at 00:07

## 2019-04-08 RX ADMIN — Medication 100 MILLIGRAM(S): at 11:26

## 2019-04-08 RX ADMIN — DEXTROSE MONOHYDRATE, SODIUM CHLORIDE, AND POTASSIUM CHLORIDE 100 MILLILITER(S): 50; .745; 4.5 INJECTION, SOLUTION INTRAVENOUS at 11:25

## 2019-04-08 RX ADMIN — PANTOPRAZOLE SODIUM 40 MILLIGRAM(S): 20 TABLET, DELAYED RELEASE ORAL at 11:26

## 2019-04-08 NOTE — DIETITIAN INITIAL EVALUATION ADULT. - PERTINENT MEDS FT
MEDICATIONS  (STANDING):  acetaminophen  IVPB .. 1000 milliGRAM(s) IV Intermittent once  cefTRIAXone   IVPB      cefTRIAXone   IVPB 1 Gram(s) IV Intermittent every 24 hours  dextrose 5% + sodium chloride 0.45% with potassium chloride 20 mEq/L 1000 milliLiter(s) (100 mL/Hr) IV Continuous <Continuous>  doxazosin 4 milliGRAM(s) Oral at bedtime  enoxaparin Injectable 40 milliGRAM(s) SubCutaneous daily  melatonin 3 milliGRAM(s) Oral at bedtime  pantoprazole  Injectable 40 milliGRAM(s) IV Push daily  phenazopyridine 100 milliGRAM(s) Oral every 8 hours    MEDICATIONS  (PRN):

## 2019-04-08 NOTE — PROGRESS NOTE ADULT - SUBJECTIVE AND OBJECTIVE BOX
EMILIO MULLER 73y [id number]    Patient is a 73y old  Male who presents with a chief complaint of uti incomplete emptying (2019 10:32)      Follow Up/CC:  ID following for bacteremia    Interval History/ROS: no fever, feels ok, planning possible OR tomorrow     Allergies    No Known Allergies    Intolerances        ANTIMICROBIALS:  cefTRIAXone   IVPB    cefTRIAXone   IVPB 1 every 24 hours      MEDICATIONS  (STANDING):  acetaminophen  IVPB .. 1000 milliGRAM(s) IV Intermittent once  cefTRIAXone   IVPB      cefTRIAXone   IVPB 1 Gram(s) IV Intermittent every 24 hours  dextrose 5% + sodium chloride 0.45% with potassium chloride 20 mEq/L 1000 milliLiter(s) (100 mL/Hr) IV Continuous <Continuous>  doxazosin 4 milliGRAM(s) Oral at bedtime  enoxaparin Injectable 40 milliGRAM(s) SubCutaneous daily  melatonin 3 milliGRAM(s) Oral at bedtime  pantoprazole  Injectable 40 milliGRAM(s) IV Push daily  phenazopyridine 100 milliGRAM(s) Oral every 8 hours    MEDICATIONS  (PRN):        Vital Signs Last 24 Hrs  T(C): 36.8 (2019 13:21), Max: 36.8 (2019 16:58)  T(F): 98.2 (2019 13:21), Max: 98.2 (2019 16:58)  HR: 85 (2019 13:21) (80 - 96)  BP: 135/74 (2019 13:21) (110/61 - 172/91)  BP(mean): --  RR: 18 (2019 13:21) (18 - 18)  SpO2: 100% (2019 13:21) (96% - 100%)    CBC Full  -  ( 2019 07:15 )  WBC Count : 4.0 K/uL  RBC Count : 4.10 M/uL  Hemoglobin : 7.7 g/dL  Hematocrit : 26.3 %  Platelet Count - Automated : 238 K/uL  Mean Cell Volume : 64.1 fl  Mean Cell Hemoglobin : 18.9 pg  Mean Cell Hemoglobin Concentration : 29.5 gm/dL  Auto Neutrophil # : x  Auto Lymphocyte # : x  Auto Monocyte # : x  Auto Eosinophil # : x  Auto Basophil # : x  Auto Neutrophil % : x  Auto Lymphocyte % : x  Auto Monocyte % : x  Auto Eosinophil % : x  Auto Basophil % : x    04-08    139  |  105  |  5<L>  ----------------------------<  122<H>  3.6   |  22  |  0.71    Ca    8.5      2019 07:15  Phos  2.5       Mg     2.1             Urinalysis Basic - ( 2019 17:03 )    Color: Dark Yellow / Appearance: Clear / S.009 / pH: x  Gluc: x / Ketone: Negative  / Bili: Negative / Urobili: Negative   Blood: x / Protein: Negative / Nitrite: Positive   Leuk Esterase: Moderate / RBC: 2 /hpf / WBC 17 /HPF   Sq Epi: x / Non Sq Epi: 0 /hpf / Bacteria: Negative        MICROBIOLOGY:  .Blood Blood  19   No growth to date.  --  --      .Blood Blood-Peripheral  19   Growth in aerobic bottle: Escherichia coli  "Due to technical problems, Proteus sp. will Not be reported as part of  the BCID panel until further notice"  ***Blood Panel PCR results on this specimen are available  approximately 3 hours after the Gram stain result.***  Gram stain, PCR, and/or culture results may not always  correspond due to difference in methodologies.  ************************************************************  This PCR assay was performed using [employer].  The following targets are tested for: Enterococcus,  vancomycin resistant enterococci, Listeria monocytogenes,  coagulase negative staphylococci, S. aureus,  methicillin resistant S. aureus, Streptococcus agalactiae  (Group B), S. pneumoniae, S. pyogenes (Group A),  Acinetobacter baumannii, Enterobacter cloacae, E. coli,  Klebsiella oxytoca, K. pneumoniae, Proteus sp.,  Serratia marcescens, Haemophilus influenzae,  Neisseria meningitidis, Pseudomonas aeruginosa, Candida  albicans, C. glabrata, C krusei, C parapsilosis,  C. tropicalis and the KPC resistance gene.  --  Blood Culture PCR  Escherichia coli      .Urine Clean Catch (Midstream)  19   >100,000 CFU/ml Escherichia coli  --  Escherichia coli      .Blood Blood-Peripheral  19   No growth at 5 days.  --  --      .Urine Clean Catch (Midstream)  19   <10,000 CFU/mL Normal Urogenital Amalia  --  --        RADIOLOGY    Xray Chest 1 View AP/PA (19 @ 06:56) >  Clear lungs.

## 2019-04-08 NOTE — DIETITIAN INITIAL EVALUATION ADULT. - ENERGY NEEDS
HT 67 inches,  pounds,  pounds, BMI 21.1,  pounds.  Dxd- acute abdominal pain concerning for small bowel obstruction secondary to newly imaged 5 cm cecal mass now with fevers and tachycardia 2/2 UTI/bacteremia   Skin intact, well nourished.

## 2019-04-08 NOTE — PROGRESS NOTE ADULT - ATTENDING COMMENTS
I saw and examined the pt and discussed the tx plan with the House Staff. I agree with the exam and plan as documented in the surgery resident's note from today.  Will obtain CT cystogram.  For colonoscopy and colectomy tomorrow.  Sarah Painter MD

## 2019-04-08 NOTE — PROGRESS NOTE ADULT - ATTENDING COMMENTS
Jude Kingsley  Attending Physician   Division of Infectious Disease  Pager #[phone]  After 5pm/weekend or no response, call #[phone]

## 2019-04-08 NOTE — CHART NOTE - NSCHARTNOTEFT_GEN_A_CORE
Preop Dx: Cecal mass likely causing SBO symptoms  Surgeon: Esther/Manny/Tee/Karine/Perlita  Procedure: Colonoscopy 4/9 morning, laparoscopic R hemicolectomy, possible open 4/9 afternoon    Vital Signs Last 24 Hrs  T(C): 36.7 (08 Apr 2019 05:17), Max: 36.9 (07 Apr 2019 09:29)  T(F): 98.1 (08 Apr 2019 05:17), Max: 98.4 (07 Apr 2019 09:29)  HR: 88 (08 Apr 2019 05:17) (80 - 99)  BP: 157/81 (08 Apr 2019 05:17) (110/61 - 172/91)  BP(mean): --  RR: 18 (08 Apr 2019 05:17) (16 - 18)  SpO2: 98% (08 Apr 2019 05:17) (96% - 100%)                        8.4    4.2   )-----------( 221      ( 07 Apr 2019 06:38 )             29.7     04-07    139  |  104  |  4<L>  ----------------------------<  132<H>  3.6   |  23  |  0.67    Ca    8.6      07 Apr 2019 06:37  Phos  2.0     04-07  Mg     2.1     04-07        Daily     Daily     EKG:   CXR:   Type and Screen:         A/P: 73y Male     - OR xx/xx/xx for _____________ with  _____  - NPO past midnight, except medications  - IVF while NPO  - Consent signed and in chart  - Medical clearance for OR Preop Dx: Cecal mass likely causing SBO symptoms  Surgeon: Esther/Manny/Tee/Karine/Perlita  Procedure: Colonoscopy 4/9 morning, laparoscopic R hemicolectomy, possible open 4/9 afternoon    Vital Signs Last 24 Hrs  T(C): 36.7 (08 Apr 2019 05:17), Max: 36.9 (07 Apr 2019 09:29)  T(F): 98.1 (08 Apr 2019 05:17), Max: 98.4 (07 Apr 2019 09:29)  HR: 88 (08 Apr 2019 05:17) (80 - 99)  BP: 157/81 (08 Apr 2019 05:17) (110/61 - 172/91)  BP(mean): --  RR: 18 (08 Apr 2019 05:17) (16 - 18)  SpO2: 98% (08 Apr 2019 05:17) (96% - 100%)                        8.4    4.2   )-----------( 221      ( 07 Apr 2019 06:38 )             29.7     04-07    139  |  104  |  4<L>  ----------------------------<  132<H>  3.6   |  23  |  0.67    Ca    8.6      07 Apr 2019 06:37  Phos  2.0     04-07  Mg     2.1     04-07        Daily     Daily     EKG: ordered. To be placed in chart.   CXR: ordered. Last on 4/3: clear.  Type and Screen: ordered for AM. Prior TS obtained 4/3.      A/P: 73M w/ no PMHx presents with acute abdominal pain concerning for small bowel obstruction secondary to newly imaged 5 cm cecal mass now with fevers and tachycardia 2/2 UTI/bacteremia     - OR 4/9/19 for colonoscopy follow by R hemicolectomy, possible open with Dr. Santana/Manny/Tee/Karine/Perlita   - NPO past midnight, except medications  - IVF while NPO  - Consent to be signed and placed in chart (not obtained as of 4/8 6:38 AM)  - Medical clearance for OR to be confirmed by hospitalist and ID Preop Dx: Cecal mass likely causing SBO symptoms  Surgeon: Esther/Manny/Tee/Karine/Perlita  Procedure: Colonoscopy 4/9 morning, laparoscopic R hemicolectomy, possible open 4/9 afternoon    Vital Signs Last 24 Hrs  T(C): 36.7 (08 Apr 2019 05:17), Max: 36.9 (07 Apr 2019 09:29)  T(F): 98.1 (08 Apr 2019 05:17), Max: 98.4 (07 Apr 2019 09:29)  HR: 88 (08 Apr 2019 05:17) (80 - 99)  BP: 157/81 (08 Apr 2019 05:17) (110/61 - 172/91)  BP(mean): --  RR: 18 (08 Apr 2019 05:17) (16 - 18)  SpO2: 98% (08 Apr 2019 05:17) (96% - 100%)                        8.4    4.2   )-----------( 221      ( 07 Apr 2019 06:38 )             29.7     04-07    139  |  104  |  4<L>  ----------------------------<  132<H>  3.6   |  23  |  0.67    Ca    8.6      07 Apr 2019 06:37  Phos  2.0     04-07  Mg     2.1     04-07        Daily     Daily     EKG: ordered. To be placed in chart.   CXR: ordered. Last on 4/3: clear.  Type and Screen: ordered for AM. Prior TS obtained 4/3.      A/P: 73M w/ no PMHx presents with acute abdominal pain concerning for small bowel obstruction secondary to newly imaged 5 cm cecal mass now with fevers and tachycardia 2/2 UTI/bacteremia     - OR 4/9/19 for colonoscopy follow by R hemicolectomy, possible open with Dr. Santana/Manny/Tee/Karine/Perlita   - NPO past midnight, except medications  - IVF while NPO  - Consent signed and placed in "consent" section of chart (obtained 4/8 at 8:50AM)  - Medical clearance for OR to be confirmed by hospitalist and ID

## 2019-04-08 NOTE — PROGRESS NOTE ADULT - SUBJECTIVE AND OBJECTIVE BOX
CHIEF COMPLAINT:Patient is a 73y old  Male who presents with a chief complaint of uti incomplete emptying (07 Apr 2019 10:32)    	        PAST MEDICAL & SURGICAL HISTORY:  No pertinent past medical history  No significant past surgical history          REVIEW OF SYSTEMS:  CONSTITUTIONAL:   feels much better   EYES: No eye pain, visual disturbances, or discharge  NECK: No pain or stiffness  RESPIRATORY: No cough, wheezing, chills or hemoptysis; No Shortness of Breath  CARDIOVASCULAR: No chest pain, palpitations, passing out, dizziness, or leg swelling  GASTROINTESTINAL: less abd pain   GENITOURINARY: No dysuria, frequency, hematuria, or incontinence  NEUROLOGICAL: No headaches, memory loss, loss of strength, numbness, or tremors      Medications:  MEDICATIONS  (STANDING):  acetaminophen  IVPB .. 1000 milliGRAM(s) IV Intermittent once  cefTRIAXone   IVPB      cefTRIAXone   IVPB 1 Gram(s) IV Intermittent every 24 hours  dextrose 5% + sodium chloride 0.45% with potassium chloride 20 mEq/L 1000 milliLiter(s) (100 mL/Hr) IV Continuous <Continuous>  doxazosin 4 milliGRAM(s) Oral at bedtime  enoxaparin Injectable 40 milliGRAM(s) SubCutaneous daily  melatonin 3 milliGRAM(s) Oral at bedtime  pantoprazole  Injectable 40 milliGRAM(s) IV Push daily  phenazopyridine 100 milliGRAM(s) Oral every 8 hours    MEDICATIONS  (PRN):    	    PHYSICAL EXAM:  T(C): 36.7 (04-08-19 @ 05:17), Max: 36.9 (04-07-19 @ 09:29)  HR: 88 (04-08-19 @ 05:17) (80 - 99)  BP: 157/81 (04-08-19 @ 05:17) (110/61 - 172/91)  RR: 18 (04-08-19 @ 05:17) (16 - 18)  SpO2: 98% (04-08-19 @ 05:17) (96% - 100%)  Wt(kg): --  I&O's Summary    07 Apr 2019 07:01  -  08 Apr 2019 07:00  --------------------------------------------------------  IN: 5520 mL / OUT: 1350 mL / NET: 4170 mL        Appearance: Normal	  HEENT:   Normal oral mucosa, PERRL, EOMI	  Lymphatic: No lymphadenopathy  Cardiovascular: Normal S1 S2, No JVD, No murmurs, No edema  Respiratory: Lungs clear to auscultation	  Psychiatry: A & O x 3, Mood & affect appropriate  Gastrointestinal:  Soft, Non-tender, + BS	  Skin: No rashes, No ecchymoses, No cyanosis	  Neurologic: Non-focal  Extremities: Normal range of motion, No clubbing, cyanosis or edema  Vascular: Peripheral pulses palpable 2+ bilaterally    TELEMETRY: 	    ECG:  	  RADIOLOGY:  OTHER: 	  	  LABS:	 	    CARDIAC MARKERS:                                7.7    4.0   )-----------( 238      ( 08 Apr 2019 07:15 )             26.3     04-08    139  |  105  |  5<L>  ----------------------------<  122<H>  3.6   |  22  |  0.71    Ca    8.5      08 Apr 2019 07:15  Phos  2.5     04-08  Mg     2.1     04-08      proBNP:   Lipid Profile:   HgA1c:   TSH:

## 2019-04-08 NOTE — PROGRESS NOTE ADULT - SUBJECTIVE AND OBJECTIVE BOX
GENERAL SURGERY DAILY PROGRESS NOTE:       Subjective:  Pt seen and examined at bedside. . (-)N/V, (+) flatus/BM. Yesterday, pt c/o severe burning and frequency of urination overnight. UCx sensitivities returned and abx switched from zosyn to ceftriaxone    Objective:    PE:  General: NAD, Lying in bed comfortably, alert, oriented x3  Pulm: Non-labored breathing on RA  GI/Abd: Soft, NT/ND, no rebound/guarding    Vital Signs Last 24 Hrs  T(C): 36.8 (07 Apr 2019 16:58), Max: 36.9 (07 Apr 2019 09:29)  T(F): 98.2 (07 Apr 2019 16:58), Max: 98.4 (07 Apr 2019 09:29)  HR: 80 (07 Apr 2019 18:52) (70 - 99)  BP: 158/87 (07 Apr 2019 18:52) (155/73 - 174/85)  BP(mean): --  RR: 18 (07 Apr 2019 16:58) (16 - 18)  SpO2: 100% (07 Apr 2019 16:58) (97% - 100%)    04-06-19 @ 07:01  -  04-07-19 @ 07:00  --------------------------------------------------------  IN: 4240 mL / OUT: 650 mL / NET: 3590 mL    04-07-19 @ 07:01  - 04-07-19 @ 20:39  --------------------------------------------------------  IN: 2060 mL / OUT: 750 mL / NET: 1310 mL      MEDICATIONS  (STANDING):  acetaminophen  IVPB .. 1000 milliGRAM(s) IV Intermittent once  cefTRIAXone   IVPB      cefTRIAXone   IVPB 1 Gram(s) IV Intermittent every 24 hours  dextrose 5% + sodium chloride 0.45% with potassium chloride 20 mEq/L 1000 milliLiter(s) (100 mL/Hr) IV Continuous <Continuous>  doxazosin 4 milliGRAM(s) Oral at bedtime  enoxaparin Injectable 40 milliGRAM(s) SubCutaneous daily  melatonin 3 milliGRAM(s) Oral at bedtime  pantoprazole  Injectable 40 milliGRAM(s) IV Push daily  phenazopyridine 100 milliGRAM(s) Oral every 8 hours    MEDICATIONS  (PRN):      LABS:                        8.4    4.2   )-----------( 221      ( 07 Apr 2019 06:38 )             29.7     04-07    139  |  104  |  4<L>  ----------------------------<  132<H>  3.6   |  23  |  0.67    Ca    8.6      07 Apr 2019 06:37  Phos  2.0     04-07  Mg     2.1     04-07

## 2019-04-08 NOTE — PROGRESS NOTE ADULT - ASSESSMENT
73M w/ no PMHx presents with acute abdominal pain concerning for small bowel obstruction secondary to newly imaged 5 cm cecal mass now with fevers and tachycardia 2/2 UTI/bacteremia      - CLD w/ensure and MIVF  - Moviprep yesterday (4/7) and today (4/8)  - C-scope 4/9 morning, surgery 4/9 afternoon  - Ceftriaxone for UTI  - Strict Is and Os  - Appreciate Hospitalist recs  - F/u urology consult    - recommending CT cystogram   - No home medications  - Lovenox for DVT prophylaxis    [facility] Pager [phone]

## 2019-04-08 NOTE — PROGRESS NOTE ADULT - ASSESSMENT
pt is a 74 y/o male  w/ no PMHx presents with acute abdominal pain w/ small bowel obstruction secondary to new cecal mass now with fevers and tachycardia 2/2 to david UTI    +bc gram neg rods bacteremia  repeat cultures neg  from 4/5   id eval noted  cont abs   bph   flomax   uro cysto as per urology   fluid resuscitation prn  - Surgery  tomorrow for colonoscopy / colectomy    analgesics prn   -f/u labs noted  supp k+ and phos prn  leukocytosis  resolved  anemia  transfuse prn   likely multifactorial   tylenol prn   - Lovenox for DVT prophylaxis

## 2019-04-08 NOTE — DIETITIAN INITIAL EVALUATION ADULT. - OTHER INFO
Patient seen for routine length of stay assessment.  Patient found sitting in chair, sipping some water.  Patient reports that he was eating OK at home but not allowed now due to SBO and awaiting surgery tomorrow.  Patient denies any N/V.  Intake fair, not hungry as he is undergoing bowel prep.  Does not care for Ensure Clear but reinforced benefit of protein and muscle.

## 2019-04-09 ENCOUNTER — RESULT REVIEW (OUTPATIENT)
Age: 74
End: 2019-04-09

## 2019-04-09 ENCOUNTER — APPOINTMENT (OUTPATIENT)
Dept: SURGERY | Facility: HOSPITAL | Age: 74
End: 2019-04-09
Payer: MEDICARE

## 2019-04-09 LAB
ANION GAP SERPL CALC-SCNC: 11 MMOL/L — SIGNIFICANT CHANGE UP (ref 5–17)
APTT BLD: 31.2 SEC — SIGNIFICANT CHANGE UP (ref 27.5–36.3)
BLD GP AB SCN SERPL QL: NEGATIVE — SIGNIFICANT CHANGE UP
BUN SERPL-MCNC: 4 MG/DL — LOW (ref 7–23)
CALCIUM SERPL-MCNC: 8.8 MG/DL — SIGNIFICANT CHANGE UP (ref 8.4–10.5)
CHLORIDE SERPL-SCNC: 103 MMOL/L — SIGNIFICANT CHANGE UP (ref 96–108)
CO2 SERPL-SCNC: 25 MMOL/L — SIGNIFICANT CHANGE UP (ref 22–31)
CREAT SERPL-MCNC: 0.8 MG/DL — SIGNIFICANT CHANGE UP (ref 0.5–1.3)
CULTURE RESULTS: NO GROWTH — SIGNIFICANT CHANGE UP
GLUCOSE SERPL-MCNC: 126 MG/DL — HIGH (ref 70–99)
HCT VFR BLD CALC: 27.1 % — LOW (ref 39–50)
HGB BLD-MCNC: 7.8 G/DL — LOW (ref 13–17)
INR BLD: 1.01 RATIO — SIGNIFICANT CHANGE UP (ref 0.88–1.16)
MAGNESIUM SERPL-MCNC: 2.2 MG/DL — SIGNIFICANT CHANGE UP (ref 1.6–2.6)
MCHC RBC-ENTMCNC: 18.4 PG — LOW (ref 27–34)
MCHC RBC-ENTMCNC: 28.7 GM/DL — LOW (ref 32–36)
MCV RBC AUTO: 64 FL — LOW (ref 80–100)
PHOSPHATE SERPL-MCNC: 2.9 MG/DL — SIGNIFICANT CHANGE UP (ref 2.5–4.5)
PLATELET # BLD AUTO: 263 K/UL — SIGNIFICANT CHANGE UP (ref 150–400)
POTASSIUM SERPL-MCNC: 3.9 MMOL/L — SIGNIFICANT CHANGE UP (ref 3.5–5.3)
POTASSIUM SERPL-SCNC: 3.9 MMOL/L — SIGNIFICANT CHANGE UP (ref 3.5–5.3)
PROTHROM AB SERPL-ACNC: 11.6 SEC — SIGNIFICANT CHANGE UP (ref 10–12.9)
RBC # BLD: 4.24 M/UL — SIGNIFICANT CHANGE UP (ref 4.2–5.8)
RBC # FLD: 16.2 % — HIGH (ref 10.3–14.5)
RH IG SCN BLD-IMP: POSITIVE — SIGNIFICANT CHANGE UP
SODIUM SERPL-SCNC: 139 MMOL/L — SIGNIFICANT CHANGE UP (ref 135–145)
SPECIMEN SOURCE: SIGNIFICANT CHANGE UP
WBC # BLD: 3.6 K/UL — LOW (ref 3.8–10.5)
WBC # FLD AUTO: 3.6 K/UL — LOW (ref 3.8–10.5)

## 2019-04-09 PROCEDURE — 45385 COLONOSCOPY W/LESION REMOVAL: CPT

## 2019-04-09 PROCEDURE — 88342 IMHCHEM/IMCYTCHM 1ST ANTB: CPT | Mod: 26

## 2019-04-09 PROCEDURE — 45380 COLONOSCOPY AND BIOPSY: CPT | Mod: 59

## 2019-04-09 PROCEDURE — 88341 IMHCHEM/IMCYTCHM EA ADD ANTB: CPT | Mod: 26

## 2019-04-09 PROCEDURE — 44204 LAPARO PARTIAL COLECTOMY: CPT

## 2019-04-09 PROCEDURE — 99232 SBSQ HOSP IP/OBS MODERATE 35: CPT

## 2019-04-09 PROCEDURE — 88309 TISSUE EXAM BY PATHOLOGIST: CPT | Mod: 26

## 2019-04-09 PROCEDURE — 88305 TISSUE EXAM BY PATHOLOGIST: CPT | Mod: 26

## 2019-04-09 RX ORDER — SODIUM CHLORIDE 9 MG/ML
1000 INJECTION, SOLUTION INTRAVENOUS
Qty: 0 | Refills: 0 | Status: DISCONTINUED | OUTPATIENT
Start: 2019-04-09 | End: 2019-04-09

## 2019-04-09 RX ORDER — ONDANSETRON 8 MG/1
4 TABLET, FILM COATED ORAL EVERY 6 HOURS
Qty: 0 | Refills: 0 | Status: DISCONTINUED | OUTPATIENT
Start: 2019-04-09 | End: 2019-04-09

## 2019-04-09 RX ORDER — LANOLIN ALCOHOL/MO/W.PET/CERES
3 CREAM (GRAM) TOPICAL AT BEDTIME
Qty: 0 | Refills: 0 | Status: DISCONTINUED | OUTPATIENT
Start: 2019-04-09 | End: 2019-04-12

## 2019-04-09 RX ORDER — DOXAZOSIN MESYLATE 4 MG
4 TABLET ORAL AT BEDTIME
Qty: 0 | Refills: 0 | Status: DISCONTINUED | OUTPATIENT
Start: 2019-04-09 | End: 2019-04-12

## 2019-04-09 RX ORDER — PHENAZOPYRIDINE HCL 100 MG
100 TABLET ORAL EVERY 8 HOURS
Qty: 0 | Refills: 0 | Status: DISCONTINUED | OUTPATIENT
Start: 2019-04-09 | End: 2019-04-12

## 2019-04-09 RX ORDER — ACETAMINOPHEN 500 MG
1000 TABLET ORAL ONCE
Qty: 0 | Refills: 0 | Status: COMPLETED | OUTPATIENT
Start: 2019-04-10 | End: 2019-04-10

## 2019-04-09 RX ORDER — SODIUM CHLORIDE 9 MG/ML
1000 INJECTION, SOLUTION INTRAVENOUS
Qty: 0 | Refills: 0 | Status: DISCONTINUED | OUTPATIENT
Start: 2019-04-09 | End: 2019-04-11

## 2019-04-09 RX ORDER — OXYCODONE HYDROCHLORIDE 5 MG/1
5 TABLET ORAL
Qty: 0 | Refills: 0 | Status: DISCONTINUED | OUTPATIENT
Start: 2019-04-09 | End: 2019-04-09

## 2019-04-09 RX ORDER — NALOXONE HYDROCHLORIDE 4 MG/.1ML
0.1 SPRAY NASAL
Qty: 0 | Refills: 0 | Status: DISCONTINUED | OUTPATIENT
Start: 2019-04-09 | End: 2019-04-09

## 2019-04-09 RX ORDER — PANTOPRAZOLE SODIUM 20 MG/1
40 TABLET, DELAYED RELEASE ORAL DAILY
Qty: 0 | Refills: 0 | Status: DISCONTINUED | OUTPATIENT
Start: 2019-04-09 | End: 2019-04-11

## 2019-04-09 RX ORDER — ENOXAPARIN SODIUM 100 MG/ML
40 INJECTION SUBCUTANEOUS DAILY
Qty: 0 | Refills: 0 | Status: DISCONTINUED | OUTPATIENT
Start: 2019-04-09 | End: 2019-04-12

## 2019-04-09 RX ORDER — ONDANSETRON 8 MG/1
4 TABLET, FILM COATED ORAL ONCE
Qty: 0 | Refills: 0 | Status: DISCONTINUED | OUTPATIENT
Start: 2019-04-09 | End: 2019-04-09

## 2019-04-09 RX ORDER — HYDROMORPHONE HYDROCHLORIDE 2 MG/ML
0.25 INJECTION INTRAMUSCULAR; INTRAVENOUS; SUBCUTANEOUS
Qty: 0 | Refills: 0 | Status: DISCONTINUED | OUTPATIENT
Start: 2019-04-09 | End: 2019-04-09

## 2019-04-09 RX ORDER — OXYCODONE HYDROCHLORIDE 5 MG/1
10 TABLET ORAL
Qty: 0 | Refills: 0 | Status: DISCONTINUED | OUTPATIENT
Start: 2019-04-09 | End: 2019-04-09

## 2019-04-09 RX ORDER — CEFTRIAXONE 500 MG/1
1 INJECTION, POWDER, FOR SOLUTION INTRAMUSCULAR; INTRAVENOUS EVERY 24 HOURS
Qty: 0 | Refills: 0 | Status: DISCONTINUED | OUTPATIENT
Start: 2019-04-09 | End: 2019-04-12

## 2019-04-09 RX ORDER — MORPHINE SULFATE 50 MG/1
0.2 CAPSULE, EXTENDED RELEASE ORAL ONCE
Qty: 0 | Refills: 0 | Status: DISCONTINUED | OUTPATIENT
Start: 2019-04-09 | End: 2019-04-10

## 2019-04-09 RX ADMIN — PANTOPRAZOLE SODIUM 40 MILLIGRAM(S): 20 TABLET, DELAYED RELEASE ORAL at 12:35

## 2019-04-09 RX ADMIN — Medication 100 MILLIGRAM(S): at 05:21

## 2019-04-09 RX ADMIN — Medication 4 MILLIGRAM(S): at 22:19

## 2019-04-09 RX ADMIN — CEFTRIAXONE 100 GRAM(S): 500 INJECTION, POWDER, FOR SOLUTION INTRAMUSCULAR; INTRAVENOUS at 22:18

## 2019-04-09 NOTE — PROGRESS NOTE ADULT - ATTENDING COMMENTS
Jude Kingsley  Attending Physician   Division of Infectious Disease  Pager #[phone]  After 5pm/weekend or no response, call #[phone]

## 2019-04-09 NOTE — PROGRESS NOTE ADULT - SUBJECTIVE AND OBJECTIVE BOX
Pre-Endoscopy Evaluation      Referring Physician: dr. chris mohamud                                 Procedure: colonoscopy    Indication for Procedure: cecal mass, pre-op Right Hemicolectomy    Pertinent History:  73y male with Cecal mass     Sedation by Anesthesia [X]    PAST MEDICAL & SURGICAL HISTORY:  No pertinent past medical history  No significant past surgical history      PMH of Gastroparesis [ ]  Gastric Surgery [ ]  Gastric Outlet Obstruction [ ]    Allergies:    No Known Allergies    Intolerances:    Latex allergy: [ ] yes [X] no    Medications:MEDICATIONS  (STANDING):  acetaminophen  IVPB .. 1000 milliGRAM(s) IV Intermittent once  cefTRIAXone   IVPB      cefTRIAXone   IVPB 1 Gram(s) IV Intermittent every 24 hours  dextrose 5% + sodium chloride 0.45% with potassium chloride 20 mEq/L 1000 milliLiter(s) (100 mL/Hr) IV Continuous <Continuous>  doxazosin 4 milliGRAM(s) Oral at bedtime  enoxaparin Injectable 40 milliGRAM(s) SubCutaneous daily  melatonin 3 milliGRAM(s) Oral at bedtime  pantoprazole  Injectable 40 milliGRAM(s) IV Push daily  phenazopyridine 100 milliGRAM(s) Oral every 8 hours    MEDICATIONS  (PRN):      Smoking: [ ] yes  [X] no    AICD/PPM: [ ] yes   [X] no    Pertinent lab data:                        7.8    3.6   )-----------( 263      ( 2019 07:01 )             27.1     04-09    139  |  103  |  4<L>  ----------------------------<  126<H>  3.9   |  25  |  0.80    Ca    8.8      2019 06:58  Phos  2.9     04-09  Mg     2.2     04-09      PT/INR - ( 2019 07:00 )   PT: 11.6 sec;   INR: 1.01 ratio       PTT - ( 2019 07:00 )  PTT:31.2 sec        Physical Examination:    Daily Weight in k.5 (2019 10:43)  Vital Signs Last 24 Hrs  T(C): 36.4 (2019 08:58), Max: 36.8 (2019 13:21)  T(F): 97.5 (2019 08:58), Max: 98.2 (2019 13:21)  HR: 95 (2019 08:58) (63 - 95)  BP: 150/76 (2019 08:58) (118/65 - 157/74)  BP(mean): --  RR: 18 (2019 08:58) (18 - 18)  SpO2: 99% (2019 08:58) (98% - 100%)    Drug Dosing Weight  Height (cm): 170.18 (2019 06:32)  Weight (kg): 61.2 (2019 06:32)  BMI (kg/m2): 21.1 (2019 06:32)  BSA (m2): 1.71 (2019 06:32)    Constitutional: NAD     Neck:  No JVD    Respiratory: CTAB/L    Cardiovascular: S1 and S2    Gastrointestinal: BS+, soft, NT/ND    Extremities: No peripheral edema    Neurological: A/O x 3    : No [place]    Skin: No rashes    Comments:    ASA Class: I [ ]  II [x]  III [ ]  IV [ ]    The patient is a suitable candidate for the planned procedure unless box checked [ ]  No, explain:

## 2019-04-09 NOTE — PROGRESS NOTE ADULT - SUBJECTIVE AND OBJECTIVE BOX
CHIEF COMPLAINT:Patient is a 73y old  Male who presents with a chief complaint of uti incomplete emptying (07 Apr 2019 10:32)    	        PAST MEDICAL & SURGICAL HISTORY:  No pertinent past medical history  No significant past surgical history          REVIEW OF SYSTEMS:  CONSTITUTIONAL: No fever, weight loss, or fatigue  EYES: No eye pain, visual disturbances, or discharge  NECK: No pain or stiffness  RESPIRATORY: No cough, wheezing, chills or hemoptysis; No Shortness of Breath  CARDIOVASCULAR: No chest pain, palpitations, passing out, dizziness, or leg swelling  GASTROINTESTINAL: less abd pain   GENITOURINARY: No dysuria, frequency, hematuria, or incontinence  NEUROLOGICAL: No headaches, memory loss, loss of strength, numbness, or tremors  SKIN: No itching, burning, rashes, or lesions   LYMPH Nodes: No enlarged glands  ENDOCRINE: No heat or cold intolerance; No hair loss  MUSCULOSKELETAL: No joint pain or swelling; No muscle, back, or extremity pain    Medications:  MEDICATIONS  (STANDING):  acetaminophen  IVPB .. 1000 milliGRAM(s) IV Intermittent once  cefTRIAXone   IVPB      cefTRIAXone   IVPB 1 Gram(s) IV Intermittent every 24 hours  dextrose 5% + sodium chloride 0.45% with potassium chloride 20 mEq/L 1000 milliLiter(s) (100 mL/Hr) IV Continuous <Continuous>  doxazosin 4 milliGRAM(s) Oral at bedtime  enoxaparin Injectable 40 milliGRAM(s) SubCutaneous daily  melatonin 3 milliGRAM(s) Oral at bedtime  pantoprazole  Injectable 40 milliGRAM(s) IV Push daily  phenazopyridine 100 milliGRAM(s) Oral every 8 hours    MEDICATIONS  (PRN):    	    PHYSICAL EXAM:  T(C): 36.4 (04-09-19 @ 08:58), Max: 36.8 (04-08-19 @ 13:21)  HR: 95 (04-09-19 @ 08:58) (63 - 95)  BP: 150/76 (04-09-19 @ 08:58) (118/65 - 157/74)  RR: 18 (04-09-19 @ 08:58) (18 - 18)  SpO2: 99% (04-09-19 @ 08:58) (98% - 100%)  Wt(kg): --  I&O's Summary    08 Apr 2019 07:01  -  09 Apr 2019 07:00  --------------------------------------------------------  IN: 3070 mL / OUT: 1825 mL / NET: 1245 mL    09 Apr 2019 07:01  -  09 Apr 2019 11:00  --------------------------------------------------------  IN: 200 mL / OUT: 200 mL / NET: 0 mL        Appearance: Normal	  HEENT:   Normal oral mucosa, PERRL, EOMI	  Lymphatic: No lymphadenopathy  Cardiovascular: Normal S1 S2, No JVD, No murmurs, No edema  Respiratory: Lungs clear to auscultation	  Psychiatry: A & O x 3, Mood & affect appropriate  Gastrointestinal:  Soft, mild distention no r/g  Skin: No rashes, No ecchymoses, No cyanosis	  Neurologic: Non-focal  Extremities: Normal range of motion, No clubbing, cyanosis or edema  Vascular: Peripheral pulses palpable 2+ bilaterally    TELEMETRY: 	    ECG:  	  RADIOLOGY:  OTHER: 	  	  LABS:	 	    CARDIAC MARKERS:                                7.8    3.6   )-----------( 263      ( 09 Apr 2019 07:01 )             27.1     04-09    139  |  103  |  4<L>  ----------------------------<  126<H>  3.9   |  25  |  0.80    Ca    8.8      09 Apr 2019 06:58  Phos  2.9     04-09  Mg     2.2     04-09      proBNP:   Lipid Profile:   HgA1c:   TSH:

## 2019-04-09 NOTE — PROGRESS NOTE ADULT - ASSESSMENT
73M w/ no PMHx presents with acute abdominal pain concerning for small bowel obstruction secondary to newly imaged 5 cm cecal mass now with fevers and tachycardia 2/2 UTI/bacteremia      - NPO w/IVF  - Moviprep (4/7) and (4/8)  - C-scope 4/9 morning, surgery 4/9 afternoon  - f/u preop labs  - consent signed and placed in chart for (1) colonoscopy and (2) R hemicolectomy, possible open  - Ceftriaxone for UTI  - Strict Is and Os  - Appreciate hospitalist recs  - No home medications  - Lovenox for DVT prophylaxis    [facility] Pager [phone]

## 2019-04-09 NOTE — PROGRESS NOTE ADULT - PROBLEM SELECTOR PLAN 6
-per surgery  -ok from ID point of view for surgery if needed
-per surgery
-per surgery  -ok from ID point of view for surgery if needed
-per surgery

## 2019-04-09 NOTE — PROGRESS NOTE ADULT - SUBJECTIVE AND OBJECTIVE BOX
EMILIO MULLER 73y [id number]    Patient is a 73y old  Male who presents with a chief complaint of uti incomplete emptying (2019 10:32)      Follow Up/CC:  ID following for bacteremia    Interval History/ROS: feels ok, no fever, for OR today    Allergies    No Known Allergies    Intolerances        ANTIMICROBIALS:  cefTRIAXone   IVPB    cefTRIAXone   IVPB 1 every 24 hours      MEDICATIONS  (STANDING):  acetaminophen  IVPB .. 1000 milliGRAM(s) IV Intermittent once  cefTRIAXone   IVPB      cefTRIAXone   IVPB 1 Gram(s) IV Intermittent every 24 hours  dextrose 5% + sodium chloride 0.45% with potassium chloride 20 mEq/L 1000 milliLiter(s) (100 mL/Hr) IV Continuous <Continuous>  doxazosin 4 milliGRAM(s) Oral at bedtime  enoxaparin Injectable 40 milliGRAM(s) SubCutaneous daily  melatonin 3 milliGRAM(s) Oral at bedtime  pantoprazole  Injectable 40 milliGRAM(s) IV Push daily  phenazopyridine 100 milliGRAM(s) Oral every 8 hours    MEDICATIONS  (PRN):        Vital Signs Last 24 Hrs  T(C): 36.5 (2019 13:36), Max: 36.7 (2019 21:54)  T(F): 97.7 (2019 13:36), Max: 98.1 (2019 21:54)  HR: 78 (2019 13:36) (63 - 95)  BP: 153/57 (2019 13:36) (118/65 - 157/74)  BP(mean): --  RR: 18 (2019 13:36) (17 - 18)  SpO2: 100% (2019 13:36) (98% - 100%)    CBC Full  -  ( 2019 07:01 )  WBC Count : 3.6 K/uL  RBC Count : 4.24 M/uL  Hemoglobin : 7.8 g/dL  Hematocrit : 27.1 %  Platelet Count - Automated : 263 K/uL  Mean Cell Volume : 64.0 fl  Mean Cell Hemoglobin : 18.4 pg  Mean Cell Hemoglobin Concentration : 28.7 gm/dL  Auto Neutrophil # : x  Auto Lymphocyte # : x  Auto Monocyte # : x  Auto Eosinophil # : x  Auto Basophil # : x  Auto Neutrophil % : x  Auto Lymphocyte % : x  Auto Monocyte % : x  Auto Eosinophil % : x  Auto Basophil % : x    04-09    139  |  103  |  4<L>  ----------------------------<  126<H>  3.9   |  25  |  0.80    Ca    8.8      2019 06:58  Phos  2.9       Mg     2.2             Urinalysis Basic - ( 2019 17:03 )    Color: Dark Yellow / Appearance: Clear / S.009 / pH: x  Gluc: x / Ketone: Negative  / Bili: Negative / Urobili: Negative   Blood: x / Protein: Negative / Nitrite: Positive   Leuk Esterase: Moderate / RBC: 2 /hpf / WBC 17 /HPF   Sq Epi: x / Non Sq Epi: 0 /hpf / Bacteria: Negative        MICROBIOLOGY:  .Blood Blood  19   No growth to date.  --  --      .Blood Blood-Peripheral  19   Growth in aerobic bottle: Escherichia coli  "Due to technical problems, Proteus sp. will Not be reported as part of  the BCID panel until further notice"  ***Blood Panel PCR results on this specimen are available  approximately 3 hours after the Gram stain result.***  Gram stain, PCR, and/or culture results may not always  correspond due to difference in methodologies.  ************************************************************  This PCR assay was performed using [employer].  The following targets are tested for: Enterococcus,  vancomycin resistant enterococci, Listeria monocytogenes,  coagulase negative staphylococci, S. aureus,  methicillin resistant S. aureus, Streptococcus agalactiae  (Group B), S. pneumoniae, S. pyogenes (Group A),  Acinetobacter baumannii, Enterobacter cloacae, E. coli,  Klebsiella oxytoca, K. pneumoniae, Proteus sp.,  Serratia marcescens, Haemophilus influenzae,  Neisseria meningitidis, Pseudomonas aeruginosa, Candida  albicans, C. glabrata, C krusei, C parapsilosis,  C. tropicalis and the KPC resistance gene.  --  Blood Culture PCR  Escherichia coli      .Urine Clean Catch (Midstream)  19   >100,000 CFU/ml Escherichia coli  --  Escherichia coli      .Blood Blood-Peripheral  19   No growth at 5 days.  --  --      .Urine Clean Catch (Midstream)  19   <10,000 CFU/mL Normal Urogenital Amalia  --  --      RADIOLOGY    < from: Xray Chest 1 View AP/PA (19 @ 06:56) >  Clear lungs.    < end of copied text >

## 2019-04-09 NOTE — PROGRESS NOTE ADULT - ATTENDING COMMENTS
I saw and examined the pt and discussed the tx plan with the House Staff. I agree with the exam and plan as documented in the surgery resident's note from today.  H&P reviewed, agree, updates as documented in the daily progress notes.   Sarah Painter MD

## 2019-04-09 NOTE — CHART NOTE - NSCHARTNOTEFT_GEN_A_CORE
SURGICAL POST-OP CHECK NOTE:     Procedure: Lap right hemicolectomy. Primary ileocolic anastomosis using staplers.      Subjective:  Patient seen and examined at bedside. Patient w/o ambulaiton at present time. +ambulation. +voiding via mariscal.     Vital Signs Last 24 Hrs  T(C): 36.7 (09 Apr 2019 23:45), Max: 36.7 (09 Apr 2019 05:19)  T(F): 98 (09 Apr 2019 23:45), Max: 98.1 (09 Apr 2019 13:48)  HR: 90 (09 Apr 2019 23:45) (63 - 95)  BP: 104/61 (09 Apr 2019 23:45) (104/61 - 171/85)  BP(mean): 106 (09 Apr 2019 20:30) (92 - 107)  RR: 17 (09 Apr 2019 23:45) (14 - 18)  SpO2: 100% (09 Apr 2019 23:45) (98% - 100%)  I&O's Summary    08 Apr 2019 07:01  -  09 Apr 2019 07:00  --------------------------------------------------------  IN: 3070 mL / OUT: 1825 mL / NET: 1245 mL    09 Apr 2019 07:01  -  10 Apr 2019 00:13  --------------------------------------------------------  IN: 640 mL / OUT: 950 mL / NET: -310 mL                            7.8    3.6   )-----------( 263      ( 09 Apr 2019 07:01 )             27.1     04-09    139  |  103  |  4<L>  ----------------------------<  126<H>  3.9   |  25  |  0.80    Ca    8.8      09 Apr 2019 06:58  Phos  2.9     04-09  Mg     2.2     04-09     PT/INR - ( 09 Apr 2019 07:00 )   PT: 11.6 sec;   INR: 1.01 ratio         PTT - ( 09 Apr 2019 07:00 )  PTT:31.2 sec    PHYSICAL EXAM:  Gen: NAD, well-developed  Neuro: AAOX3, PERRL, CNII-XII grossly intact; CELAYA's equally bilaterally  Pulm: Respiration sunlabored.   GI: abd s. appropriately tender, minimally distended.  Ext: 2+ pulses throughout    CASEY Gonsalez MD, PGY-I  General Surgery, [facility]  Pager: [phone]

## 2019-04-09 NOTE — PROGRESS NOTE ADULT - ASSESSMENT
pt is a 74 y/o male  w/ no PMHx presents with acute abdominal pain w/ small bowel obstruction secondary to new cecal mass now with fevers and tachycardia 2/2 to urosepsis    +bc gram neg rods bacteremia  repeat cultures neg  from 4/5   id eval noted  cont abs   bph   flomax   uro cysto as per urology   fluid resuscitation prn  - Surgery  today for colonoscopy / colectomy    analgesics prn   -f/u labs noted  supp lytes prn   leukocytosis  resolved  anemia  transfuse prn   likely multifactorial   tylenol prn   - Lovenox for DVT prophylaxis

## 2019-04-09 NOTE — PRE-ANESTHESIA EVALUATION ADULT - NSANTHOSAYNRD_GEN_A_CORE
No. ANAI screening performed.  STOP BANG Legend: 0-2 = LOW Risk; 3-4 = INTERMEDIATE Risk; 5-8 = HIGH Risk

## 2019-04-09 NOTE — PROGRESS NOTE ADULT - SUBJECTIVE AND OBJECTIVE BOX
GENERAL SURGERY DAILY PROGRESS NOTE:       Subjective:  Pt seen and examined at bedside. . (-)N/V, (+) flatus/BM. Completed bowel preop. OR planning today for colonoscopy followed by R hemicolectomy.     Objective:    PE:  General: NAD, Lying in bed comfortably, alert, oriented x3  Pulm: Non-labored breathing on RA  GI/Abd: Soft, NT/ND, no rebound/guarding    Vital Signs Last 24 Hrs  T(C): 36.7 (08 Apr 2019 21:54), Max: 36.8 (08 Apr 2019 13:21)  T(F): 98.1 (08 Apr 2019 21:54), Max: 98.2 (08 Apr 2019 13:21)  HR: 78 (08 Apr 2019 21:54) (78 - 88)  BP: 153/81 (08 Apr 2019 21:54) (135/74 - 165/80)  BP(mean): --  RR: 18 (08 Apr 2019 21:54) (18 - 18)  SpO2: 100% (08 Apr 2019 21:54) (98% - 100%)    04-07-19 @ 07:01  -  04-08-19 @ 07:00  --------------------------------------------------------  IN: 5520 mL / OUT: 1350 mL / NET: 4170 mL    04-08-19 @ 07:01  -  04-09-19 @ 01:45  --------------------------------------------------------  IN: 1580 mL / OUT: 825 mL / NET: 755 mL      MEDICATIONS  (STANDING):  acetaminophen  IVPB .. 1000 milliGRAM(s) IV Intermittent once  cefTRIAXone   IVPB      cefTRIAXone   IVPB 1 Gram(s) IV Intermittent every 24 hours  dextrose 5% + sodium chloride 0.45% with potassium chloride 20 mEq/L 1000 milliLiter(s) (100 mL/Hr) IV Continuous <Continuous>  doxazosin 4 milliGRAM(s) Oral at bedtime  enoxaparin Injectable 40 milliGRAM(s) SubCutaneous daily  melatonin 3 milliGRAM(s) Oral at bedtime  pantoprazole  Injectable 40 milliGRAM(s) IV Push daily  phenazopyridine 100 milliGRAM(s) Oral every 8 hours    MEDICATIONS  (PRN):      LABS:                        7.7    4.0   )-----------( 238      ( 08 Apr 2019 07:15 )             26.3     04-08    139  |  105  |  5<L>  ----------------------------<  122<H>  3.6   |  22  |  0.71    Ca    8.5      08 Apr 2019 07:15  Phos  2.5     04-08  Mg     2.1     04-08

## 2019-04-09 NOTE — PROGRESS NOTE ADULT - GENERAL GENITOURINARY SYMPTOMS
increased urinary frequency/urinary hesitancy
dysuria/urinary hesitancy
urinary hesitancy/dysuria/improved
urinary hesitancy/increased urinary frequency

## 2019-04-10 LAB
ANION GAP SERPL CALC-SCNC: 12 MMOL/L — SIGNIFICANT CHANGE UP (ref 5–17)
BUN SERPL-MCNC: 9 MG/DL — SIGNIFICANT CHANGE UP (ref 7–23)
CALCIUM SERPL-MCNC: 8.3 MG/DL — LOW (ref 8.4–10.5)
CHLORIDE SERPL-SCNC: 102 MMOL/L — SIGNIFICANT CHANGE UP (ref 96–108)
CO2 SERPL-SCNC: 25 MMOL/L — SIGNIFICANT CHANGE UP (ref 22–31)
CREAT SERPL-MCNC: 0.81 MG/DL — SIGNIFICANT CHANGE UP (ref 0.5–1.3)
GLUCOSE SERPL-MCNC: 144 MG/DL — HIGH (ref 70–99)
HCT VFR BLD CALC: 24.4 % — LOW (ref 39–50)
HGB BLD-MCNC: 7.6 G/DL — LOW (ref 13–17)
MAGNESIUM SERPL-MCNC: 2.2 MG/DL — SIGNIFICANT CHANGE UP (ref 1.6–2.6)
MCHC RBC-ENTMCNC: 20.3 PG — LOW (ref 27–34)
MCHC RBC-ENTMCNC: 31.2 GM/DL — LOW (ref 32–36)
MCV RBC AUTO: 65 FL — LOW (ref 80–100)
PHOSPHATE SERPL-MCNC: 3 MG/DL — SIGNIFICANT CHANGE UP (ref 2.5–4.5)
PLATELET # BLD AUTO: 310 K/UL — SIGNIFICANT CHANGE UP (ref 150–400)
POTASSIUM SERPL-MCNC: 3.9 MMOL/L — SIGNIFICANT CHANGE UP (ref 3.5–5.3)
POTASSIUM SERPL-SCNC: 3.9 MMOL/L — SIGNIFICANT CHANGE UP (ref 3.5–5.3)
RBC # BLD: 3.75 M/UL — LOW (ref 4.2–5.8)
RBC # FLD: 16.7 % — HIGH (ref 10.3–14.5)
SODIUM SERPL-SCNC: 139 MMOL/L — SIGNIFICANT CHANGE UP (ref 135–145)
SURGICAL PATHOLOGY STUDY: SIGNIFICANT CHANGE UP
WBC # BLD: 10 K/UL — SIGNIFICANT CHANGE UP (ref 3.8–10.5)
WBC # FLD AUTO: 10 K/UL — SIGNIFICANT CHANGE UP (ref 3.8–10.5)

## 2019-04-10 PROCEDURE — 99232 SBSQ HOSP IP/OBS MODERATE 35: CPT

## 2019-04-10 RX ADMIN — Medication 4 MILLIGRAM(S): at 22:58

## 2019-04-10 RX ADMIN — PANTOPRAZOLE SODIUM 40 MILLIGRAM(S): 20 TABLET, DELAYED RELEASE ORAL at 11:44

## 2019-04-10 RX ADMIN — Medication 1000 MILLIGRAM(S): at 19:32

## 2019-04-10 RX ADMIN — Medication 100 MILLIGRAM(S): at 22:41

## 2019-04-10 RX ADMIN — Medication 3 MILLIGRAM(S): at 22:41

## 2019-04-10 RX ADMIN — CEFTRIAXONE 100 GRAM(S): 500 INJECTION, POWDER, FOR SOLUTION INTRAMUSCULAR; INTRAVENOUS at 22:40

## 2019-04-10 RX ADMIN — SODIUM CHLORIDE 40 MILLILITER(S): 9 INJECTION, SOLUTION INTRAVENOUS at 11:45

## 2019-04-10 RX ADMIN — Medication 100 MILLIGRAM(S): at 05:29

## 2019-04-10 RX ADMIN — ENOXAPARIN SODIUM 40 MILLIGRAM(S): 100 INJECTION SUBCUTANEOUS at 11:44

## 2019-04-10 RX ADMIN — Medication 400 MILLIGRAM(S): at 19:02

## 2019-04-10 RX ADMIN — Medication 400 MILLIGRAM(S): at 23:41

## 2019-04-10 RX ADMIN — Medication 100 MILLIGRAM(S): at 13:56

## 2019-04-10 NOTE — PROGRESS NOTE ADULT - SUBJECTIVE AND OBJECTIVE BOX
Surgery Green Team Daily Progress Note     EMILIO MULLER | [id number]    SUBJECTIVE / 24H EVENTS  Patient seen and examined on morning rounds. No acute events overnight. Pain well controlled. No nausea / vomiting. Voiding spontaneously.     OBJECTIVE:    VITAL SIGNS:  T(C): 36.7 (19 @ 23:45), Max: 36.7 (19 @ 05:19)  HR: 90 (19 @ 23:45) (63 - 95)  BP: 104/61 (19 @ 23:45) (104/61 - 171/85)  RR: 17 (19 @ 23:45) (14 - 18)  SpO2: 100% (19 @ 23:45) (98% - 100%)  Daily Height in cm: 170.18 (2019 13:49)    Daily Weight in k (2019 12:36)    PHYSICAL EXAM:  Gen: NAD  LS: Respirations unlabored.  GI: Soft. Minimally tender, most significantly in RL. Nondistended.   Ext: Warm, well perfused      19 @ 07:01  -  19 @ 07:00  --------------------------------------------------------  IN:    dextrose 5% + sodium chloride 0.45% with potassium chloride 20 mEq/L: 2300 mL    Oral Fluid: 770 mL  Total IN: 3070 mL    OUT:    Voided: 1825 mL  Total OUT: 1825 mL    Total NET: 1245 mL      19 @ 07:01  -  04-10-19 @ 00:27  --------------------------------------------------------  IN:    dextrose 5% + sodium chloride 0.45% with potassium chloride 20 mEq/L: 600 mL    lactated ringers.: 40 mL  Total IN: 640 mL    OUT:    Indwelling Catheter - Urethral: 550 mL    Voided: 400 mL  Total OUT: 950 mL    Total NET: -310 mL          LAB VALUES:      139  |  103  |  4<L>  ----------------------------<  126<H>  3.9   |  25  |  0.80    Ca    8.8      2019 06:58  Phos  2.9       Mg     2.2                                      7.8    3.6   )-----------( 263      ( 2019 07:01 )             27.1       PT/INR - ( 2019 07:00 )   PT: 11.6 sec;   INR: 1.01 ratio         PTT - ( 2019 07:00 )  PTT:31.2 sec      MICROBIOLOGY:    Culture - Urine (collected 2019 19:57)  Source: .Urine Clean Catch (Midstream)  Final Report (2019 16:01):    No growth      No new microbiology data for review.     RADIOLOGY:    No new radiographic images for review.    MEDICATIONS  (STANDING):  acetaminophen  IVPB .. 1000 milliGRAM(s) IV Intermittent once  acetaminophen  IVPB .. 1000 milliGRAM(s) IV Intermittent once  acetaminophen  IVPB .. 1000 milliGRAM(s) IV Intermittent once  acetaminophen  IVPB .. 1000 milliGRAM(s) IV Intermittent once  cefTRIAXone   IVPB 1 Gram(s) IV Intermittent every 24 hours  doxazosin 4 milliGRAM(s) Oral at bedtime  enoxaparin Injectable 40 milliGRAM(s) SubCutaneous daily  lactated ringers. 1000 milliLiter(s) (40 mL/Hr) IV Continuous <Continuous>  melatonin 3 milliGRAM(s) Oral at bedtime  morphine PF Spinal 0.2 milliGRAM(s) IntraThecal. once  pantoprazole  Injectable 40 milliGRAM(s) IV Push daily  phenazopyridine 100 milliGRAM(s) Oral every 8 hours    MEDICATIONS  (PRN): Surgery Green Team Daily Progress Note     EMILIO MULLER | [id number]    SUBJECTIVE / 24H EVENTS  Patient seen and examined on morning rounds. No acute events overnight. Pain well controlled. No nausea / vomiting.      OBJECTIVE:    VITAL SIGNS:  T(C): 36.7 (19 @ 23:45), Max: 36.7 (19 @ 05:19)  HR: 90 (19 @ 23:45) (63 - 95)  BP: 104/61 (19 @ 23:45) (104/61 - 171/85)  RR: 17 (19 @ 23:45) (14 - 18)  SpO2: 100% (19 @ 23:45) (98% - 100%)  Daily Height in cm: 170.18 (2019 13:49)    Daily Weight in k (2019 12:36)    PHYSICAL EXAM:  Gen: NAD  LS: Respirations unlabored.  GI: Soft. Minimally tender, most significantly in RL. Nondistended.   Ext: Warm, well perfused      19 @ 07:01  -  19 @ 07:00  --------------------------------------------------------  IN:    dextrose 5% + sodium chloride 0.45% with potassium chloride 20 mEq/L: 2300 mL    Oral Fluid: 770 mL  Total IN: 3070 mL    OUT:    Voided: 1825 mL  Total OUT: 1825 mL    Total NET: 1245 mL      19 @ 07:01  -  04-10-19 @ 00:27  --------------------------------------------------------  IN:    dextrose 5% + sodium chloride 0.45% with potassium chloride 20 mEq/L: 600 mL    lactated ringers.: 40 mL  Total IN: 640 mL    OUT:    Indwelling Catheter - Urethral: 550 mL    Voided: 400 mL  Total OUT: 950 mL    Total NET: -310 mL          LAB VALUES:      139  |  103  |  4<L>  ----------------------------<  126<H>  3.9   |  25  |  0.80    Ca    8.8      2019 06:58  Phos  2.9       Mg     2.2                                      7.8    3.6   )-----------( 263      ( 2019 07:01 )             27.1       PT/INR - ( 2019 07:00 )   PT: 11.6 sec;   INR: 1.01 ratio         PTT - ( 2019 07:00 )  PTT:31.2 sec      MICROBIOLOGY:    Culture - Urine (collected 2019 19:57)  Source: .Urine Clean Catch (Midstream)  Final Report (2019 16:01):    No growth      No new microbiology data for review.     RADIOLOGY:    No new radiographic images for review.    MEDICATIONS  (STANDING):  acetaminophen  IVPB .. 1000 milliGRAM(s) IV Intermittent once  acetaminophen  IVPB .. 1000 milliGRAM(s) IV Intermittent once  acetaminophen  IVPB .. 1000 milliGRAM(s) IV Intermittent once  acetaminophen  IVPB .. 1000 milliGRAM(s) IV Intermittent once  cefTRIAXone   IVPB 1 Gram(s) IV Intermittent every 24 hours  doxazosin 4 milliGRAM(s) Oral at bedtime  enoxaparin Injectable 40 milliGRAM(s) SubCutaneous daily  lactated ringers. 1000 milliLiter(s) (40 mL/Hr) IV Continuous <Continuous>  melatonin 3 milliGRAM(s) Oral at bedtime  morphine PF Spinal 0.2 milliGRAM(s) IntraThecal. once  pantoprazole  Injectable 40 milliGRAM(s) IV Push daily  phenazopyridine 100 milliGRAM(s) Oral every 8 hours    MEDICATIONS  (PRN):

## 2019-04-10 NOTE — PROGRESS NOTE ADULT - SUBJECTIVE AND OBJECTIVE BOX
patient had mariscal placed intraop with pvr 1200cc   catheter inserted without difficulty and clear urine    stable post op  mariscal with clear urine

## 2019-04-10 NOTE — PROGRESS NOTE ADULT - ASSESSMENT
73M w/ no PMHx now s/p Lap right hemicolectomy (4/9) POD1. Patient hemodynamically stable recovering well at present time. 	    - CLD w/IVF  - f/u labs  - Ceftriaxone for UTI  - Strict Is and Os  - Appreciate hospitalist recs  - c/w davey at present time  - appreciate continued management by urology.   - encourage ambulation / cough / deep breathing / incentive spirometry     Green Surgery Pager [phone] 73M w/ no PMHx now s/p Lap right hemicolectomy (4/9) POD1. Patient hemodynamically stable recovering well at present time. 	    - Continue ERAS diet protocol.  - f/u labs  - Ceftriaxone for UTI  - Strict Is and Os  - Appreciate hospitalist recs  - c/w davey at present time  - appreciate continued management by urology.   - encourage ambulation / cough / deep breathing / incentive spirometry     Green Surgery Pager [phone] 73M s/p Lap right hemicolectomy (4/9) POD1. Patient hemodynamically stable recovering well at present time. Had UTI causing sepsis last week d/t urinary retention present on admission. 	    - Continue ERAS diet protocol.  - f/u labs  - Ceftriaxone for UTI  - Strict Is and Os  - Appreciate hospitalist recs  - c/w mariscal at present time  - appreciate continued management by urology.   - encourage ambulation / cough / deep breathing / incentive spirometry     Green Surgery Pager [phone]

## 2019-04-10 NOTE — PROGRESS NOTE ADULT - ASSESSMENT
pt is a 74 y/o male  w/ no PMHx presents with acute abdominal pain w/ small bowel obstruction secondary to new cecal mass now with fevers and tachycardia 2/2 to urosepsis    +bc gram neg rods bacteremia  repeat cultures neg  from 4/5   id eval noted  cont abs   bph  meds as per uro  s/p colectomy   analgesics prn   -f/u labs noted  supp lytes prn   leukocytosis  resolved  anemia  transfuse prn   likely multifactorial   tylenol prn   - Lovenox for DVT prophylaxis

## 2019-04-10 NOTE — PROGRESS NOTE ADULT - SUBJECTIVE AND OBJECTIVE BOX
Day 1 of Anesthesia Pain Management Service    SUBJECTIVE:  Pain Scale Score:          [X] Refer to charted pain scores    THERAPY:    s/p 0.2 mg PF morphine on 4/9      MEDICATIONS  (STANDING):  acetaminophen  IVPB .. 1000 milliGRAM(s) IV Intermittent once  acetaminophen  IVPB .. 1000 milliGRAM(s) IV Intermittent once  acetaminophen  IVPB .. 1000 milliGRAM(s) IV Intermittent once  cefTRIAXone   IVPB 1 Gram(s) IV Intermittent every 24 hours  doxazosin 4 milliGRAM(s) Oral at bedtime  enoxaparin Injectable 40 milliGRAM(s) SubCutaneous daily  lactated ringers. 1000 milliLiter(s) (40 mL/Hr) IV Continuous <Continuous>  melatonin 3 milliGRAM(s) Oral at bedtime  morphine PF Spinal 0.2 milliGRAM(s) IntraThecal. once  pantoprazole  Injectable 40 milliGRAM(s) IV Push daily  phenazopyridine 100 milliGRAM(s) Oral every 8 hours    MEDICATIONS  (PRN):      OBJECTIVE:    Sedation:        	[X] Alert	[ ] Drowsy	[ ] Arousable      [ ] Asleep       [ ] Unresponsive    Side Effects:	[X] None	[ ] Nausea	[ ] Vomiting         [ ] Pruritus  		[ ] Weakness            [ ] Numbness	          [ ] Other:    Vital Signs Last 24 Hrs  T(C): 36.8 (10 Apr 2019 05:22), Max: 36.8 (10 Apr 2019 05:22)  T(F): 98.3 (10 Apr 2019 05:22), Max: 98.3 (10 Apr 2019 05:22)  HR: 86 (10 Apr 2019 05:22) (63 - 95)  BP: 100/57 (10 Apr 2019 05:22) (100/57 - 171/85)  BP(mean): 106 (09 Apr 2019 20:30) (92 - 107)  RR: 18 (10 Apr 2019 05:22) (14 - 18)  SpO2: 100% (10 Apr 2019 05:22) (98% - 100%)    ASSESSMENT/ PLAN  [X] Patient transitioned to prn analgesics  [X] Pain management per primary service, pain service to sign off   [X]Documentation and Verification of current medications

## 2019-04-10 NOTE — PROGRESS NOTE ADULT - ASSESSMENT
acute vs chronic retention   bph  resume cardura when tolerating po    continue mariscal   ? tov prior to discharge vs discharge with mariscal  explained in detail to patient and his daughter at bedside.

## 2019-04-10 NOTE — PROGRESS NOTE ADULT - ATTENDING COMMENTS
Jude Kingsley  Attending Physician   Division of Infectious Disease  Pager #[phone]  After 5pm/weekend or no response, call #[phone]

## 2019-04-10 NOTE — PROGRESS NOTE ADULT - ASSESSMENT
73M w/ no PMHx presents with acute abdominal pain x12 hours with fever, leukocytosis, urine retention, GNR UTI/bacteremia, sepsis     4/9 - s/p right hemicolectomy with anastomosis

## 2019-04-10 NOTE — PROGRESS NOTE ADULT - SUBJECTIVE AND OBJECTIVE BOX
CHIEF COMPLAINT:Patient is a 73y old  Male who presents with a chief complaint of abdominal pain (10 Apr 2019 07:50)    	        PAST MEDICAL & SURGICAL HISTORY:  No pertinent past medical history  No significant past surgical history          REVIEW OF SYSTEMS:  CONSTITUTIONAL: No fever, weight loss, or fatigue  EYES: No eye pain, visual disturbances, or discharge  NECK: No pain or stiffness  RESPIRATORY: No cough, wheezing, chills or hemoptysis; No Shortness of Breath  CARDIOVASCULAR: No chest pain, palpitations, passing out, dizziness, or leg swelling  GASTROINTESTINAL: mild abd pain   GENITOURINARY: No dysuria, frequency, hematuria, or incontinence  NEUROLOGICAL: No headaches, memory loss, loss of strength, numbness, or tremors  SKIN: No itching, burning, rashes, or lesions   MUSCULOSKELETAL: No joint pain or swelling; No muscle, back, or extremity pain    Medications:  MEDICATIONS  (STANDING):  acetaminophen  IVPB .. 1000 milliGRAM(s) IV Intermittent once  acetaminophen  IVPB .. 1000 milliGRAM(s) IV Intermittent once  acetaminophen  IVPB .. 1000 milliGRAM(s) IV Intermittent once  cefTRIAXone   IVPB 1 Gram(s) IV Intermittent every 24 hours  doxazosin 4 milliGRAM(s) Oral at bedtime  enoxaparin Injectable 40 milliGRAM(s) SubCutaneous daily  lactated ringers. 1000 milliLiter(s) (40 mL/Hr) IV Continuous <Continuous>  melatonin 3 milliGRAM(s) Oral at bedtime  pantoprazole  Injectable 40 milliGRAM(s) IV Push daily  phenazopyridine 100 milliGRAM(s) Oral every 8 hours    MEDICATIONS  (PRN):    	    PHYSICAL EXAM:  T(C): 36.8 (04-10-19 @ 09:15), Max: 36.8 (04-10-19 @ 05:22)  HR: 92 (04-10-19 @ 09:15) (63 - 92)  BP: 120/65 (04-10-19 @ 09:15) (100/57 - 171/85)  RR: 16 (04-10-19 @ 09:15) (14 - 18)  SpO2: 96% (04-10-19 @ 09:15) (96% - 100%)  Wt(kg): --  I&O's Summary    09 Apr 2019 07:01  -  10 Apr 2019 07:00  --------------------------------------------------------  IN: 1320 mL / OUT: 1475 mL / NET: -155 mL    10 Apr 2019 07:01  -  10 Apr 2019 10:29  --------------------------------------------------------  IN: 240 mL / OUT: 400 mL / NET: -160 mL        Appearance: Normal	  HEENT:   Normal oral mucosa, PERRL, EOMI	  Lymphatic: No lymphadenopathy  Cardiovascular: Normal S1 S2, No JVD, No murmurs, No edema  Respiratory: Lungs clear to auscultation	  Psychiatry: A & O x 3,  Gastrointestinal:  Soft, No r/g bs dec   Skin: No rashes, No ecchymoses, No cyanosis	  Neurologic: Non-focal  Extremities: Normal range of motion, No clubbing, cyanosis or edema  Vascular: Peripheral pulses palpable 2+ bilaterally    TELEMETRY: 	    ECG:  	  RADIOLOGY:  OTHER: 	  	  LABS:	 	    CARDIAC MARKERS:                                7.6    10.0  )-----------( 310      ( 10 Apr 2019 06:26 )             24.4     04-10    139  |  102  |  9   ----------------------------<  144<H>  3.9   |  25  |  0.81    Ca    8.3<L>      10 Apr 2019 06:26  Phos  3.0     04-10  Mg     2.2     04-10      proBNP:   Lipid Profile:   HgA1c:   TSH:

## 2019-04-10 NOTE — PROGRESS NOTE ADULT - SUBJECTIVE AND OBJECTIVE BOX
EMILIO MULLER 73y [id number]    Patient is a 73y old  Male who presents with a chief complaint of abdominal pain (10 Apr 2019 07:50)      Follow Up/CC:  ID following for bacteremia    Interval History/ROS: feels well, no fever, mariscal in now    Allergies    No Known Allergies    Intolerances        ANTIMICROBIALS:  cefTRIAXone   IVPB 1 every 24 hours      MEDICATIONS  (STANDING):  acetaminophen  IVPB .. 1000 milliGRAM(s) IV Intermittent once  acetaminophen  IVPB .. 1000 milliGRAM(s) IV Intermittent once  cefTRIAXone   IVPB 1 Gram(s) IV Intermittent every 24 hours  doxazosin 4 milliGRAM(s) Oral at bedtime  enoxaparin Injectable 40 milliGRAM(s) SubCutaneous daily  lactated ringers. 1000 milliLiter(s) (40 mL/Hr) IV Continuous <Continuous>  melatonin 3 milliGRAM(s) Oral at bedtime  pantoprazole  Injectable 40 milliGRAM(s) IV Push daily  phenazopyridine 100 milliGRAM(s) Oral every 8 hours    MEDICATIONS  (PRN):        Vital Signs Last 24 Hrs  T(C): 37.1 (10 Apr 2019 13:23), Max: 37.1 (10 Apr 2019 13:23)  T(F): 98.8 (10 Apr 2019 13:23), Max: 98.8 (10 Apr 2019 13:23)  HR: 96 (10 Apr 2019 13:23) (63 - 96)  BP: 154/71 (10 Apr 2019 13:23) (100/57 - 154/71)  BP(mean): 106 (09 Apr 2019 20:30) (92 - 107)  RR: 16 (10 Apr 2019 13:23) (14 - 18)  SpO2: 97% (10 Apr 2019 13:23) (96% - 100%)    CBC Full  -  ( 10 Apr 2019 06:26 )  WBC Count : 10.0 K/uL  RBC Count : 3.75 M/uL  Hemoglobin : 7.6 g/dL  Hematocrit : 24.4 %  Platelet Count - Automated : 310 K/uL  Mean Cell Volume : 65.0 fl  Mean Cell Hemoglobin : 20.3 pg  Mean Cell Hemoglobin Concentration : 31.2 gm/dL  Auto Neutrophil # : x  Auto Lymphocyte # : x  Auto Monocyte # : x  Auto Eosinophil # : x  Auto Basophil # : x  Auto Neutrophil % : x  Auto Lymphocyte % : x  Auto Monocyte % : x  Auto Eosinophil % : x  Auto Basophil % : x    04-10    139  |  102  |  9   ----------------------------<  144<H>  3.9   |  25  |  0.81    Ca    8.3<L>      10 Apr 2019 06:26  Phos  3.0     04-10  Mg     2.2     04-10            MICROBIOLOGY:  .Urine Clean Catch (Midstream)  04-08-19   No growth  --  --      .Blood Blood  04-05-19   No growth to date.  --  --      .Blood Blood-Peripheral  04-03-19   Growth in aerobic bottle: Escherichia coli  "Due to technical problems, Proteus sp. will Not be reported as part of  the BCID panel until further notice"  ***Blood Panel PCR results on this specimen are available  approximately 3 hours after the Gram stain result.***  Gram stain, PCR, and/or culture results may not always  correspond due to difference in methodologies.  ************************************************************  This PCR assay was performed using [employer].  The following targets are tested for: Enterococcus,  vancomycin resistant enterococci, Listeria monocytogenes,  coagulase negative staphylococci, S. aureus,  methicillin resistant S. aureus, Streptococcus agalactiae  (Group B), S. pneumoniae, S. pyogenes (Group A),  Acinetobacter baumannii, Enterobacter cloacae, E. coli,  Klebsiella oxytoca, K. pneumoniae, Proteus sp.,  Serratia marcescens, Haemophilus influenzae,  Neisseria meningitidis, Pseudomonas aeruginosa, Candida  albicans, C. glabrata, C krusei, C parapsilosis,  C. tropicalis and the KPC resistance gene.  --  Blood Culture PCR  Escherichia coli      .Urine Clean Catch (Midstream)  04-03-19   >100,000 CFU/ml Escherichia coli  --  Escherichia coli      .Blood Blood-Peripheral  04-03-19   No growth at 5 days.  --  --      .Urine Clean Catch (Midstream)  04-01-19   <10,000 CFU/mL Normal Urogenital Amalia  --  --        RADIOLOGY    < from: Xray Chest 1 View AP/PA (04.08.19 @ 06:56) >  Clear lungs.    < end of copied text >

## 2019-04-10 NOTE — PROGRESS NOTE ADULT - ATTENDING COMMENTS
I saw and examined the pt this morning at 8:35 am and discussed the tx plan with the House Staff. I agree with the exam and plan as documented in the surgery resident's note from today which I edited as indicated.  D/w daughter at the bedside.  Comfortable. No nausea.  Abdomen soft, NT, minimally distended.  Coronado in place.  Continue Enhanced recovery protocol.  Coronado placed in the OR after the pt voided just prior to arriving to the OR - 1200 cc drained upon insertion. Coronado management as per Urology - I d/w Dr Goldberg, who wants to keep the Coronado in given high risk of urinary retention and recurrent UTI if the Coronado is removed at this time.   Sarah Painter MD I saw and examined the pt this morning at 8:35 am and discussed the tx plan with the House Staff. I agree with the exam and plan as documented in the surgery resident's note from today which I edited as indicated.  D/w daughter at the bedside.  Comfortable. No nausea.  Abdomen soft, NT, minimally distended.  Coronado in place.  Continue Enhanced recovery protocol.  Coronado placed in the OR after the pt voided just prior to arriving to the OR - 1200 cc drained upon insertion. Coronado management as per Urology - I d/w Dr Goldberg, who wants to keep the Coronado in given high risk of urinary retention and recurrent UTI if the Coronado is removed at this time.   Complete ABX for the UTI with recent sepsis as per ID.  Sarah Painter MD

## 2019-04-11 LAB
ANION GAP SERPL CALC-SCNC: 10 MMOL/L — SIGNIFICANT CHANGE UP (ref 5–17)
BUN SERPL-MCNC: 10 MG/DL — SIGNIFICANT CHANGE UP (ref 7–23)
CALCIUM SERPL-MCNC: 8.5 MG/DL — SIGNIFICANT CHANGE UP (ref 8.4–10.5)
CHLORIDE SERPL-SCNC: 105 MMOL/L — SIGNIFICANT CHANGE UP (ref 96–108)
CO2 SERPL-SCNC: 26 MMOL/L — SIGNIFICANT CHANGE UP (ref 22–31)
CREAT SERPL-MCNC: 0.91 MG/DL — SIGNIFICANT CHANGE UP (ref 0.5–1.3)
CULTURE RESULTS: SIGNIFICANT CHANGE UP
GLUCOSE SERPL-MCNC: 102 MG/DL — HIGH (ref 70–99)
HCT VFR BLD CALC: 24.5 % — LOW (ref 39–50)
HGB BLD-MCNC: 7.4 G/DL — LOW (ref 13–17)
MAGNESIUM SERPL-MCNC: 2.3 MG/DL — SIGNIFICANT CHANGE UP (ref 1.6–2.6)
MCHC RBC-ENTMCNC: 19.9 PG — LOW (ref 27–34)
MCHC RBC-ENTMCNC: 30.3 GM/DL — LOW (ref 32–36)
MCV RBC AUTO: 65.6 FL — LOW (ref 80–100)
PHOSPHATE SERPL-MCNC: 2.2 MG/DL — LOW (ref 2.5–4.5)
PLATELET # BLD AUTO: 333 K/UL — SIGNIFICANT CHANGE UP (ref 150–400)
POTASSIUM SERPL-MCNC: 3.6 MMOL/L — SIGNIFICANT CHANGE UP (ref 3.5–5.3)
POTASSIUM SERPL-SCNC: 3.6 MMOL/L — SIGNIFICANT CHANGE UP (ref 3.5–5.3)
RBC # BLD: 3.73 M/UL — LOW (ref 4.2–5.8)
RBC # FLD: 16.8 % — HIGH (ref 10.3–14.5)
SODIUM SERPL-SCNC: 141 MMOL/L — SIGNIFICANT CHANGE UP (ref 135–145)
SPECIMEN SOURCE: SIGNIFICANT CHANGE UP
WBC # BLD: 5.8 K/UL — SIGNIFICANT CHANGE UP (ref 3.8–10.5)
WBC # FLD AUTO: 5.8 K/UL — SIGNIFICANT CHANGE UP (ref 3.8–10.5)

## 2019-04-11 PROCEDURE — 99232 SBSQ HOSP IP/OBS MODERATE 35: CPT

## 2019-04-11 RX ORDER — SODIUM,POTASSIUM PHOSPHATES 278-250MG
2 POWDER IN PACKET (EA) ORAL ONCE
Qty: 0 | Refills: 0 | Status: COMPLETED | OUTPATIENT
Start: 2019-04-11 | End: 2019-04-11

## 2019-04-11 RX ORDER — PANTOPRAZOLE SODIUM 20 MG/1
40 TABLET, DELAYED RELEASE ORAL
Qty: 0 | Refills: 0 | Status: DISCONTINUED | OUTPATIENT
Start: 2019-04-11 | End: 2019-04-12

## 2019-04-11 RX ORDER — ACETAMINOPHEN 500 MG
1000 TABLET ORAL ONCE
Qty: 0 | Refills: 0 | Status: COMPLETED | OUTPATIENT
Start: 2019-04-11 | End: 2019-04-11

## 2019-04-11 RX ADMIN — Medication 100 MILLIGRAM(S): at 06:12

## 2019-04-11 RX ADMIN — CEFTRIAXONE 100 GRAM(S): 500 INJECTION, POWDER, FOR SOLUTION INTRAMUSCULAR; INTRAVENOUS at 23:19

## 2019-04-11 RX ADMIN — Medication 4 MILLIGRAM(S): at 23:18

## 2019-04-11 RX ADMIN — Medication 400 MILLIGRAM(S): at 11:58

## 2019-04-11 RX ADMIN — Medication 1000 MILLIGRAM(S): at 12:28

## 2019-04-11 RX ADMIN — PANTOPRAZOLE SODIUM 40 MILLIGRAM(S): 20 TABLET, DELAYED RELEASE ORAL at 13:13

## 2019-04-11 RX ADMIN — Medication 1000 MILLIGRAM(S): at 00:25

## 2019-04-11 RX ADMIN — ENOXAPARIN SODIUM 40 MILLIGRAM(S): 100 INJECTION SUBCUTANEOUS at 13:13

## 2019-04-11 RX ADMIN — Medication 400 MILLIGRAM(S): at 20:42

## 2019-04-11 RX ADMIN — Medication 100 MILLIGRAM(S): at 13:13

## 2019-04-11 RX ADMIN — Medication 1000 MILLIGRAM(S): at 20:55

## 2019-04-11 RX ADMIN — Medication 2 TABLET(S): at 10:51

## 2019-04-11 RX ADMIN — Medication 3 MILLIGRAM(S): at 23:18

## 2019-04-11 RX ADMIN — Medication 100 MILLIGRAM(S): at 23:18

## 2019-04-11 NOTE — PROGRESS NOTE ADULT - GIT GEN HX ROS MEA POS PC
1 loose BM today
abdominal pain
abdominal pain/near incisions
near incisions/abdominal pain

## 2019-04-11 NOTE — PROGRESS NOTE ADULT - PROBLEM SELECTOR PLAN 1
-improving  -cont CTX  -total 7-10 days of abx - on day 6  -repeat bcx negative
-improving  -cont CTX  -total 7-10 days of abx - on day 8  -complete 2 more days abx and stop on 4/12  -repeat bcx negative
-improving  -cont CTX  -total 7-10 days of abx  -repeat bcx negative
-improving  -cont CTX  -total 7-10 days of abx - on day 7  -repeat bcx negative
-improving  -cont CTX
-improving  -cont CTX  -total 7-10 days of abx - on day 9  -complete 1 more days abx and stop on 4/12  -repeat bcx negative

## 2019-04-11 NOTE — PROGRESS NOTE ADULT - NEGATIVE RESPIRATORY AND THORAX SYMPTOMS
no dyspnea/no cough

## 2019-04-11 NOTE — PROGRESS NOTE ADULT - NEGATIVE ENMT SYMPTOMS
no ear pain/no nasal congestion/no throat pain
no ear pain/no throat pain/no nasal congestion
no throat pain/no ear pain/no nasal congestion
no throat pain/no ear pain/no nasal congestion

## 2019-04-11 NOTE — PROGRESS NOTE ADULT - GASTROINTESTINAL DETAILS
no guarding/no rebound tenderness/soft/nontender/no distention/no rigidity
no guarding/soft/no rigidity/no distention
no distention/no rebound tenderness/no rigidity/no guarding/soft/nontender
no distention/soft/no rebound tenderness/nontender/no guarding
no guarding/nontender/soft/no distention/no rigidity/no rebound tenderness
no guarding/nontender/soft/no rebound tenderness/no rigidity/no distention

## 2019-04-11 NOTE — PROGRESS NOTE ADULT - PROBLEM SELECTOR PLAN 4
-better  -trend temps

## 2019-04-11 NOTE — PROGRESS NOTE ADULT - CARDIOVASCULAR DETAILS
positive S2/positive S1
positive S2/positive S1
positive S1/positive S2
positive S2/positive S1

## 2019-04-11 NOTE — PROGRESS NOTE ADULT - NEUROLOGICAL DETAILS
responds to verbal commands/alert and oriented x 3
alert and oriented x 3/siting in chair/responds to verbal commands/normal strength
alert and oriented x 3/responds to verbal commands
responds to verbal commands/normal strength/alert and oriented x 3
responds to verbal commands/normal strength/alert and oriented x 3
alert and oriented x 3/responds to verbal commands/normal strength

## 2019-04-11 NOTE — PROGRESS NOTE ADULT - PROBLEM SELECTOR PROBLEM 4
Malignant neoplasm of ascending colon
Fever due to infection

## 2019-04-11 NOTE — PROGRESS NOTE ADULT - NEGATIVE NEUROLOGICAL SYMPTOMS
no headache/no confusion
no confusion/no headache
no headache/no confusion
no confusion/no headache
no headache/no confusion
no headache/no confusion

## 2019-04-11 NOTE — PROGRESS NOTE ADULT - NEGATIVE OPHTHALMOLOGIC SYMPTOMS
no pain L/no pain R
no pain R/no pain L

## 2019-04-11 NOTE — PROGRESS NOTE ADULT - CONSTITUTIONAL DETAILS
in chair/no distress
in chair, eating lunch/no distress/well-groomed
well-groomed/no distress/eating lunch
in chair/no distress
no distress/well-groomed
well-groomed/no distress

## 2019-04-11 NOTE — PROGRESS NOTE ADULT - NEGATIVE SKIN SYMPTOMS
no itching/no rash
no rash/no itching

## 2019-04-11 NOTE — PROGRESS NOTE ADULT - NEGATIVE GASTROINTESTINAL SYMPTOMS
no abdominal pain/no vomiting/no nausea
no diarrhea/no nausea/no vomiting
no diarrhea/no nausea/no vomiting
no nausea/no vomiting/no abdominal pain
no vomiting/no nausea/no diarrhea
no vomiting/no nausea/no abdominal pain

## 2019-04-11 NOTE — PROGRESS NOTE ADULT - SUBJECTIVE AND OBJECTIVE BOX
CHIEF COMPLAINT:Patient is a 73y old  Male who presents with a chief complaint of retention (11 Apr 2019 08:26)    	        PAST MEDICAL & SURGICAL HISTORY:  No pertinent past medical history  No significant past surgical history          REVIEW OF SYSTEMS:  CONSTITUTIONAL: feels better   EYES: No eye pain, visual disturbances, or discharge  NECK: No pain or stiffness  RESPIRATORY: No cough, wheezing, chills or hemoptysis; No Shortness of Breath  CARDIOVASCULAR: No chest pain, palpitations, passing out, dizziness, or leg swelling  GASTROINTESTINAL:dec abd pain   GENITOURINARY: No dysuria, frequency, hematuria, or incontinence  NEUROLOGICAL: No headaches, memory loss, loss of strength, numbness, or tremors  SKIN: No itching, burning, rashes, or lesions   MUSCULOSKELETAL: No joint pain or swelling; No muscle, back, or extremity pain    Medications:  MEDICATIONS  (STANDING):  acetaminophen  IVPB .. 1000 milliGRAM(s) IV Intermittent once  cefTRIAXone   IVPB 1 Gram(s) IV Intermittent every 24 hours  doxazosin 4 milliGRAM(s) Oral at bedtime  enoxaparin Injectable 40 milliGRAM(s) SubCutaneous daily  melatonin 3 milliGRAM(s) Oral at bedtime  pantoprazole    Tablet 40 milliGRAM(s) Oral before breakfast  phenazopyridine 100 milliGRAM(s) Oral every 8 hours    MEDICATIONS  (PRN):    	    PHYSICAL EXAM:  T(C): 36.9 (04-11-19 @ 09:18), Max: 37.1 (04-10-19 @ 13:23)  HR: 92 (04-11-19 @ 09:18) (79 - 96)  BP: 137/71 (04-11-19 @ 09:18) (101/52 - 154/72)  RR: 16 (04-11-19 @ 09:18) (16 - 18)  SpO2: 95% (04-11-19 @ 09:18) (94% - 100%)  Wt(kg): --  I&O's Summary    10 Apr 2019 07:01  -  11 Apr 2019 07:00  --------------------------------------------------------  IN: 1420 mL / OUT: 3175 mL / NET: -1755 mL    11 Apr 2019 07:01  -  11 Apr 2019 11:05  --------------------------------------------------------  IN: 360 mL / OUT: 400 mL / NET: -40 mL        Appearance: Normal	  HEENT:   Normal oral mucosa, PERRL, EOMI	  Lymphatic: No lymphadenopathy  Cardiovascular: Normal S1 S2, No JVD, No murmurs, No edema  Respiratory: Lungs clear to auscultation	  Psychiatry: A & O x 3,   Gastrointestinal:  Soft,  mildly tender no r/g  Skin: No rashes, No ecchymoses, No cyanosis	  Neurologic: Non-focal  Extremities: Normal range of motion, No clubbing, cyanosis or edema  Vascular: Peripheral pulses palpable 2+ bilaterally    TELEMETRY: 	    ECG:  	  RADIOLOGY:  OTHER: 	  	  LABS:	 	    CARDIAC MARKERS:                                7.4    5.8   )-----------( 333      ( 11 Apr 2019 07:10 )             24.5     04-11    141  |  105  |  10  ----------------------------<  102<H>  3.6   |  26  |  0.91    Ca    8.5      11 Apr 2019 07:10  Phos  2.2     04-11  Mg     2.3     04-11      proBNP:   Lipid Profile:   HgA1c:   TSH:

## 2019-04-11 NOTE — PROGRESS NOTE ADULT - RS GEN PE MLT RESP DETAILS PC
clear to auscultation bilaterally/respirations non-labored
good air movement/respirations non-labored/clear to auscultation bilaterally
clear to auscultation bilaterally/good air movement/respirations non-labored
respirations non-labored/clear to auscultation bilaterally
respirations non-labored/clear to auscultation bilaterally/good air movement
respirations non-labored/clear to auscultation bilaterally/good air movement

## 2019-04-11 NOTE — PROGRESS NOTE ADULT - ASSESSMENT
73M s/p Lap right hemicolectomy (4/9) POD1. Patient hemodynamically stable recovering well at present time. Had UTI causing sepsis last week d/t urinary retention present on admission. 	    - Continue ERAS diet protocol.  - Regular diet  - f/u labs  - Ceftriaxone for UTI  - Strict Is and Os  - Appreciate hospitalist recs  - c/w mariscal at present time  - appreciate continued management by urology.   - encourage ambulation / cough / deep breathing / incentive spirometry   - possible d/c with mariscal    Green Surgery Pager [phone]

## 2019-04-11 NOTE — PROGRESS NOTE ADULT - PROBLEM SELECTOR PROBLEM 5
Urinary retention due to benign prostatic hyperplasia
Leukocytosis

## 2019-04-11 NOTE — PROGRESS NOTE ADULT - MS EXT PE MLT D E PC
no pedal edema/no cyanosis
no cyanosis/no pedal edema
no pedal edema/no cyanosis

## 2019-04-11 NOTE — PROGRESS NOTE ADULT - SUBJECTIVE AND OBJECTIVE BOX
Surgery Green Team Daily Progress Note     EMILIO MULLER | [id number]    SUBJECTIVE / 24H EVENTS  Patient seen and examined on morning rounds. No acute events overnight. Pain well controlled. No nausea / vomiting.  Tolerating regular diet.    OBJECTIVE:      PHYSICAL EXAM:  Gen: NAD  LS: Respirations unlabored.  GI: Soft. Minimally tender, most significantly in RL. Nondistended.   Ext: Warm, well perfused    Vital Signs Last 24 Hrs  T(C): 36.4 (11 Apr 2019 01:12), Max: 37.1 (10 Apr 2019 13:23)  T(F): 97.5 (11 Apr 2019 01:12), Max: 98.8 (10 Apr 2019 13:23)  HR: 81 (11 Apr 2019 01:12) (79 - 96)  BP: 101/52 (11 Apr 2019 01:12) (100/57 - 154/72)  BP(mean): --  RR: 17 (11 Apr 2019 01:12) (16 - 18)  SpO2: 95% (11 Apr 2019 01:12) (95% - 100%)    04-09-19 @ 07:01  -  04-10-19 @ 07:00  --------------------------------------------------------  IN: 1320 mL / OUT: 1475 mL / NET: -155 mL    04-10-19 @ 07:01  -  04-11-19 @ 04:30  --------------------------------------------------------  IN: 1420 mL / OUT: 2225 mL / NET: -805 mL      MEDICATIONS  (STANDING):  cefTRIAXone   IVPB 1 Gram(s) IV Intermittent every 24 hours  doxazosin 4 milliGRAM(s) Oral at bedtime  enoxaparin Injectable 40 milliGRAM(s) SubCutaneous daily  lactated ringers. 1000 milliLiter(s) (40 mL/Hr) IV Continuous <Continuous>  melatonin 3 milliGRAM(s) Oral at bedtime  pantoprazole  Injectable 40 milliGRAM(s) IV Push daily  phenazopyridine 100 milliGRAM(s) Oral every 8 hours    MEDICATIONS  (PRN):      LABS:                        7.6    10.0  )-----------( 310      ( 10 Apr 2019 06:26 )             24.4     04-10    139  |  102  |  9   ----------------------------<  144<H>  3.9   |  25  |  0.81    Ca    8.3<L>      10 Apr 2019 06:26  Phos  3.0     04-10  Mg     2.2     04-10

## 2019-04-11 NOTE — PROGRESS NOTE ADULT - NEGATIVE GENERAL GENITOURINARY SYMPTOMS
no hematuria/no dysuria
no dysuria/no flank pain L/no hematuria/no flank pain R
no hematuria
no hematuria
no hematuria/no dysuria
no hematuria/no flank pain L/no dysuria/no flank pain R

## 2019-04-11 NOTE — PROGRESS NOTE ADULT - PROBLEM SELECTOR PROBLEM 3
[facility] Emergency Department Call Back     Faviola,    This is Gloria Diaz RN calling from [facility][note 3] regarding you recent visit. If you have any questions or concerns, please call the Emergency Department back at Dept: [phone].  If not, please disregard this message and have a great day.     
UTI (urinary tract infection)
Urinary retention due to benign prostatic hyperplasia

## 2019-04-11 NOTE — PROGRESS NOTE ADULT - ATTENDING COMMENTS
Jude Kingsley  Attending Physician   Division of Infectious Disease  Pager #[phone]  After 5pm/weekend or no response, call #[phone]    Will sign off, recall ID if needed #[phone].

## 2019-04-11 NOTE — PROGRESS NOTE ADULT - NEGATIVE GENERAL SYMPTOMS
no chills/no fever
no fever/no chills

## 2019-04-11 NOTE — PROGRESS NOTE ADULT - PROBLEM SELECTOR PLAN 3
stop flomax change to doxazosin 4mg qhs and monitor bp  re check pvr
- following
- following  -mariscal per 
- following
- following  -possible mariscal postop per 
- following
- following  -mariscal per

## 2019-04-11 NOTE — PROGRESS NOTE ADULT - SUBJECTIVE AND OBJECTIVE BOX
EMILIO MULLER 73y [id number]    Patient is a 73y old  Male who presents with a chief complaint of retention (11 Apr 2019 08:26)      Follow Up/CC:  ID following for uti    Interval History/ROS: feels well, no fever    Allergies    No Known Allergies    Intolerances        ANTIMICROBIALS:  cefTRIAXone   IVPB 1 every 24 hours      MEDICATIONS  (STANDING):  cefTRIAXone   IVPB 1 Gram(s) IV Intermittent every 24 hours  doxazosin 4 milliGRAM(s) Oral at bedtime  enoxaparin Injectable 40 milliGRAM(s) SubCutaneous daily  melatonin 3 milliGRAM(s) Oral at bedtime  pantoprazole    Tablet 40 milliGRAM(s) Oral before breakfast  phenazopyridine 100 milliGRAM(s) Oral every 8 hours    MEDICATIONS  (PRN):        Vital Signs Last 24 Hrs  T(C): 36.6 (11 Apr 2019 13:18), Max: 36.9 (10 Apr 2019 21:11)  T(F): 97.9 (11 Apr 2019 13:18), Max: 98.5 (10 Apr 2019 21:11)  HR: 80 (11 Apr 2019 13:18) (79 - 93)  BP: 134/69 (11 Apr 2019 13:18) (101/52 - 154/72)  BP(mean): --  RR: 16 (11 Apr 2019 13:18) (16 - 18)  SpO2: 98% (11 Apr 2019 13:18) (94% - 100%)    CBC Full  -  ( 11 Apr 2019 07:10 )  WBC Count : 5.8 K/uL  RBC Count : 3.73 M/uL  Hemoglobin : 7.4 g/dL  Hematocrit : 24.5 %  Platelet Count - Automated : 333 K/uL  Mean Cell Volume : 65.6 fl  Mean Cell Hemoglobin : 19.9 pg  Mean Cell Hemoglobin Concentration : 30.3 gm/dL  Auto Neutrophil # : x  Auto Lymphocyte # : x  Auto Monocyte # : x  Auto Eosinophil # : x  Auto Basophil # : x  Auto Neutrophil % : x  Auto Lymphocyte % : x  Auto Monocyte % : x  Auto Eosinophil % : x  Auto Basophil % : x    04-11    141  |  105  |  10  ----------------------------<  102<H>  3.6   |  26  |  0.91    Ca    8.5      11 Apr 2019 07:10  Phos  2.2     04-11  Mg     2.3     04-11            MICROBIOLOGY:  .Urine Clean Catch (Midstream)  04-08-19   No growth  --  --      .Blood Blood  04-05-19   No growth at 5 days.  --  --      .Blood Blood-Peripheral  04-03-19   Growth in aerobic bottle: Escherichia coli  "Due to technical problems, Proteus sp. will Not be reported as part of  the BCID panel until further notice"  ***Blood Panel PCR results on this specimen are available  approximately 3 hours after the Gram stain result.***  Gram stain, PCR, and/or culture results may not always  correspond due to difference in methodologies.  ************************************************************  This PCR assay was performed using [employer].  The following targets are tested for: Enterococcus,  vancomycin resistant enterococci, Listeria monocytogenes,  coagulase negative staphylococci, S. aureus,  methicillin resistant S. aureus, Streptococcus agalactiae  (Group B), S. pneumoniae, S. pyogenes (Group A),  Acinetobacter baumannii, Enterobacter cloacae, E. coli,  Klebsiella oxytoca, K. pneumoniae, Proteus sp.,  Serratia marcescens, Haemophilus influenzae,  Neisseria meningitidis, Pseudomonas aeruginosa, Candida  albicans, C. glabrata, C krusei, C parapsilosis,  C. tropicalis and the KPC resistance gene.  --  Blood Culture PCR  Escherichia coli      .Urine Clean Catch (Midstream)  04-03-19   >100,000 CFU/ml Escherichia coli  --  Escherichia coli      .Blood Blood-Peripheral  04-03-19   No growth at 5 days.  --  --      .Urine Clean Catch (Midstream)  04-01-19   <10,000 CFU/mL Normal Urogenital Amalia  --  --              v            RADIOLOGY

## 2019-04-11 NOTE — PROGRESS NOTE ADULT - PROBLEM SELECTOR PLAN 2
repeat urine culture
-due to Ecoli  -cont abx

## 2019-04-11 NOTE — PROGRESS NOTE ADULT - ASSESSMENT
pt is a 72 y/o male  w/ no PMHx presents with acute abdominal pain w/ small bowel obstruction secondary to new cecal mass now with fevers and tachycardia 2/2 to urosepsis    +bc gram neg rods bacteremia  repeat cultures neg  from 4/5   id eval noted  cont abs   bph  meds as per uro  s/p colectomy   analgesics prn   -f/u labs noted  supp lytes prn   leukocytosis  resolved  anemia  transfuse prn   likely multifactorial   tylenol prn   - Lovenox for DVT prophylaxis

## 2019-04-12 ENCOUNTER — TRANSCRIPTION ENCOUNTER (OUTPATIENT)
Age: 74
End: 2019-04-12

## 2019-04-12 VITALS
RESPIRATION RATE: 16 BRPM | SYSTOLIC BLOOD PRESSURE: 167 MMHG | TEMPERATURE: 98 F | DIASTOLIC BLOOD PRESSURE: 95 MMHG | HEART RATE: 89 BPM | OXYGEN SATURATION: 99 %

## 2019-04-12 LAB
ANION GAP SERPL CALC-SCNC: 9 MMOL/L — SIGNIFICANT CHANGE UP (ref 5–17)
BUN SERPL-MCNC: 8 MG/DL — SIGNIFICANT CHANGE UP (ref 7–23)
CALCIUM SERPL-MCNC: 8.7 MG/DL — SIGNIFICANT CHANGE UP (ref 8.4–10.5)
CHLORIDE SERPL-SCNC: 105 MMOL/L — SIGNIFICANT CHANGE UP (ref 96–108)
CO2 SERPL-SCNC: 26 MMOL/L — SIGNIFICANT CHANGE UP (ref 22–31)
CREAT SERPL-MCNC: 0.79 MG/DL — SIGNIFICANT CHANGE UP (ref 0.5–1.3)
GLUCOSE SERPL-MCNC: 108 MG/DL — HIGH (ref 70–99)
HCT VFR BLD CALC: 24.1 % — LOW (ref 39–50)
HGB BLD-MCNC: 7.3 G/DL — LOW (ref 13–17)
MAGNESIUM SERPL-MCNC: 2.1 MG/DL — SIGNIFICANT CHANGE UP (ref 1.6–2.6)
MCHC RBC-ENTMCNC: 20 PG — LOW (ref 27–34)
MCHC RBC-ENTMCNC: 30.5 GM/DL — LOW (ref 32–36)
MCV RBC AUTO: 65.7 FL — LOW (ref 80–100)
PHOSPHATE SERPL-MCNC: 2.4 MG/DL — LOW (ref 2.5–4.5)
PLATELET # BLD AUTO: 382 K/UL — SIGNIFICANT CHANGE UP (ref 150–400)
POTASSIUM SERPL-MCNC: 3.5 MMOL/L — SIGNIFICANT CHANGE UP (ref 3.5–5.3)
POTASSIUM SERPL-SCNC: 3.5 MMOL/L — SIGNIFICANT CHANGE UP (ref 3.5–5.3)
RBC # BLD: 3.66 M/UL — LOW (ref 4.2–5.8)
RBC # FLD: 17 % — HIGH (ref 10.3–14.5)
SODIUM SERPL-SCNC: 140 MMOL/L — SIGNIFICANT CHANGE UP (ref 135–145)
WBC # BLD: 6.7 K/UL — SIGNIFICANT CHANGE UP (ref 3.8–10.5)
WBC # FLD AUTO: 6.7 K/UL — SIGNIFICANT CHANGE UP (ref 3.8–10.5)

## 2019-04-12 PROCEDURE — 84132 ASSAY OF SERUM POTASSIUM: CPT

## 2019-04-12 PROCEDURE — 87186 SC STD MICRODIL/AGAR DIL: CPT

## 2019-04-12 PROCEDURE — 80048 BASIC METABOLIC PNL TOTAL CA: CPT

## 2019-04-12 PROCEDURE — 83605 ASSAY OF LACTIC ACID: CPT

## 2019-04-12 PROCEDURE — 81001 URINALYSIS AUTO W/SCOPE: CPT

## 2019-04-12 PROCEDURE — 74177 CT ABD & PELVIS W/CONTRAST: CPT

## 2019-04-12 PROCEDURE — 83690 ASSAY OF LIPASE: CPT

## 2019-04-12 PROCEDURE — 87086 URINE CULTURE/COLONY COUNT: CPT

## 2019-04-12 PROCEDURE — 71250 CT THORAX DX C-: CPT

## 2019-04-12 PROCEDURE — 82378 CARCINOEMBRYONIC ANTIGEN: CPT

## 2019-04-12 PROCEDURE — 84100 ASSAY OF PHOSPHORUS: CPT

## 2019-04-12 PROCEDURE — 80053 COMPREHEN METABOLIC PANEL: CPT

## 2019-04-12 PROCEDURE — 86900 BLOOD TYPING SEROLOGIC ABO: CPT

## 2019-04-12 PROCEDURE — 85014 HEMATOCRIT: CPT

## 2019-04-12 PROCEDURE — 71045 X-RAY EXAM CHEST 1 VIEW: CPT

## 2019-04-12 PROCEDURE — 86850 RBC ANTIBODY SCREEN: CPT

## 2019-04-12 PROCEDURE — 88309 TISSUE EXAM BY PATHOLOGIST: CPT

## 2019-04-12 PROCEDURE — 88341 IMHCHEM/IMCYTCHM EA ADD ANTB: CPT

## 2019-04-12 PROCEDURE — 99285 EMERGENCY DEPT VISIT HI MDM: CPT | Mod: 25

## 2019-04-12 PROCEDURE — 85730 THROMBOPLASTIN TIME PARTIAL: CPT

## 2019-04-12 PROCEDURE — 88342 IMHCHEM/IMCYTCHM 1ST ANTB: CPT

## 2019-04-12 PROCEDURE — 74176 CT ABD & PELVIS W/O CONTRAST: CPT

## 2019-04-12 PROCEDURE — 76770 US EXAM ABDO BACK WALL COMP: CPT

## 2019-04-12 PROCEDURE — 82435 ASSAY OF BLOOD CHLORIDE: CPT

## 2019-04-12 PROCEDURE — 85027 COMPLETE CBC AUTOMATED: CPT

## 2019-04-12 PROCEDURE — 88305 TISSUE EXAM BY PATHOLOGIST: CPT

## 2019-04-12 PROCEDURE — 87150 DNA/RNA AMPLIFIED PROBE: CPT

## 2019-04-12 PROCEDURE — 87040 BLOOD CULTURE FOR BACTERIA: CPT

## 2019-04-12 PROCEDURE — C1889: CPT

## 2019-04-12 PROCEDURE — 96374 THER/PROPH/DIAG INJ IV PUSH: CPT | Mod: XU

## 2019-04-12 PROCEDURE — 85610 PROTHROMBIN TIME: CPT

## 2019-04-12 PROCEDURE — 82803 BLOOD GASES ANY COMBINATION: CPT

## 2019-04-12 PROCEDURE — 84295 ASSAY OF SERUM SODIUM: CPT

## 2019-04-12 PROCEDURE — 86901 BLOOD TYPING SEROLOGIC RH(D): CPT

## 2019-04-12 PROCEDURE — 82330 ASSAY OF CALCIUM: CPT

## 2019-04-12 PROCEDURE — 82947 ASSAY GLUCOSE BLOOD QUANT: CPT

## 2019-04-12 PROCEDURE — 83735 ASSAY OF MAGNESIUM: CPT

## 2019-04-12 PROCEDURE — 43753 TX GASTRO INTUB W/ASP: CPT

## 2019-04-12 PROCEDURE — 93005 ELECTROCARDIOGRAM TRACING: CPT

## 2019-04-12 RX ORDER — OXYCODONE HYDROCHLORIDE 5 MG/1
1 TABLET ORAL
Qty: 12 | Refills: 0
Start: 2019-04-12

## 2019-04-12 RX ORDER — ACETAMINOPHEN 500 MG
975 TABLET ORAL ONCE
Qty: 0 | Refills: 0 | Status: COMPLETED | OUTPATIENT
Start: 2019-04-12 | End: 2019-04-12

## 2019-04-12 RX ORDER — DOXAZOSIN MESYLATE 4 MG
1 TABLET ORAL
Qty: 30 | Refills: 0
Start: 2019-04-12 | End: 2019-05-11

## 2019-04-12 RX ORDER — IRON SUCROSE 20 MG/ML
200 INJECTION, SOLUTION INTRAVENOUS ONCE
Qty: 0 | Refills: 0 | Status: COMPLETED | OUTPATIENT
Start: 2019-04-12 | End: 2019-04-12

## 2019-04-12 RX ORDER — PANTOPRAZOLE SODIUM 20 MG/1
1 TABLET, DELAYED RELEASE ORAL
Qty: 30 | Refills: 0
Start: 2019-04-12 | End: 2019-05-11

## 2019-04-12 RX ADMIN — ENOXAPARIN SODIUM 40 MILLIGRAM(S): 100 INJECTION SUBCUTANEOUS at 13:23

## 2019-04-12 RX ADMIN — IRON SUCROSE 110 MILLIGRAM(S): 20 INJECTION, SOLUTION INTRAVENOUS at 11:25

## 2019-04-12 RX ADMIN — Medication 975 MILLIGRAM(S): at 08:24

## 2019-04-12 RX ADMIN — Medication 975 MILLIGRAM(S): at 07:54

## 2019-04-12 RX ADMIN — Medication 100 MILLIGRAM(S): at 13:23

## 2019-04-12 RX ADMIN — PANTOPRAZOLE SODIUM 40 MILLIGRAM(S): 20 TABLET, DELAYED RELEASE ORAL at 05:53

## 2019-04-12 RX ADMIN — Medication 100 MILLIGRAM(S): at 05:53

## 2019-04-12 NOTE — PROGRESS NOTE ADULT - PROBLEM SELECTOR PROBLEM 1
Small bowel obstruction
Small bowel obstruction
Urinary retention due to benign prostatic hyperplasia
Sepsis due to Gram negative bacteria

## 2019-04-12 NOTE — DISCHARGE NOTE PROVIDER - CARE PROVIDER_API CALL
Sarah Painter)  ColonRectal Surgery; Surgery  [address], [address]  [place] [address]  Phone: [phone]  Fax: [phone]  Follow Up Time: 1 week    Goldberg, Gary D (MD)  Urology  [address], [facility] [address]  [place] [address]  Phone: [phone]  Fax: [phone]  Follow Up Time: 1 week

## 2019-04-12 NOTE — PROGRESS NOTE ADULT - ASSESSMENT
stable post op with indwelling [place]  discharge instructions reviewed  [place]on[place] to leg bag  continue Cardura 4mg qhs   outpatient gu trial of void and additional paris

## 2019-04-12 NOTE — PROGRESS NOTE ADULT - ATTENDING COMMENTS
I saw and examined the pt and discussed the tx plan with the House Staff. I agree with the exam and plan as documented in the surgery resident's note from today which I edited as indicated.  + bowel fx.  Abdomen soft, Nt, ND.  Stable.  Anemia, d/t the colon malignancy, preexistent prior to the surgery. Will give iron transfusion prior to discharge.  D/c today.  Sarah Painter MD

## 2019-04-12 NOTE — DISCHARGE NOTE PROVIDER - NSDCCPTREATMENT_GEN_ALL_CORE_FT
PRINCIPAL PROCEDURE  Procedure: Laparoscopic right hemicolectomy  Findings and Treatment: · Operative Findings	Lap right hemicolectomy. Primary ileocolic anastomosis using staplers.

## 2019-04-12 NOTE — PROGRESS NOTE ADULT - SUBJECTIVE AND OBJECTIVE BOX
Surgery Green Team Daily Progress Note     EMILIO MULLER | [id number]    SUBJECTIVE / 24H EVENTS  Patient seen and examined on morning rounds. No acute events overnight. Pain well controlled. No nausea / vomiting.  Tolerating regular diet. Abx to stop today.     OBJECTIVE:    PHYSICAL EXAM:  Gen: NAD  LS: Respirations unlabored.  GI: Soft. Minimally tender, most significantly in RL. Nondistended.   Ext: Warm, well perfused    Vital Signs Last 24 Hrs  T(C): 36.6 (12 Apr 2019 01:52), Max: 36.9 (11 Apr 2019 09:18)  T(F): 97.9 (12 Apr 2019 01:52), Max: 98.4 (11 Apr 2019 09:18)  HR: 84 (12 Apr 2019 01:52) (80 - 93)  BP: 146/72 (12 Apr 2019 01:52) (124/62 - 161/74)  BP(mean): --  RR: 18 (12 Apr 2019 01:52) (16 - 18)  SpO2: 96% (12 Apr 2019 01:52) (94% - 100%)    04-10-19 @ 07:01  -  04-11-19 @ 07:00  --------------------------------------------------------  IN: 1420 mL / OUT: 3175 mL / NET: -1755 mL    04-11-19 @ 07:01  - 04-12-19 @ 04:31  --------------------------------------------------------  IN: 1080 mL / OUT: 1975 mL / NET: -895 mL      MEDICATIONS  (STANDING):  cefTRIAXone   IVPB 1 Gram(s) IV Intermittent every 24 hours  doxazosin 4 milliGRAM(s) Oral at bedtime  enoxaparin Injectable 40 milliGRAM(s) SubCutaneous daily  melatonin 3 milliGRAM(s) Oral at bedtime  pantoprazole    Tablet 40 milliGRAM(s) Oral before breakfast  phenazopyridine 100 milliGRAM(s) Oral every 8 hours    MEDICATIONS  (PRN):      LABS:                        7.4    5.8   )-----------( 333      ( 11 Apr 2019 07:10 )             24.5     04-11    141  |  105  |  10  ----------------------------<  102<H>  3.6   |  26  |  0.91    Ca    8.5      11 Apr 2019 07:10  Phos  2.2     04-11  Mg     2.3     04-11 Surgery Green Team Daily Progress Note     EMILIO MULLER | [id number]    SUBJECTIVE / 24H EVENTS  Patient seen and examined on morning rounds. No acute events overnight. Pain well controlled. No nausea / vomiting.  Tolerating LRD. Abx to stop today.     OBJECTIVE:    PHYSICAL EXAM:  Gen: NAD  LS: Respirations unlabored.  GI: Soft. Minimally tender, most significantly in RL. Nondistended.   Ext: Warm, well perfused    Vital Signs Last 24 Hrs  T(C): 36.6 (12 Apr 2019 01:52), Max: 36.9 (11 Apr 2019 09:18)  T(F): 97.9 (12 Apr 2019 01:52), Max: 98.4 (11 Apr 2019 09:18)  HR: 84 (12 Apr 2019 01:52) (80 - 93)  BP: 146/72 (12 Apr 2019 01:52) (124/62 - 161/74)  BP(mean): --  RR: 18 (12 Apr 2019 01:52) (16 - 18)  SpO2: 96% (12 Apr 2019 01:52) (94% - 100%)    04-10-19 @ 07:01  -  04-11-19 @ 07:00  --------------------------------------------------------  IN: 1420 mL / OUT: 3175 mL / NET: -1755 mL    04-11-19 @ 07:01  - 04-12-19 @ 04:31  --------------------------------------------------------  IN: 1080 mL / OUT: 1975 mL / NET: -895 mL      MEDICATIONS  (STANDING):  cefTRIAXone   IVPB 1 Gram(s) IV Intermittent every 24 hours  doxazosin 4 milliGRAM(s) Oral at bedtime  enoxaparin Injectable 40 milliGRAM(s) SubCutaneous daily  melatonin 3 milliGRAM(s) Oral at bedtime  pantoprazole    Tablet 40 milliGRAM(s) Oral before breakfast  phenazopyridine 100 milliGRAM(s) Oral every 8 hours    MEDICATIONS  (PRN):      LABS:                        7.4    5.8   )-----------( 333      ( 11 Apr 2019 07:10 )             24.5     04-11    141  |  105  |  10  ----------------------------<  102<H>  3.6   |  26  |  0.91    Ca    8.5      11 Apr 2019 07:10  Phos  2.2     04-11  Mg     2.3     04-11

## 2019-04-12 NOTE — DISCHARGE NOTE PROVIDER - CARE PROVIDERS DIRECT ADDRESSES
[email or web address]

## 2019-04-12 NOTE — PROGRESS NOTE ADULT - ASSESSMENT
73M s/p Lap right hemicolectomy (4/9) POD1. Patient hemodynamically stable recovering well at present time. Had UTI causing sepsis last week d/t urinary retention present on admission. 	    - d/c abx today  - Continue ERAS diet protocol.  - Regular diet  - f/u labs  - Ceftriaxone for UTI  - Strict Is and Os  - Appreciate hospitalist recs  - c/w mariscal at present time  - appreciate continued management by urology.   - encourage ambulation / cough / deep breathing / incentive spirometry   - d/c today with mariscal, follow up with urology to take out mariscal    Green Surgery Pager [phone]

## 2019-04-12 NOTE — PROGRESS NOTE ADULT - PROBLEM SELECTOR PROBLEM 2
Fever due to infection
Malignant neoplasm of ascending colon
UTI (urinary tract infection)

## 2019-04-12 NOTE — DISCHARGE NOTE PROVIDER - PROVIDER TOKENS
PROVIDER:[TOKEN:[[id number]],FOLLOWUP:[1 week]],PROVIDER:[TOKEN:[[id number]],FOLLOWUP:[1 week]]

## 2019-04-12 NOTE — CHART NOTE - NSCHARTNOTEFT_GEN_A_CORE
Nutrition Follow Up Note  Patient seen for: Verbal consult received from RN as pt and daughter with questions regarding low fiber diet and high iron food sources. Per chart, 74 y/o male with no PMH admitted with acute abdominal pain concerning for small bowel obstruction secondary to newly imaged 5 cm cecal mass tachycardia 2/2 UTI/bacteremia, s/p Lap right hemicolectomy and now pending discharge today. Per RN, pt currently on Regular diet, but has been advised to follow low fiber diet. Daughter states MD advised pt to follow low fiber diet for next 4 weeks.     Source: medical records, daughter at bedside, patient, RN    Diet : Regular    Patient reports: no GI distress, +BM yesterday     PO intake : Pt reports good appetite and > 75% tolerance of meals     Source for PO intake: patient and daughter        Daily Weight in k.2 (-10), Weight in k (-), Weight in k.5 (-), Weight in k.5 ()  % Weight Change - wt loss noted     Pertinent Medications: MEDICATIONS  (STANDING):  cefTRIAXone   IVPB 1 Gram(s) IV Intermittent every 24 hours  doxazosin 4 milliGRAM(s) Oral at bedtime  enoxaparin Injectable 40 milliGRAM(s) SubCutaneous daily  iron sucrose IVPB 200 milliGRAM(s) IV Intermittent once  melatonin 3 milliGRAM(s) Oral at bedtime  pantoprazole    Tablet 40 milliGRAM(s) Oral before breakfast  phenazopyridine 100 milliGRAM(s) Oral every 8 hours    MEDICATIONS  (PRN):    - @ 06:23: Na 140, BUN 8, Cr 0.79, <H>, K+ 3.5, Phos 2.4<L>, Mg 2.1, Alk Phos --, ALT/SGPT --, AST/SGOT --, HbA1c --        Skin per nursing documentation: no pressure injuries  Edema: no edema    Estimated Needs:   [ x] no change since previous assessment  [ ] recalculated:     Previous Nutrition Diagnosis: altered GI function  Nutrition Diagnosis is: ongoing    New Nutrition Diagnosis: n/a  Related to:    As evidenced by:      Interventions:     Recommend  1) Low Fiber diet education provided. Identified food sources of fiber, foods recommended and not recommended on low fiber diet, food cooking and shopping tips, nutrition label reading tips to monitor for fiber contents. Recommended pt to slowly start adding back the fiber into diet when cleared by MD. High iron food sources also discussed and written printout provided.   2) Encouraged pt to consume adequate protein intakes, trying small frequent meals at home, and discussed importance of adequate nutrition intake.     Monitoring and Evaluation:     Continue to monitor Nutritional intake, Tolerance to diet prescription, weights, labs, skin integrity    RD remains available upon request and will follow up per protocol

## 2019-04-12 NOTE — DISCHARGE NOTE NURSING/CASE MANAGEMENT/SOCIAL WORK - NSDCDPATPORTLINK_GEN_ALL_CORE
You can access the [facility] Patient Portal, offered by [facility], by registering with the following website: [email or web address]

## 2019-04-12 NOTE — PROGRESS NOTE ADULT - ASSESSMENT
pt is a 72 y/o male  w/ no PMHx presents with acute abdominal pain w/ small bowel obstruction secondary to new cecal mass now with fevers and tachycardia 2/2 to urosepsis    +bc gram neg rods bacteremia  repeat cultures neg  from 4/5   id eval noted  abs as per id /   bph  meds as per uro  s/p colectomy   analgesics prn   -f/u labs noted  supp lytes prn   leukocytosis  resolved  anemia  transfuse prn   likely multifactorial   tylenol prn   - Lovenox for DVT prophylaxis  d.c as per sx

## 2019-04-12 NOTE — PROGRESS NOTE ADULT - SUBJECTIVE AND OBJECTIVE BOX
CHIEF COMPLAINT:Patient is a 73y old  Male who presents with a chief complaint of adrenal hemorrhage (12 Apr 2019 08:08)    	        PAST MEDICAL & SURGICAL HISTORY:  No pertinent past medical history  No significant past surgical history          REVIEW OF SYSTEMS:  CONSTITUTIONAL:feels well  EYES: No eye pain, visual disturbances, or discharge  NECK: No pain or stiffness  RESPIRATORY: No cough, wheezing, chills or hemoptysis; No Shortness of Breath  CARDIOVASCULAR: No chest pain, palpitations, passing out, dizziness, or leg swelling  GASTROINTESTINAL: mild abd discomfort   GENITOURINARY: No dysuria, frequency, hematuria, or incontinence  NEUROLOGICAL: No headaches, memory loss, loss of strength, numbness, or tremors    MUSCULOSKELETAL: No joint pain or swelling; No muscle, back, or extremity pain    Medications:  MEDICATIONS  (STANDING):  cefTRIAXone   IVPB 1 Gram(s) IV Intermittent every 24 hours  doxazosin 4 milliGRAM(s) Oral at bedtime  enoxaparin Injectable 40 milliGRAM(s) SubCutaneous daily  melatonin 3 milliGRAM(s) Oral at bedtime  pantoprazole    Tablet 40 milliGRAM(s) Oral before breakfast  phenazopyridine 100 milliGRAM(s) Oral every 8 hours    MEDICATIONS  (PRN):    	    PHYSICAL EXAM:  T(C): 36.8 (04-12-19 @ 09:15), Max: 36.8 (04-12-19 @ 09:15)  HR: 94 (04-12-19 @ 09:15) (80 - 94)  BP: 138/69 (04-12-19 @ 09:15) (134/69 - 161/74)  RR: 16 (04-12-19 @ 09:15) (16 - 18)  SpO2: 96% (04-12-19 @ 09:15) (96% - 100%)  Wt(kg): --  I&O's Summary    11 Apr 2019 07:01  -  12 Apr 2019 07:00  --------------------------------------------------------  IN: 1590 mL / OUT: 2700 mL / NET: -1110 mL    12 Apr 2019 07:01  -  12 Apr 2019 09:24  --------------------------------------------------------  IN: 360 mL / OUT: 350 mL / NET: 10 mL        Appearance: Normal	  HEENT:   Normal oral mucosa, PERRL, EOMI	  Lymphatic: No lymphadenopathy  Cardiovascular: Normal S1 S2, No JVD, No murmurs, No edema  Respiratory: Lungs clear to auscultation	  Psychiatry: A & O x 3  Gastrointestinal:  Soft, Non-tender, + BS	  Skin: No rashes, No ecchymoses, No cyanosis	  Neurologic: Non-focal  Extremities: Normal range of motion, No clubbing, cyanosis or edema  Vascular: Peripheral pulses palpable 2+ bilaterally    TELEMETRY: 	    ECG:  	  RADIOLOGY:  OTHER: 	  	  LABS:	 	    CARDIAC MARKERS:                                7.3    6.7   )-----------( 382      ( 12 Apr 2019 06:23 )             24.1     04-12    140  |  105  |  8   ----------------------------<  108<H>  3.5   |  26  |  0.79    Ca    8.7      12 Apr 2019 06:23  Phos  2.4     04-12  Mg     2.1     04-12      proBNP:   Lipid Profile:   HgA1c:   TSH:

## 2019-04-12 NOTE — DISCHARGE NOTE PROVIDER - NSDCACTIVITY_GEN_ALL_CORE
Do not drive or operate machinery/Do not make important decisions/No heavy lifting/straining/Showering allowed

## 2019-04-12 NOTE — DISCHARGE NOTE PROVIDER - NSDCCPCAREPLAN_GEN_ALL_CORE_FT
PRINCIPAL DISCHARGE DIAGNOSIS  Diagnosis: Small bowel obstruction  Assessment and Plan of Treatment: PAIN MEDICATION: You may take tylenol as needed for mild-moderate pain and oxycodone for moderate-severe pain as needed.  WOUND CARE: Staples will be removed at follow up office visit.    BATHING: Please do not submerge wound underwater. You may shower and/or sponge bathe.  ACTIVITY: No heavy lifting anything more than 10-15lbs or straining. Otherwise, you may return to your usual level of physical activity. If you are taking narcotic pain medication (such as Percocet), do NOT drive a car, operate machinery or make important decisions.  DIET: Low fiber diet  NOTIFY YOUR SURGEON IF: You have any bleeding that does not stop, any pus draining from your wound, any fever (over 100.4 F) or chills, persistent nausea/vomiting with inability to tolerate food or liquids, persistent diarrhea, or if your pain is not controlled on your discharge pain medications.  FOLLOW-UP:  1. Please call to make a follow-up appointment within one week of discharge.   2. Please follow up with your primary care physician in one week regarding your hospitalization.      SECONDARY DISCHARGE DIAGNOSES  Diagnosis: Urinary retention due to benign prostatic hyperplasia  Assessment and Plan of Treatment: Continue Coronado to leg bag  Continue Cardura 4mg at bedtime.  Follow up outpatient for trial of void and additional workup with Dr. Goldberg. Please call to make an appointment.    Diagnosis: Hypopotassemia  Assessment and Plan of Treatment: treated during hospital course    Diagnosis: Sepsis due to Gram negative bacteria  Assessment and Plan of Treatment: treated during hospital course    Diagnosis: Anemia  Assessment and Plan of Treatment: d/t the colon malignancy, preexistent prior to the surgery. pt given iron transfusion prior to discharge.    Diagnosis: UTI (urinary tract infection)  Assessment and Plan of Treatment: treated during hospital course.    Diagnosis: Malignant neoplasm of ascending colon  Assessment and Plan of Treatment: Pt underwent surgery.    Diagnosis: Hypophosphatemia  Assessment and Plan of Treatment: treated during hospital course    Diagnosis: Leukocytosis  Assessment and Plan of Treatment: resolved    Diagnosis: Fever due to infection  Assessment and Plan of Treatment: resolved

## 2019-04-12 NOTE — DISCHARGE NOTE PROVIDER - HOSPITAL COURSE
73M w/ no PMHx presents with acute abdominal pain x12 hours. He states that the pain began last night around 8:30 PM and was generalized, worst in the epigastrium. He also noted that his abdomen was distended and felt nauseated no emesis. He has never had this pain before. He denies any recent fevers, chills, CP/SOB, diarrhea/constipation. Last BM/flatus was yesterday PM. Patient has never had a colonoscopy.         In ED, patient's labs notable for leukocytosis to 10.4, venous lactate 1.7. CT abdomen pelvis demonstrates small bowel obstruction with transition point at level of ileocecal valve, associated with 5 cm cecal mass.     - Admit to green team surgery    - NPO + IVF    - NGT placed in ED, to LCWS    - No standing pain medication, requires surgical team evaluation prior to pain medication dosing     - No home medications    - Lovenox for DVT prophylaxis        HD2- keep NGT, NPO, IVFs. Staging non-con chest CT today to complete cancer staging.        Looking for OR time, Pt was scheduled for surgery on 4/3/2019.  Over night last night pt with tachycardia with recorded HR to 104-115.  Pt has remained hemodynamically stable and this am (4/3) pt became febrile to 101.5, however has remained normotensive.  Pt was complaining of suprapubic pain, hesitancy, urgency and frequency and so a UA was sent which was (+) LE and nitrites and antibiotics were started.  Pt was sent to CT scan for repeat imaging.  Given tachycardia and new fevers, SICU consult was obtained.  In the CT area, pt was awake , alert and oriented, conversant and c/o continued suprapubic pain. He denies any lightheadedness, shortness of breath or chest pain or pressure.      Pt febrile again later that day to 101.5 up to 102.7 and tachy -- obtained stat labs: CBC, BMP Mg/Phos, Lactate (results was remarkable for leukocytosis- 18.9 from 15.1 earlier in the day; H/H drop from 9.6/32/2 to 8.2/27.7; K of 3.4/Phos of 2.1; lactate of 1.5); electrolytes were repleted appropriately.    1L bolus was given. another 500cc bolus given after a couple of hours    continued to monitor uop (uop now 925cc)    continued to monitor (saw pt twice after initial encounter tonight; still nontoxic, abd soft, NTND)    -will continue zosyn    -pain control as needed    -f/u fever workup from yesterday.    Surgery cancelled yesterday d/t UTI causing picture of sepsis.        HD4- Medicine consulted for medical management.  Urology consulted for uti/retention.    Blood culture from 4/3 positive for growth in aerobic bottle gram negative rods. Pt had return of gi fxn.    CT with resolution of SBO, the fecalization of loops of distal SB has resolved and SB mush less distended - however the distal SB is thickened (d/t the recent obstruction).    Will need at least a few days to recover from the UTI.    Monitor NGT this morning, depending on amount we may be able to do clamping trial this afternoon, if he tolerates would d/c, give him Miralax and liquid diet and plan for surgery (and possible colonoscopy) next week.        HD5- Feeling well this morning. Tolerating clear liquids without nausea or vomiting. +flatus/BM. States that he experiences burning with urination and slow stream    ID consulted for abx rec.        HD6- Pt seen and examined at bedside. Pt c/o severe burning and frequency of urination overnight. (-)N/V, (+) flatus/BM. UCx sensitivities returned and abx switched from zosyn to ceftriaxone        HD7- - CLD w ensure    - Moviprep today and Monday    - C-scope 4/9 morning, surgery 4/9 afternoon        HD8- Will obtain CT cystogram per Urology request.        HD9- Completed bowel preop. OR planning today for colonoscopy followed by R hemicolectomy.     Pt underwent Lap right hemicolectomy. Primary ileocolic anastomosis using staplers. Pt tolerated procedure well, was extubated, and sent to pacu then floor.    patient had mariscal placed intraop with pvr 1200cc -catheter inserted without difficulty and clear urine.        HD10- Continue ERAS diet protocol. Mariscal placed in the OR after the pt voided just prior to arriving to the OR - 1200 cc drained upon insertion. Mariscal management as per Urology - I d/w Dr Goldberg, who wants to keep the Mariscal in given high risk of urinary retention and recurrent UTI if the Mariscal is removed at this time.     Complete ABX for the UTI with recent sepsis as per ID.        HD11- repeat cultures neg  from 4/5        HD12- Patient seen and examined on morning rounds. No acute events overnight. Pain well controlled. No nausea / vomiting.  Tolerating LRD. Abx to stop today.     Anemia, d/t the colon malignancy, preexistent prior to the surgery. Will give iron transfusion prior to discharge.    At the time of discharge, the patient was hemodynamically stable, was tolerating PO diet, was voiding urine and passing stool, was ambulating, and was comfortable with adequate pain control. The patient was instructed to follow up with Dr. Khoury and Dr. Goldberg within 1-2 weeks after discharge from the hospital. The patient/family felt comfortable with discharge. The patient was discharged to home with mariscal. The patient had no other issues.

## 2019-04-22 LAB — SURGICAL PATHOLOGY STUDY: SIGNIFICANT CHANGE UP

## 2019-04-24 ENCOUNTER — APPOINTMENT (OUTPATIENT)
Dept: SURGERY | Facility: CLINIC | Age: 74
End: 2019-04-24
Payer: MEDICARE

## 2019-04-24 VITALS
SYSTOLIC BLOOD PRESSURE: 167 MMHG | OXYGEN SATURATION: 99 % | TEMPERATURE: 98.4 F | BODY MASS INDEX: 21.19 KG/M2 | HEART RATE: 87 BPM | HEIGHT: 67 IN | WEIGHT: 135 LBS | DIASTOLIC BLOOD PRESSURE: 78 MMHG | RESPIRATION RATE: 17 BRPM

## 2019-04-24 DIAGNOSIS — Z87.891 PERSONAL HISTORY OF NICOTINE DEPENDENCE: ICD-10-CM

## 2019-04-24 DIAGNOSIS — D49.0 NEOPLASM OF UNSPECIFIED BEHAVIOR OF DIGESTIVE SYSTEM: ICD-10-CM

## 2019-04-24 PROCEDURE — 99024 POSTOP FOLLOW-UP VISIT: CPT

## 2019-04-25 ENCOUNTER — TRANSCRIPTION ENCOUNTER (OUTPATIENT)
Age: 74
End: 2019-04-25

## 2019-04-25 PROBLEM — D49.0 CECAL NEOPLASM: Status: RESOLVED | Noted: 2019-04-01 | Resolved: 2019-04-25

## 2019-04-25 NOTE — PHYSICAL EXAM
[FreeTextEntry1] : Abdomen soft, non-tender, non-distended. Port incisions and midline extraction wound healing well. No hernias.

## 2019-04-25 NOTE — HISTORY OF PRESENT ILLNESS
[FreeTextEntry1] : 74 y/o male here for post op visit. He was admitted to the hospital with SBO and found on CT to have an obstructing mass at the cecum. Was scheduled to have an urgent resection, however he developed sepsis from UTI d/t urinary retention (present on admission) and had bacteremia. Meanwhile his SBO resolved. He was treated with ABX and his sepsis resolved. He tolerated bowel prep and underwent colonoscopy with removal of 3 polyps (Path: TAs) and findings of large mass in the cecum on 4/9. Later the same day he underwent laparoscopic right colectomy. His recovery was uneventful. He was discharged home with the Coronado given the  issues above and saw Dr Goldberg in the office few days ago; Coronado was removed, he has a f/u apt next week. \par Today the patient reports feeling well. Denies pain. Tolerating po well. BM are daily and soft and sometimes he has an episode of diarrhea/day in addition. 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 all margins of resection are neg.; one of 15 l.n with  metastatic carcinoma; AJCC stage pT3 N1a.  \par

## 2019-04-25 NOTE — REASON FOR VISIT
[Post Op: _________] : a [unfilled] post op visit [Family Member] : family member [FreeTextEntry1] : Laparoscopic right colectomy 04/09/19

## 2019-04-25 NOTE — ASSESSMENT
[FreeTextEntry1] : Doing well postop.\par Path discussed. Colon Ca Stage III (T3N1M0). \par Recommended Oncology evaluation at the [facility], provided phone number - I will email the Oncology team so they can reach out to the daughter to schedule apt. Cecilia Tavarez [phone]. The pt at this time says he will see the Oncologist but is very hesitant to have chemotherapy.\par Instructions for diet, activity, wound care given, all questions answered.\par He needs f/u colonoscopy in 6 months.\par Educated his daughter that she and her siblings need a colonoscopy (she is 40). \par RTO in 10-14 days.\par \par \par \par

## 2019-05-08 ENCOUNTER — APPOINTMENT (OUTPATIENT)
Dept: SURGERY | Facility: CLINIC | Age: 74
End: 2019-05-08
Payer: MEDICARE

## 2019-05-08 ENCOUNTER — OUTPATIENT (OUTPATIENT)
Dept: OUTPATIENT SERVICES | Facility: HOSPITAL | Age: 74
LOS: 1 days | Discharge: HOME | End: 2019-05-08

## 2019-05-08 VITALS
RESPIRATION RATE: 16 BRPM | OXYGEN SATURATION: 99 % | TEMPERATURE: 98.1 F | HEART RATE: 74 BPM | DIASTOLIC BLOOD PRESSURE: 83 MMHG | SYSTOLIC BLOOD PRESSURE: 165 MMHG | BODY MASS INDEX: 21.19 KG/M2 | WEIGHT: 135 LBS | HEIGHT: 67 IN

## 2019-05-08 DIAGNOSIS — C18.9 MALIGNANT NEOPLASM OF COLON, UNSPECIFIED: ICD-10-CM

## 2019-05-08 PROCEDURE — 99024 POSTOP FOLLOW-UP VISIT: CPT

## 2019-05-09 ENCOUNTER — APPOINTMENT (OUTPATIENT)
Dept: HEMATOLOGY ONCOLOGY | Facility: CLINIC | Age: 74
End: 2019-05-09
Payer: MEDICARE

## 2019-05-09 VITALS
SYSTOLIC BLOOD PRESSURE: 174 MMHG | OXYGEN SATURATION: 98 % | HEART RATE: 77 BPM | BODY MASS INDEX: 21.69 KG/M2 | RESPIRATION RATE: 16 BRPM | WEIGHT: 134.99 LBS | DIASTOLIC BLOOD PRESSURE: 83 MMHG | TEMPERATURE: 98 F | HEIGHT: 66.14 IN

## 2019-05-09 DIAGNOSIS — Z80.3 FAMILY HISTORY OF MALIGNANT NEOPLASM OF BREAST: ICD-10-CM

## 2019-05-09 PROCEDURE — 99205 OFFICE O/P NEW HI 60 MIN: CPT

## 2019-05-21 ENCOUNTER — APPOINTMENT (OUTPATIENT)
Dept: HEMATOLOGY ONCOLOGY | Facility: CLINIC | Age: 74
End: 2019-05-21

## 2019-05-21 ENCOUNTER — RESULT REVIEW (OUTPATIENT)
Age: 74
End: 2019-05-21

## 2019-05-21 LAB
BASOPHILS # BLD AUTO: 0 K/UL — SIGNIFICANT CHANGE UP (ref 0–0.2)
BASOPHILS NFR BLD AUTO: 0.4 % — SIGNIFICANT CHANGE UP (ref 0–2)
EOSINOPHIL # BLD AUTO: 0.2 K/UL — SIGNIFICANT CHANGE UP (ref 0–0.5)
EOSINOPHIL NFR BLD AUTO: 3.3 % — SIGNIFICANT CHANGE UP (ref 0–6)
HCT VFR BLD CALC: 29.7 % — LOW (ref 39–50)
HGB BLD-MCNC: 9.3 G/DL — LOW (ref 13–17)
LYMPHOCYTES # BLD AUTO: 2.9 K/UL — SIGNIFICANT CHANGE UP (ref 1–3.3)
LYMPHOCYTES # BLD AUTO: 40.1 % — SIGNIFICANT CHANGE UP (ref 13–44)
MCHC RBC-ENTMCNC: 20.3 PG — LOW (ref 27–34)
MCHC RBC-ENTMCNC: 31.4 G/DL — LOW (ref 32–36)
MCV RBC AUTO: 64.6 FL — LOW (ref 80–100)
MONOCYTES # BLD AUTO: 0.5 K/UL — SIGNIFICANT CHANGE UP (ref 0–0.9)
MONOCYTES NFR BLD AUTO: 6.3 % — SIGNIFICANT CHANGE UP (ref 2–14)
NEUTROPHILS # BLD AUTO: 3.6 K/UL — SIGNIFICANT CHANGE UP (ref 1.8–7.4)
NEUTROPHILS NFR BLD AUTO: 49.9 % — SIGNIFICANT CHANGE UP (ref 43–77)
PLATELET # BLD AUTO: 295 K/UL — SIGNIFICANT CHANGE UP (ref 150–400)
RBC # BLD: 4.6 M/UL — SIGNIFICANT CHANGE UP (ref 4.2–5.8)
RBC # FLD: 18.9 % — HIGH (ref 10.3–14.5)
WBC # BLD: 7.3 K/UL — SIGNIFICANT CHANGE UP (ref 3.8–10.5)
WBC # FLD AUTO: 7.3 K/UL — SIGNIFICANT CHANGE UP (ref 3.8–10.5)

## 2019-05-21 NOTE — HISTORY OF PRESENT ILLNESS
[FreeTextEntry1] : 74 y/o male here for post op visit. He was admitted to the hospital with SBO and found on CT to have an obstructing mass at the cecum. Was scheduled to have an urgent resection, however he developed sepsis from UTI d/t urinary retention (present on admission) and had bacteremia. Meanwhile his SBO resolved. He was treated with ABX and his sepsis resolved. He tolerated bowel prep and underwent colonoscopy with removal of 3 polyps (Path: TAs) and findings of large mass in the cecum on 4/9. Later the same day he underwent laparoscopic right colectomy. Path: stage III (T3N1M0). \par Today the patient reports feeling well. Has some incisional pain, mild. Tolerating po well. BM is soft stool about 1-2 times daily. \par \par

## 2019-05-21 NOTE — PHYSICAL EXAM
[FreeTextEntry1] : Abdomen soft, non-tender, non-distended. Port incisions and midline extraction wound have healed well. No hernias.

## 2019-05-21 NOTE — ASSESSMENT
[FreeTextEntry1] : Doing well postop.\par Instructions for diet (transition to high fiber diet), activity given, all questions answered.\par He needs f/u colonoscopy in 6 months (or few months later if he agrees to chemo).\par Awaiting Oncology consultation. \par RTO in 3 months.\par \par \par \par

## 2019-05-22 LAB
ALBUMIN SERPL ELPH-MCNC: 4.4 G/DL
ALP BLD-CCNC: 75 U/L
ALT SERPL-CCNC: 11 U/L
ANION GAP SERPL CALC-SCNC: 10 MMOL/L
APTT BLD: 31.8 SEC
AST SERPL-CCNC: 10 U/L
BILIRUB SERPL-MCNC: 0.2 MG/DL
BUN SERPL-MCNC: 19 MG/DL
CALCIUM SERPL-MCNC: 9.6 MG/DL
CEA SERPL-MCNC: 1.3 NG/ML
CHLORIDE SERPL-SCNC: 105 MMOL/L
CO2 SERPL-SCNC: 27 MMOL/L
CREAT SERPL-MCNC: 1.48 MG/DL
GLUCOSE SERPL-MCNC: 103 MG/DL
HBV CORE IGG+IGM SER QL: REACTIVE
HBV CORE IGM SER QL: NONREACTIVE
HBV SURFACE AB SER QL: NONREACTIVE
HBV SURFACE AG SER QL: NONREACTIVE
HCV AB SER QL: NONREACTIVE
HCV S/CO RATIO: 0.06 S/CO
INR PPP: 0.92 RATIO
POTASSIUM SERPL-SCNC: 4.9 MMOL/L
PROT SERPL-MCNC: 6.9 G/DL
PT BLD: 10.6 SEC
SODIUM SERPL-SCNC: 142 MMOL/L

## 2019-05-23 ENCOUNTER — APPOINTMENT (OUTPATIENT)
Dept: ENDOVASCULAR SURGERY | Facility: CLINIC | Age: 74
End: 2019-05-23
Payer: MEDICARE

## 2019-05-23 VITALS
HEIGHT: 66 IN | DIASTOLIC BLOOD PRESSURE: 75 MMHG | BODY MASS INDEX: 21.69 KG/M2 | WEIGHT: 135 LBS | OXYGEN SATURATION: 99 % | RESPIRATION RATE: 15 BRPM | SYSTOLIC BLOOD PRESSURE: 158 MMHG | TEMPERATURE: 97.6 F | HEART RATE: 73 BPM

## 2019-05-23 PROCEDURE — 36561 INSERT TUNNELED CV CATH: CPT

## 2019-05-23 PROCEDURE — 77001 FLUOROGUIDE FOR VEIN DEVICE: CPT

## 2019-05-23 PROCEDURE — 76937 US GUIDE VASCULAR ACCESS: CPT

## 2019-05-23 RX ORDER — CAPECITABINE 500 MG/1
500 TABLET ORAL
Qty: 84 | Refills: 3 | Status: DISCONTINUED | COMMUNITY
Start: 2019-05-21 | End: 2019-05-23

## 2019-05-23 RX ORDER — PANTOPRAZOLE 40 MG/1
40 TABLET, DELAYED RELEASE ORAL
Refills: 0 | Status: DISCONTINUED | COMMUNITY
End: 2019-05-23

## 2019-05-23 NOTE — CONSULT LETTER
[Consult Letter:] : I had the pleasure of evaluating your patient, [unfilled]. [Dear  ___] : Dear  [unfilled], [Please see my note below.] : Please see my note below. [Sincerely,] : Sincerely, [Consult Closing:] : Thank you very much for allowing me to participate in the care of this patient.  If you have any questions, please do not hesitate to contact me. [DrAshley  ___] : Dr. WHITE [FreeTextEntry2] : Dr. Sarah Painter [FreeTextEntry3] : Brandon Delcid M.D. \par [facility]\par [facility] \par Professor of Medicine\par [facility]

## 2019-05-23 NOTE — HISTORY OF PRESENT ILLNESS
[FreeTextEntry1] : alert and oriented x 3\par wife will  the patient \par no reported falls\par no medications today [FreeTextEntry4] : n/a [FreeTextEntry5] : yesterday [FreeTextEntry6] : Dr. [place]

## 2019-05-23 NOTE — ASSESSMENT
[FreeTextEntry1] : Chart reviewed, patient assessed, consent obtained for port with risks, benefits explained.

## 2019-05-23 NOTE — REVIEW OF SYSTEMS
[Fatigue] : fatigue [Abdominal Pain] : abdominal pain [Negative] : ENT [Fever] : no fever [Chills] : no chills [Night Sweats] : no night sweats [Recent Change In Weight] : ~T no recent weight change [Vomiting] : no vomiting [Constipation] : no constipation [Diarrhea] : no diarrhea [FreeTextEntry2] : Fatigue increased from February [FreeTextEntry7] : + abdominal pain on and off which started February and severe at end of March

## 2019-05-23 NOTE — ASSESSMENT
[Curative] : Goals of care discussed with patient: Curative [Palliative Care Plan] : not applicable at this time [FreeTextEntry1] : A discussion took place with the patient and his daughter regarding further management. The patient has evidence of stage III colon carcinoma with high risk features including poorly differentiated cells, lymph vascular invasion and one positive lymph node. He also has evidence of MSI-H which potentially would affect efficacy of certain adjuvant treatments. Since he has stage III disease he would be a candidate for adjuvant chemotherapy but because of the MSI-H findings his tumor may not respond to single agent 5-FU or Xeloda.. As a result we discussed the use of FOLFOX chemotherapy with infusion 5-FU, oxaliplatin and leucovorin given over 3 days every 2 weeks and treatment lasting at least 3 months but with a recommendation of attempting to complete 6 months of therapy if tolerated.\par \par Side effects of treatment were discussed in detail including nausea, vomiting, hair loss, nate blood counts and life-threatening infection, memory changes, tearing of the eyes, mouth sores, diarrhea, fatigue, hand-foot irritation, cold intolerance, neuropathy, change in taste and other side effects. We also discussed the use of oxaliplatin and Xeloda, Xelox which the patient will consider. He'll also require screening for hepatitis and Mediport placement. The patient's daughter also asked if the treatment could be stopped after 3 months and this will be reevaluated at that time. The tumor will also be tested for BRAF mutation which would negate the chance of him having Merlos syndrome. In the absence of this mutation then further genetic testing would be recommended. It is anticipated the patient will tolerate his treatment well.

## 2019-05-23 NOTE — PAST MEDICAL HISTORY
[Increasing age ( >40 years old)] : Increasing age ( >40 years old) [Malignancy] : Malignancy [No therapy indicated for cases scheduled for less than one hour] : No therapy indicated for cases scheduled for less than one hour. [FreeTextEntry1] : Malignant Hyperthermia Screening Tool and Risk of Bleeding Assessment \par \par \par \par Mr. EMILIO MULLER denies family of unexpected death following Anesthesia or Exercise.\par Denies Family history of Malignant Hyperthermia, Muscle or Neuromuscular disorder and High Temperature  following exercise.\par \par Mr. EMILIO MULLER denies history of Muscle Spasm, Dark or Chocolate- Colored and Unanticipated fever immediately following anaesthesia or serious exercise.\par Mr. EMILIO MULLER also denies bleeding tendencies/Risks of Bleeding.\par

## 2019-05-23 NOTE — REASON FOR VISIT
[Initial Consultation] : an initial consultation [Family Member] : family member [FreeTextEntry2] : colon ca

## 2019-05-23 NOTE — HISTORY OF PRESENT ILLNESS
[Disease: _____________________] : Disease: [unfilled] [N: ___] : N[unfilled] [M: ___] : M[unfilled] [T: ___] : T[unfilled] [AJCC Stage: ____] : AJCC Stage: [unfilled] [de-identified] : This is a 73-year-old man with history of colon carcinoma. The patient states his history began on February 16, 2019 when he noted the onset of abdominal pain which lasted 10 days and improved with Tylenol. On March 31, 2019 the pain recurred requiring him to go to the emergency room in [facility]. On April 1 the patient underwent abdominal pelvic CAT scan which revealed a small bowel obstruction secondary to a 5 cm cecal mass at the level of the ileocecal valve. There were also mesenteric lymph nodes measuring 1.1 cm along with trace pelvic ascites. A CAT scan of the chest revealed no evidence of metastases. The patient underwent a colonoscopy on April 9 which revealed a fungating partially obstructing large mass in the cecum. The mass was circumferential. There were also subcentimeter polyps which on biopsy were tubular adenomas involving the sigmoid, rectum and proximal transverse colon. \par \par The patient underwent right hemicolectomy involving the right colon, cecum and terminal ileum on April 9, 2019. 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 All margins of resection were negative. There was one of 15 lymph nodes revealing metastatic carcinoma (1/15). The tumor stage was T3 N1a. The tumor measured 7.5 x 3.5 cm. 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 Lymphovascular invasion was present but perineural invasion and tumor deposits were absent. 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 This would indicate the possibility of Merlos syndrome versus BRAF mutation which will be tested. \par \par The patient did well postoperatively and now presents for evaluation for further treatment.The patient denies previous GI disease and he has not had prior colonoscopy. His family history is positive for breast cancer in a sister at age 55. The patient has worked as an . 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

## 2019-05-23 NOTE — PROCEDURE
[D/C IV on discharge] : D/C IV on discharge [Resume diet] : resume diet [Site check for bleeding/hematoma] : Site check for bleeding/hematoma [Vital signs on admission the q 15 mins x2] : Vital signs on admission the q 15 mins x2 [FreeTextEntry1] : right chest wall mediport insertion

## 2019-05-24 ENCOUNTER — APPOINTMENT (OUTPATIENT)
Dept: INFUSION THERAPY | Facility: HOSPITAL | Age: 74
End: 2019-05-24

## 2019-05-24 ENCOUNTER — LABORATORY RESULT (OUTPATIENT)
Age: 74
End: 2019-05-24

## 2019-05-28 DIAGNOSIS — R11.2 NAUSEA WITH VOMITING, UNSPECIFIED: ICD-10-CM

## 2019-05-28 DIAGNOSIS — Z51.11 ENCOUNTER FOR ANTINEOPLASTIC CHEMOTHERAPY: ICD-10-CM

## 2019-06-03 NOTE — DISCHARGE NOTE NURSING/CASE MANAGEMENT/SOCIAL WORK - NSTRANSFERBELONGINGSRESP_GEN_A_NUR
Pt resting in bed. NC O2 at 4L. 24 resp./min. Jugular neck IV infusing. Respirations present. Breath sounds rales and rhonchi. Call light within reach. Bed low and locked with safety measures in place. yes

## 2019-06-06 ENCOUNTER — OUTPATIENT (OUTPATIENT)
Dept: OUTPATIENT SERVICES | Facility: HOSPITAL | Age: 74
LOS: 1 days | Discharge: HOME | End: 2019-06-06

## 2019-06-06 DIAGNOSIS — C18.9 MALIGNANT NEOPLASM OF COLON, UNSPECIFIED: ICD-10-CM

## 2019-06-10 ENCOUNTER — RESULT REVIEW (OUTPATIENT)
Age: 74
End: 2019-06-10

## 2019-06-10 ENCOUNTER — APPOINTMENT (OUTPATIENT)
Dept: HEMATOLOGY ONCOLOGY | Facility: CLINIC | Age: 74
End: 2019-06-10
Payer: MEDICARE

## 2019-06-10 VITALS
BODY MASS INDEX: 21.95 KG/M2 | SYSTOLIC BLOOD PRESSURE: 170 MMHG | WEIGHT: 136 LBS | RESPIRATION RATE: 16 BRPM | DIASTOLIC BLOOD PRESSURE: 77 MMHG | OXYGEN SATURATION: 98 % | HEART RATE: 69 BPM | TEMPERATURE: 98 F

## 2019-06-10 LAB
BASOPHILS # BLD AUTO: 0 K/UL — SIGNIFICANT CHANGE UP (ref 0–0.2)
BASOPHILS NFR BLD AUTO: 0.9 % — SIGNIFICANT CHANGE UP (ref 0–2)
EOSINOPHIL # BLD AUTO: 0.3 K/UL — SIGNIFICANT CHANGE UP (ref 0–0.5)
EOSINOPHIL NFR BLD AUTO: 4.8 % — SIGNIFICANT CHANGE UP (ref 0–6)
HCT VFR BLD CALC: 33 % — LOW (ref 39–50)
HGB BLD-MCNC: 10.2 G/DL — LOW (ref 13–17)
LYMPHOCYTES # BLD AUTO: 2.8 K/UL — SIGNIFICANT CHANGE UP (ref 1–3.3)
LYMPHOCYTES # BLD AUTO: 52.5 % — HIGH (ref 13–44)
MCHC RBC-ENTMCNC: 20.7 PG — LOW (ref 27–34)
MCHC RBC-ENTMCNC: 30.9 G/DL — LOW (ref 32–36)
MCV RBC AUTO: 67 FL — LOW (ref 80–100)
MONOCYTES # BLD AUTO: 0.6 K/UL — SIGNIFICANT CHANGE UP (ref 0–0.9)
MONOCYTES NFR BLD AUTO: 12 % — SIGNIFICANT CHANGE UP (ref 2–14)
NEUTROPHILS # BLD AUTO: 1.6 K/UL — LOW (ref 1.8–7.4)
NEUTROPHILS NFR BLD AUTO: 29.8 % — LOW (ref 43–77)
PLATELET # BLD AUTO: 229 K/UL — SIGNIFICANT CHANGE UP (ref 150–400)
RBC # BLD: 4.92 M/UL — SIGNIFICANT CHANGE UP (ref 4.2–5.8)
RBC # FLD: 24.1 % — HIGH (ref 10.3–14.5)
WBC # BLD: 5.4 K/UL — SIGNIFICANT CHANGE UP (ref 3.8–10.5)
WBC # FLD AUTO: 5.4 K/UL — SIGNIFICANT CHANGE UP (ref 3.8–10.5)

## 2019-06-10 PROCEDURE — 99213 OFFICE O/P EST LOW 20 MIN: CPT

## 2019-06-10 NOTE — HISTORY OF PRESENT ILLNESS
[Disease: _____________________] : Disease: [unfilled] [T: ___] : T[unfilled] [N: ___] : N[unfilled] [M: ___] : M[unfilled] [AJCC Stage: ____] : AJCC Stage: [unfilled] [3 - Distress Level] : Distress Level: 3 [Date: ____________] : Patient's last distress assessment performed on [unfilled]. [de-identified] : This is a 73-year-old man with history of colon carcinoma. The patient states his history began on February 16, 2019 when he noted the onset of abdominal pain which lasted 10 days and improved with Tylenol. On March 31, 2019 the pain recurred requiring him to go to the emergency room in [facility]. On April 1 the patient underwent abdominal pelvic CAT scan which revealed a small bowel obstruction secondary to a 5 cm cecal mass at the level of the ileocecal valve. There were also mesenteric lymph nodes measuring 1.1 cm along with trace pelvic ascites. A CAT scan of the chest revealed no evidence of metastases. The patient underwent a colonoscopy on April 9 which revealed a fungating partially obstructing large mass in the cecum. The mass was circumferential. There were also subcentimeter polyps which on biopsy were tubular adenomas involving the sigmoid, rectum and proximal transverse colon. \par \par The patient underwent right hemicolectomy involving the right colon, cecum and terminal ileum on April 9, 2019. 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 All margins of resection were negative. There was one of 15 lymph nodes revealing metastatic carcinoma (1/15). The tumor stage was T3 N1a. The tumor measured 7.5 x 3.5 cm. 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 Lymphovascular invasion was present but perineural invasion and tumor deposits were absent. 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 This would indicate the possibility of Merlos syndrome versus BRAF mutation which will be tested. \par \par The patient did well postoperatively and now presents for evaluation for further treatment.The patient denies previous GI disease and he has not had prior colonoscopy. His family history is positive for breast cancer in a sister at age 55. The patient has worked as an . [de-identified] : Patient is here for f/u. He is s/p C1 Xeloda 3 tab bid x 14 day with D1 Oxaliplatin. Admit that he feels okay, except some fatigue. He had some nausea, and he took Reglan which helped. Also, he had diarrhea x 2; he took Imodium which helped. Denies any mouth sores, or numbness/tingling to hands/feet. 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 [FreeTextEntry7] : Patient has some diarrhea/nausea, but he does not want to see a SW, at this time.

## 2019-06-10 NOTE — CONSULT LETTER
[Dear  ___] : Dear  [unfilled], [Please see my note below.] : Please see my note below. [Consult Letter:] : I had the pleasure of evaluating your patient, [unfilled]. [Consult Closing:] : Thank you very much for allowing me to participate in the care of this patient.  If you have any questions, please do not hesitate to contact me. [Sincerely,] : Sincerely, [DrAshley  ___] : Dr. WHITE [FreeTextEntry3] : Brandon Delcid M.D. \par [facility] and Hematology\par [facility] \par Professor of Medicine\par [facility] [FreeTextEntry2] : Dr. Sarah Painter

## 2019-06-10 NOTE — REVIEW OF SYSTEMS
[Fatigue] : fatigue [Diarrhea] : diarrhea [Negative] : ENT [Fever] : no fever [Chills] : no chills [Night Sweats] : no night sweats [Recent Change In Weight] : ~T no recent weight change [Loss of Hearing] : no loss of hearing [Nosebleeds] : no nosebleeds [Hoarseness] : no hoarseness [Odynophagia] : no odynophagia [Abdominal Pain] : no abdominal pain [FreeTextEntry7] : some nausea

## 2019-06-10 NOTE — ASSESSMENT
[Curative] : Goals of care discussed with patient: Curative [Palliative Care Plan] : not applicable at this time [FreeTextEntry1] : Continue C2 Xeloda 500 mg: 3 tabs bid x 14 days with D1 Oxaliplatin will monitor toxicities/side effects. Labs ordered f/u RTO in 3 weeks.

## 2019-06-10 NOTE — PHYSICAL EXAM
[Normal] : pharynx is unremarkable, moist mucus membrane, no oral lesions [de-identified] : Midabdominal incision well-healed

## 2019-06-12 LAB
ALBUMIN SERPL ELPH-MCNC: 4.7 G/DL
ALP BLD-CCNC: 70 U/L
ALT SERPL-CCNC: 10 U/L
ANION GAP SERPL CALC-SCNC: 12 MMOL/L
AST SERPL-CCNC: 19 U/L
BILIRUB SERPL-MCNC: 0.2 MG/DL
BUN SERPL-MCNC: 18 MG/DL
CALCIUM SERPL-MCNC: 9.6 MG/DL
CEA SERPL-MCNC: 1.9 NG/ML
CHLORIDE SERPL-SCNC: 108 MMOL/L
CO2 SERPL-SCNC: 23 MMOL/L
CREAT SERPL-MCNC: 1.06 MG/DL
GLUCOSE SERPL-MCNC: 94 MG/DL
POTASSIUM SERPL-SCNC: 4.7 MMOL/L
PROT SERPL-MCNC: 6.6 G/DL
SODIUM SERPL-SCNC: 143 MMOL/L

## 2019-06-14 ENCOUNTER — RESULT REVIEW (OUTPATIENT)
Age: 74
End: 2019-06-14

## 2019-06-14 ENCOUNTER — LABORATORY RESULT (OUTPATIENT)
Age: 74
End: 2019-06-14

## 2019-06-14 ENCOUNTER — APPOINTMENT (OUTPATIENT)
Dept: INFUSION THERAPY | Facility: HOSPITAL | Age: 74
End: 2019-06-14

## 2019-06-14 LAB
BASOPHILS # BLD AUTO: 0 K/UL — SIGNIFICANT CHANGE UP (ref 0–0.2)
BASOPHILS # BLD AUTO: 0 K/UL — SIGNIFICANT CHANGE UP (ref 0–0.2)
BASOPHILS NFR BLD AUTO: 0 % — SIGNIFICANT CHANGE UP (ref 0–2)
BASOPHILS NFR BLD AUTO: 0.2 % — SIGNIFICANT CHANGE UP (ref 0–2)
EOSINOPHIL # BLD AUTO: 0.1 K/UL — SIGNIFICANT CHANGE UP (ref 0–0.5)
EOSINOPHIL # BLD AUTO: 0.3 K/UL — SIGNIFICANT CHANGE UP (ref 0–0.5)
EOSINOPHIL NFR BLD AUTO: 1.4 % — SIGNIFICANT CHANGE UP (ref 0–6)
EOSINOPHIL NFR BLD AUTO: 6 % — SIGNIFICANT CHANGE UP (ref 0–6)
HCT VFR BLD CALC: 30.9 % — LOW (ref 39–50)
HCT VFR BLD CALC: 35.8 % — LOW (ref 39–50)
HGB BLD-MCNC: 12 G/DL — LOW (ref 13–17)
HGB BLD-MCNC: 9.9 G/DL — LOW (ref 13–17)
LYMPHOCYTES # BLD AUTO: 1.2 K/UL — SIGNIFICANT CHANGE UP (ref 1–3.3)
LYMPHOCYTES # BLD AUTO: 13.6 % — SIGNIFICANT CHANGE UP (ref 13–44)
LYMPHOCYTES # BLD AUTO: 2.1 K/UL — SIGNIFICANT CHANGE UP (ref 1–3.3)
LYMPHOCYTES # BLD AUTO: 46.7 % — HIGH (ref 13–44)
MCHC RBC-ENTMCNC: 21.5 PG — LOW (ref 27–34)
MCHC RBC-ENTMCNC: 28.8 PG — SIGNIFICANT CHANGE UP (ref 27–34)
MCHC RBC-ENTMCNC: 31.9 G/DL — LOW (ref 32–36)
MCHC RBC-ENTMCNC: 33.5 G/DL — SIGNIFICANT CHANGE UP (ref 32–36)
MCV RBC AUTO: 67.6 FL — LOW (ref 80–100)
MCV RBC AUTO: 85.8 FL — SIGNIFICANT CHANGE UP (ref 80–100)
MONOCYTES # BLD AUTO: 0.3 K/UL — SIGNIFICANT CHANGE UP (ref 0–0.9)
MONOCYTES # BLD AUTO: 0.5 K/UL — SIGNIFICANT CHANGE UP (ref 0–0.9)
MONOCYTES NFR BLD AUTO: 11.6 % — SIGNIFICANT CHANGE UP (ref 2–14)
MONOCYTES NFR BLD AUTO: 3 % — SIGNIFICANT CHANGE UP (ref 2–14)
NEUTROPHILS # BLD AUTO: 1.6 K/UL — LOW (ref 1.8–7.4)
NEUTROPHILS # BLD AUTO: 7.1 K/UL — SIGNIFICANT CHANGE UP (ref 1.8–7.4)
NEUTROPHILS NFR BLD AUTO: 35.5 % — LOW (ref 43–77)
NEUTROPHILS NFR BLD AUTO: 82 % — HIGH (ref 43–77)
PLATELET # BLD AUTO: 175 K/UL — SIGNIFICANT CHANGE UP (ref 150–400)
PLATELET # BLD AUTO: 239 K/UL — SIGNIFICANT CHANGE UP (ref 150–400)
RBC # BLD: 4.17 M/UL — LOW (ref 4.2–5.8)
RBC # BLD: 4.58 M/UL — SIGNIFICANT CHANGE UP (ref 4.2–5.8)
RBC # FLD: 14.5 % — SIGNIFICANT CHANGE UP (ref 10.3–14.5)
RBC # FLD: 24.4 % — HIGH (ref 10.3–14.5)
WBC # BLD: 4.5 K/UL — SIGNIFICANT CHANGE UP (ref 3.8–10.5)
WBC # BLD: 8.6 K/UL — SIGNIFICANT CHANGE UP (ref 3.8–10.5)
WBC # FLD AUTO: 4.5 K/UL — SIGNIFICANT CHANGE UP (ref 3.8–10.5)
WBC # FLD AUTO: 8.6 K/UL — SIGNIFICANT CHANGE UP (ref 3.8–10.5)

## 2019-06-17 DIAGNOSIS — Z51.11 ENCOUNTER FOR ANTINEOPLASTIC CHEMOTHERAPY: ICD-10-CM

## 2019-06-17 DIAGNOSIS — R11.2 NAUSEA WITH VOMITING, UNSPECIFIED: ICD-10-CM

## 2019-07-01 ENCOUNTER — APPOINTMENT (OUTPATIENT)
Dept: HEMATOLOGY ONCOLOGY | Facility: CLINIC | Age: 74
End: 2019-07-01
Payer: MEDICARE

## 2019-07-01 ENCOUNTER — RESULT REVIEW (OUTPATIENT)
Age: 74
End: 2019-07-01

## 2019-07-01 ENCOUNTER — APPOINTMENT (OUTPATIENT)
Dept: HEMATOLOGY ONCOLOGY | Facility: CLINIC | Age: 74
End: 2019-07-01

## 2019-07-01 VITALS
RESPIRATION RATE: 16 BRPM | TEMPERATURE: 97.8 F | OXYGEN SATURATION: 100 % | DIASTOLIC BLOOD PRESSURE: 95 MMHG | BODY MASS INDEX: 22.11 KG/M2 | HEART RATE: 68 BPM | WEIGHT: 137 LBS | SYSTOLIC BLOOD PRESSURE: 165 MMHG

## 2019-07-01 LAB
BASOPHILS # BLD AUTO: 0.1 K/UL — SIGNIFICANT CHANGE UP (ref 0–0.2)
BASOPHILS NFR BLD AUTO: 1.2 % — SIGNIFICANT CHANGE UP (ref 0–2)
EOSINOPHIL # BLD AUTO: 0.2 K/UL — SIGNIFICANT CHANGE UP (ref 0–0.5)
EOSINOPHIL NFR BLD AUTO: 4.7 % — SIGNIFICANT CHANGE UP (ref 0–6)
HCT VFR BLD CALC: 33.4 % — LOW (ref 39–50)
HGB BLD-MCNC: 10 G/DL — LOW (ref 13–17)
LYMPHOCYTES # BLD AUTO: 2.3 K/UL — SIGNIFICANT CHANGE UP (ref 1–3.3)
LYMPHOCYTES # BLD AUTO: 48.4 % — HIGH (ref 13–44)
MCHC RBC-ENTMCNC: 21.4 PG — LOW (ref 27–34)
MCHC RBC-ENTMCNC: 30.1 G/DL — LOW (ref 32–36)
MCV RBC AUTO: 71 FL — LOW (ref 80–100)
MONOCYTES # BLD AUTO: 0.6 K/UL — SIGNIFICANT CHANGE UP (ref 0–0.9)
MONOCYTES NFR BLD AUTO: 13.4 % — SIGNIFICANT CHANGE UP (ref 2–14)
NEUTROPHILS # BLD AUTO: 1.5 K/UL — LOW (ref 1.8–7.4)
NEUTROPHILS NFR BLD AUTO: 32.3 % — LOW (ref 43–77)
PLATELET # BLD AUTO: 177 K/UL — SIGNIFICANT CHANGE UP (ref 150–400)
RBC # BLD: 4.7 M/UL — SIGNIFICANT CHANGE UP (ref 4.2–5.8)
RBC # FLD: 27.9 % — HIGH (ref 10.3–14.5)
WBC # BLD: 4.7 K/UL — SIGNIFICANT CHANGE UP (ref 3.8–10.5)
WBC # FLD AUTO: 4.7 K/UL — SIGNIFICANT CHANGE UP (ref 3.8–10.5)

## 2019-07-01 PROCEDURE — 99214 OFFICE O/P EST MOD 30 MIN: CPT

## 2019-07-01 NOTE — HISTORY OF PRESENT ILLNESS
[Disease: _____________________] : Disease: [unfilled] [T: ___] : T[unfilled] [N: ___] : N[unfilled] [M: ___] : M[unfilled] [AJCC Stage: ____] : AJCC Stage: [unfilled] [de-identified] : Since the last visit the pt had 2 cycles of Folfox and tolerated well but has nausea and occ emesis. Pt has rare diarrhea. Pt has dec appetite but weight is stable. [de-identified] : This is a 73-year-old man with history of colon carcinoma. The patient states his history began on February 16, 2019 when he noted the onset of abdominal pain which lasted 10 days and improved with Tylenol. On March 31, 2019 the pain recurred requiring him to go to the emergency room in [facility]. On April 1 the patient underwent abdominal pelvic CAT scan which revealed a small bowel obstruction secondary to a 5 cm cecal mass at the level of the ileocecal valve. There were also mesenteric lymph nodes measuring 1.1 cm along with trace pelvic ascites. A CAT scan of the chest revealed no evidence of metastases. The patient underwent a colonoscopy on April 9 which revealed a fungating partially obstructing large mass in the cecum. The mass was circumferential. There were also subcentimeter polyps which on biopsy were tubular adenomas involving the sigmoid, rectum and proximal transverse colon. \par \par The patient underwent right hemicolectomy involving the right colon, cecum and terminal ileum on April 9, 2019. 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 All margins of resection were negative. There was one of 15 lymph nodes revealing metastatic carcinoma (1/15). The tumor stage was T3 N1a. The tumor measured 7.5 x 3.5 cm. 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 Lymphovascular invasion was present but perineural invasion and tumor deposits were absent. 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 This would indicate the possibility of Merlos syndrome versus BRAF mutation which will be tested. \par \par The patient did well postoperatively and now presents for evaluation for further treatment.The patient denies previous GI disease and he has not had prior colonoscopy. His family history is positive for breast cancer in a sister at age 55. The patient has worked as an . 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

## 2019-07-01 NOTE — ASSESSMENT
[Curative] : Goals of care discussed with patient: Curative [FreeTextEntry1] : Pt to continue on folfox and be followed for side effects and toxicity. pt to do cycle # 3 july 9th. Pt to take antinausea rx as needed and imodium for occ diarrhea. Pt to maintain calorie intake and weight to be followed. Pt to RTO in 1 month or sooner if needed.Pt to complete 6 cycles of therapy. [Palliative Care Plan] : not applicable at this time

## 2019-07-01 NOTE — HISTORY OF PRESENT ILLNESS
[Disease: _____________________] : Disease: [unfilled] [T: ___] : T[unfilled] [N: ___] : N[unfilled] [M: ___] : M[unfilled] [AJCC Stage: ____] : AJCC Stage: [unfilled] [de-identified] : Since the last visit the pt had 2 cycles of Folfox and tolerated well but has nausea and occ emesis. Pt has rare diarrhea. Pt has dec appetite but weight is stable. [de-identified] : This is a 73-year-old man with history of colon carcinoma. The patient states his history began on February 16, 2019 when he noted the onset of abdominal pain which lasted 10 days and improved with Tylenol. On March 31, 2019 the pain recurred requiring him to go to the emergency room in [facility]. On April 1 the patient underwent abdominal pelvic CAT scan which revealed a small bowel obstruction secondary to a 5 cm cecal mass at the level of the ileocecal valve. There were also mesenteric lymph nodes measuring 1.1 cm along with trace pelvic ascites. A CAT scan of the chest revealed no evidence of metastases. The patient underwent a colonoscopy on April 9 which revealed a fungating partially obstructing large mass in the cecum. The mass was circumferential. There were also subcentimeter polyps which on biopsy were tubular adenomas involving the sigmoid, rectum and proximal transverse colon. \par \par The patient underwent right hemicolectomy involving the right colon, cecum and terminal ileum on April 9, 2019. 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 All margins of resection were negative. There was one of 15 lymph nodes revealing metastatic carcinoma (1/15). The tumor stage was T3 N1a. The tumor measured 7.5 x 3.5 cm. 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 Lymphovascular invasion was present but perineural invasion and tumor deposits were absent. 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 This would indicate the possibility of Merlos syndrome versus BRAF mutation which will be tested. \par \par The patient did well postoperatively and now presents for evaluation for further treatment.The patient denies previous GI disease and he has not had prior colonoscopy. His family history is positive for breast cancer in a sister at age 55. The patient has worked as an . 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

## 2019-07-11 LAB
ALBUMIN SERPL ELPH-MCNC: 4.5 G/DL
ALP BLD-CCNC: 65 U/L
ALT SERPL-CCNC: 14 U/L
ANION GAP SERPL CALC-SCNC: 9 MMOL/L
AST SERPL-CCNC: 18 U/L
BILIRUB SERPL-MCNC: 0.6 MG/DL
BUN SERPL-MCNC: 12 MG/DL
CALCIUM SERPL-MCNC: 9.6 MG/DL
CEA SERPL-MCNC: 1.7 NG/ML
CHLORIDE SERPL-SCNC: 106 MMOL/L
CO2 SERPL-SCNC: 27 MMOL/L
CREAT SERPL-MCNC: 1.06 MG/DL
GLUCOSE SERPL-MCNC: 99 MG/DL
POTASSIUM SERPL-SCNC: 4.4 MMOL/L
PROT SERPL-MCNC: 6.7 G/DL
SODIUM SERPL-SCNC: 142 MMOL/L

## 2019-07-16 ENCOUNTER — OUTPATIENT (OUTPATIENT)
Dept: OUTPATIENT SERVICES | Facility: HOSPITAL | Age: 74
LOS: 1 days | Discharge: HOME | End: 2019-07-16

## 2019-07-16 DIAGNOSIS — C18.9 MALIGNANT NEOPLASM OF COLON, UNSPECIFIED: ICD-10-CM

## 2019-07-19 ENCOUNTER — RESULT REVIEW (OUTPATIENT)
Age: 74
End: 2019-07-19

## 2019-07-19 ENCOUNTER — LABORATORY RESULT (OUTPATIENT)
Age: 74
End: 2019-07-19

## 2019-07-19 ENCOUNTER — APPOINTMENT (OUTPATIENT)
Dept: INFUSION THERAPY | Facility: HOSPITAL | Age: 74
End: 2019-07-19

## 2019-07-19 LAB
BASOPHILS # BLD AUTO: 0 K/UL — SIGNIFICANT CHANGE UP (ref 0–0.2)
BASOPHILS NFR BLD AUTO: 0.4 % — SIGNIFICANT CHANGE UP (ref 0–2)
EOSINOPHIL # BLD AUTO: 0.2 K/UL — SIGNIFICANT CHANGE UP (ref 0–0.5)
EOSINOPHIL NFR BLD AUTO: 2.7 % — SIGNIFICANT CHANGE UP (ref 0–6)
HCT VFR BLD CALC: 35.9 % — LOW (ref 39–50)
HGB BLD-MCNC: 11.1 G/DL — LOW (ref 13–17)
LYMPHOCYTES # BLD AUTO: 2.8 K/UL — SIGNIFICANT CHANGE UP (ref 1–3.3)
LYMPHOCYTES # BLD AUTO: 49.7 % — HIGH (ref 13–44)
MCHC RBC-ENTMCNC: 22.1 PG — LOW (ref 27–34)
MCHC RBC-ENTMCNC: 30.9 G/DL — LOW (ref 32–36)
MCV RBC AUTO: 71.8 FL — LOW (ref 80–100)
MONOCYTES # BLD AUTO: 0.6 K/UL — SIGNIFICANT CHANGE UP (ref 0–0.9)
MONOCYTES NFR BLD AUTO: 10.9 % — SIGNIFICANT CHANGE UP (ref 2–14)
NEUTROPHILS # BLD AUTO: 2.1 K/UL — SIGNIFICANT CHANGE UP (ref 1.8–7.4)
NEUTROPHILS NFR BLD AUTO: 36.1 % — LOW (ref 43–77)
PLATELET # BLD AUTO: 117 K/UL — LOW (ref 150–400)
RBC # BLD: 5 M/UL — SIGNIFICANT CHANGE UP (ref 4.2–5.8)
RBC # FLD: 25.3 % — HIGH (ref 10.3–14.5)
WBC # BLD: 5.7 K/UL — SIGNIFICANT CHANGE UP (ref 3.8–10.5)
WBC # FLD AUTO: 5.7 K/UL — SIGNIFICANT CHANGE UP (ref 3.8–10.5)

## 2019-08-05 ENCOUNTER — APPOINTMENT (OUTPATIENT)
Dept: HEMATOLOGY ONCOLOGY | Facility: CLINIC | Age: 74
End: 2019-08-05
Payer: MEDICARE

## 2019-08-05 ENCOUNTER — RESULT REVIEW (OUTPATIENT)
Age: 74
End: 2019-08-05

## 2019-08-05 VITALS
SYSTOLIC BLOOD PRESSURE: 154 MMHG | WEIGHT: 142.2 LBS | OXYGEN SATURATION: 99 % | BODY MASS INDEX: 22.95 KG/M2 | DIASTOLIC BLOOD PRESSURE: 83 MMHG | RESPIRATION RATE: 16 BRPM | HEART RATE: 70 BPM | TEMPERATURE: 98.2 F

## 2019-08-05 DIAGNOSIS — Z79.899 OTHER LONG TERM (CURRENT) DRUG THERAPY: ICD-10-CM

## 2019-08-05 LAB
BASOPHILS # BLD AUTO: 0 K/UL — SIGNIFICANT CHANGE UP (ref 0–0.2)
BASOPHILS NFR BLD AUTO: 0.8 % — SIGNIFICANT CHANGE UP (ref 0–2)
EOSINOPHIL # BLD AUTO: 0.3 K/UL — SIGNIFICANT CHANGE UP (ref 0–0.5)
EOSINOPHIL NFR BLD AUTO: 5 % — SIGNIFICANT CHANGE UP (ref 0–6)
HCT VFR BLD CALC: 35.9 % — LOW (ref 39–50)
HGB BLD-MCNC: 11.4 G/DL — LOW (ref 13–17)
LYMPHOCYTES # BLD AUTO: 2.7 K/UL — SIGNIFICANT CHANGE UP (ref 1–3.3)
LYMPHOCYTES # BLD AUTO: 46.8 % — HIGH (ref 13–44)
MCHC RBC-ENTMCNC: 23.9 PG — LOW (ref 27–34)
MCHC RBC-ENTMCNC: 31.6 G/DL — LOW (ref 32–36)
MCV RBC AUTO: 75.7 FL — LOW (ref 80–100)
MONOCYTES # BLD AUTO: 0.9 K/UL — SIGNIFICANT CHANGE UP (ref 0–0.9)
MONOCYTES NFR BLD AUTO: 15.9 % — HIGH (ref 2–14)
NEUTROPHILS # BLD AUTO: 1.8 K/UL — SIGNIFICANT CHANGE UP (ref 1.8–7.4)
NEUTROPHILS NFR BLD AUTO: 31.5 % — LOW (ref 43–77)
PLATELET # BLD AUTO: 213 K/UL — SIGNIFICANT CHANGE UP (ref 150–400)
RBC # BLD: 4.74 M/UL — SIGNIFICANT CHANGE UP (ref 4.2–5.8)
RBC # FLD: 28.3 % — HIGH (ref 10.3–14.5)
WBC # BLD: 5.7 K/UL — SIGNIFICANT CHANGE UP (ref 3.8–10.5)
WBC # FLD AUTO: 5.7 K/UL — SIGNIFICANT CHANGE UP (ref 3.8–10.5)

## 2019-08-05 PROCEDURE — 99214 OFFICE O/P EST MOD 30 MIN: CPT

## 2019-08-05 NOTE — HISTORY OF PRESENT ILLNESS
[Disease: _____________________] : Disease: [unfilled] [T: ___] : T[unfilled] [N: ___] : N[unfilled] [M: ___] : M[unfilled] [AJCC Stage: ____] : AJCC Stage: [unfilled] [de-identified] : This is a 73-year-old man with history of colon carcinoma. The patient states his history began on February 16, 2019 when he noted the onset of abdominal pain which lasted 10 days and improved with Tylenol. On March 31, 2019 the pain recurred requiring him to go to the emergency room in [facility]. On April 1 the patient underwent abdominal pelvic CAT scan which revealed a small bowel obstruction secondary to a 5 cm cecal mass at the level of the ileocecal valve. There were also mesenteric lymph nodes measuring 1.1 cm along with trace pelvic ascites. A CAT scan of the chest revealed no evidence of metastases. The patient underwent a colonoscopy on April 9 which revealed a fungating partially obstructing large mass in the cecum. The mass was circumferential. There were also subcentimeter polyps which on biopsy were tubular adenomas involving the sigmoid, rectum and proximal transverse colon. \par \par The patient underwent right hemicolectomy involving the right colon, cecum and terminal ileum on April 9, 2019. 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 All margins of resection were negative. There was one of 15 lymph nodes revealing metastatic carcinoma (1/15). The tumor stage was T3 N1a. The tumor measured 7.5 x 3.5 cm. 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 Lymphovascular invasion was present but perineural invasion and tumor deposits were absent. 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 This would indicate the possibility of Merlos syndrome versus BRAF mutation which will be tested. \par \par The patient did well postoperatively and now presents for evaluation for further treatment.The patient denies previous GI disease and he has not had prior colonoscopy. His family history is positive for breast cancer in a sister at age 55. The patient has worked as an . 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 [de-identified] : Patient is S/p C3 Xelox. Patient feels okay except some fatigue and occasional nausea. Appetite is slightly decreased. Denies any abdominal pain, melena or bloody stools. He  has no mouth sore, or numbness/tingling to hands/feet.

## 2019-08-05 NOTE — ASSESSMENT
[Curative] : Goals of care discussed with patient: Curative [Palliative Care Plan] : not applicable at this time [FreeTextEntry1] : Pt to continue C4 Xelox.  Will ollowed for side effects and toxicity. Ordered CT-Scan of abdomen/pelvis/chest to be done 1 month post cycle 4.   Pt to RTO in 2 month then  every 3 months, if scan shows no evidence of recurrent disease.

## 2019-08-07 ENCOUNTER — APPOINTMENT (OUTPATIENT)
Dept: SURGERY | Facility: CLINIC | Age: 74
End: 2019-08-07
Payer: MEDICARE

## 2019-08-07 VITALS
HEART RATE: 76 BPM | DIASTOLIC BLOOD PRESSURE: 84 MMHG | RESPIRATION RATE: 16 BRPM | OXYGEN SATURATION: 98 % | SYSTOLIC BLOOD PRESSURE: 165 MMHG | TEMPERATURE: 98.3 F

## 2019-08-07 DIAGNOSIS — Z85.038 ENCOUNTER FOR FOLLOW-UP EXAMINATION AFTER COMPLETED TREATMENT FOR MALIGNANT NEOPLASM: ICD-10-CM

## 2019-08-07 DIAGNOSIS — Z08 ENCOUNTER FOR FOLLOW-UP EXAMINATION AFTER COMPLETED TREATMENT FOR MALIGNANT NEOPLASM: ICD-10-CM

## 2019-08-07 DIAGNOSIS — Z09 ENCOUNTER FOR FOLLOW-UP EXAMINATION AFTER COMPLETED TREATMENT FOR CONDITIONS OTHER THAN MALIGNANT NEOPLASM: ICD-10-CM

## 2019-08-07 PROCEDURE — 99212 OFFICE O/P EST SF 10 MIN: CPT

## 2019-08-07 RX ORDER — PROCHLORPERAZINE MALEATE 10 MG/1
10 TABLET ORAL EVERY 6 HOURS
Qty: 30 | Refills: 0 | Status: DISCONTINUED | COMMUNITY
Start: 2019-07-01 | End: 2019-08-07

## 2019-08-07 RX ORDER — METOCLOPRAMIDE 10 MG/1
10 TABLET ORAL EVERY 6 HOURS
Qty: 120 | Refills: 3 | Status: DISCONTINUED | COMMUNITY
Start: 2019-05-21 | End: 2019-08-07

## 2019-08-07 RX ORDER — DOXAZOSIN 4 MG/1
4 TABLET ORAL
Refills: 0 | Status: DISCONTINUED | COMMUNITY
End: 2019-08-07

## 2019-08-07 NOTE — PHYSICAL EXAM
[FreeTextEntry1] : Appears well.\par Abdomen soft, non-tender, non-distended. Port incisions and midline extraction wound have healed well. No hernias.

## 2019-08-07 NOTE — HISTORY OF PRESENT ILLNESS
[FreeTextEntry1] : 75 y/o male with h/o obstructing mass at the cecum treated with laparoscopic right colectomy on 4/9/19. \par Path: stage III (T3N1M0). Has been receiving Xeloda.\par Preop colonoscopy with removal of 3 polyps (Path: TAs) and findings of large mass in the cecum on 4/9.  Today the patient reports feeling well. Denies abdominal pain. Tolerating po well.  \par States he will be receiving his chemo C4 soon, then is scheduled for CT at on month after completion, end of September. \par \par

## 2019-08-07 NOTE — ASSESSMENT
[FreeTextEntry1] : Doing well. OMARI.\par Continue chemo. \par Plan for f/u colonoscopy in mid October (as long as he does not require more chemo).   \par I will see him again at the time of the colonoscopy - my office will schedule with his daughter. \par \par \par \par

## 2019-08-09 ENCOUNTER — LABORATORY RESULT (OUTPATIENT)
Age: 74
End: 2019-08-09

## 2019-08-09 ENCOUNTER — RESULT REVIEW (OUTPATIENT)
Age: 74
End: 2019-08-09

## 2019-08-09 ENCOUNTER — APPOINTMENT (OUTPATIENT)
Dept: INFUSION THERAPY | Facility: HOSPITAL | Age: 74
End: 2019-08-09

## 2019-08-09 LAB
BASOPHILS # BLD AUTO: 0.1 K/UL — SIGNIFICANT CHANGE UP (ref 0–0.2)
BASOPHILS NFR BLD AUTO: 1 % — SIGNIFICANT CHANGE UP (ref 0–2)
EOSINOPHIL # BLD AUTO: 0.3 K/UL — SIGNIFICANT CHANGE UP (ref 0–0.5)
EOSINOPHIL NFR BLD AUTO: 5.9 % — SIGNIFICANT CHANGE UP (ref 0–6)
HCT VFR BLD CALC: 35.5 % — LOW (ref 39–50)
HGB BLD-MCNC: 11.5 G/DL — LOW (ref 13–17)
LYMPHOCYTES # BLD AUTO: 2.2 K/UL — SIGNIFICANT CHANGE UP (ref 1–3.3)
LYMPHOCYTES # BLD AUTO: 42.7 % — SIGNIFICANT CHANGE UP (ref 13–44)
MCHC RBC-ENTMCNC: 24.5 PG — LOW (ref 27–34)
MCHC RBC-ENTMCNC: 32.3 G/DL — SIGNIFICANT CHANGE UP (ref 32–36)
MCV RBC AUTO: 75.9 FL — LOW (ref 80–100)
MONOCYTES # BLD AUTO: 0.8 K/UL — SIGNIFICANT CHANGE UP (ref 0–0.9)
MONOCYTES NFR BLD AUTO: 14.8 % — HIGH (ref 2–14)
NEUTROPHILS # BLD AUTO: 1.8 K/UL — SIGNIFICANT CHANGE UP (ref 1.8–7.4)
NEUTROPHILS NFR BLD AUTO: 35.5 % — LOW (ref 43–77)
PLATELET # BLD AUTO: 155 K/UL — SIGNIFICANT CHANGE UP (ref 150–400)
RBC # BLD: 4.68 M/UL — SIGNIFICANT CHANGE UP (ref 4.2–5.8)
RBC # FLD: 27.8 % — HIGH (ref 10.3–14.5)
WBC # BLD: 5.1 K/UL — SIGNIFICANT CHANGE UP (ref 3.8–10.5)
WBC # FLD AUTO: 5.1 K/UL — SIGNIFICANT CHANGE UP (ref 3.8–10.5)

## 2019-08-12 DIAGNOSIS — Z51.11 ENCOUNTER FOR ANTINEOPLASTIC CHEMOTHERAPY: ICD-10-CM

## 2019-08-12 DIAGNOSIS — R11.2 NAUSEA WITH VOMITING, UNSPECIFIED: ICD-10-CM

## 2019-08-16 LAB
ALBUMIN SERPL ELPH-MCNC: 4.3 G/DL
ALP BLD-CCNC: 68 U/L
ALT SERPL-CCNC: 11 U/L
ANION GAP SERPL CALC-SCNC: 11 MMOL/L
AST SERPL-CCNC: 17 U/L
BILIRUB SERPL-MCNC: 0.5 MG/DL
BUN SERPL-MCNC: 14 MG/DL
CALCIUM SERPL-MCNC: 9.6 MG/DL
CEA SERPL-MCNC: 1.7 NG/ML
CHLORIDE SERPL-SCNC: 102 MMOL/L
CO2 SERPL-SCNC: 27 MMOL/L
CREAT SERPL-MCNC: 1.12 MG/DL
GLUCOSE SERPL-MCNC: 97 MG/DL
POTASSIUM SERPL-SCNC: 4.2 MMOL/L
PROT SERPL-MCNC: 6.5 G/DL
SODIUM SERPL-SCNC: 140 MMOL/L

## 2019-09-19 ENCOUNTER — FORM ENCOUNTER (OUTPATIENT)
Age: 74
End: 2019-09-19

## 2019-09-20 ENCOUNTER — OUTPATIENT (OUTPATIENT)
Dept: OUTPATIENT SERVICES | Facility: HOSPITAL | Age: 74
LOS: 1 days | End: 2019-09-20
Payer: MEDICARE

## 2019-09-20 ENCOUNTER — APPOINTMENT (OUTPATIENT)
Dept: CT IMAGING | Facility: CLINIC | Age: 74
End: 2019-09-20
Payer: MEDICARE

## 2019-09-20 DIAGNOSIS — Z08 ENCOUNTER FOR FOLLOW-UP EXAMINATION AFTER COMPLETED TREATMENT FOR MALIGNANT NEOPLASM: ICD-10-CM

## 2019-09-20 PROCEDURE — 74177 CT ABD & PELVIS W/CONTRAST: CPT

## 2019-09-20 PROCEDURE — 74177 CT ABD & PELVIS W/CONTRAST: CPT | Mod: 26

## 2019-09-20 PROCEDURE — 71260 CT THORAX DX C+: CPT | Mod: 26

## 2019-09-20 PROCEDURE — 82565 ASSAY OF CREATININE: CPT

## 2019-09-20 PROCEDURE — 71260 CT THORAX DX C+: CPT

## 2019-09-26 ENCOUNTER — OUTPATIENT (OUTPATIENT)
Dept: OUTPATIENT SERVICES | Facility: HOSPITAL | Age: 74
LOS: 1 days | Discharge: HOME | End: 2019-09-26

## 2019-09-30 ENCOUNTER — APPOINTMENT (OUTPATIENT)
Dept: HEMATOLOGY ONCOLOGY | Facility: CLINIC | Age: 74
End: 2019-09-30
Payer: MEDICARE

## 2019-09-30 VITALS
BODY MASS INDEX: 22.27 KG/M2 | SYSTOLIC BLOOD PRESSURE: 166 MMHG | RESPIRATION RATE: 18 BRPM | WEIGHT: 137.99 LBS | OXYGEN SATURATION: 100 % | TEMPERATURE: 98.1 F | DIASTOLIC BLOOD PRESSURE: 79 MMHG | HEART RATE: 57 BPM

## 2019-09-30 DIAGNOSIS — Z85.038 PERSONAL HISTORY OF OTHER MALIGNANT NEOPLASM OF LARGE INTESTINE: ICD-10-CM

## 2019-09-30 PROCEDURE — 99214 OFFICE O/P EST MOD 30 MIN: CPT

## 2019-10-02 RX ORDER — SODIUM PICOSULFATE, MAGNESIUM OXIDE, AND ANHYDROUS CITRIC ACID 10; 3.5; 12 MG/160ML; G/160ML; G/160ML
10-3.5-12 MG-GM LIQUID ORAL
Qty: 1 | Refills: 0 | Status: ACTIVE | COMMUNITY
Start: 2019-10-02 | End: 1900-01-01

## 2019-10-04 RX ORDER — CAPECITABINE 500 MG/1
500 TABLET ORAL
Qty: 84 | Refills: 3 | Status: DISCONTINUED | COMMUNITY
Start: 2019-06-10 | End: 2019-10-04

## 2019-10-04 NOTE — ASSESSMENT
[Curative] : Goals of care discussed with patient: Curative [Palliative Care Plan] : not applicable at this time [FreeTextEntry1] : Pt completed 4  cycles of Xelox.  CT-scan shows: Small nodularity at the anastomosis, subtle soft tissue nodularity and streaking of the omentum and adjacent right paracolic cutter.  Will repeat CT-Scan in 4 months. Pt is under surveillance. Labs ordered f/u. Pt to RTO  3 month, or sooner if needed.

## 2019-10-04 NOTE — HISTORY OF PRESENT ILLNESS
[Disease: _____________________] : Disease: [unfilled] [T: ___] : T[unfilled] [M: ___] : M[unfilled] [N: ___] : N[unfilled] [AJCC Stage: ____] : AJCC Stage: [unfilled] [de-identified] : This is a 73-year-old man with history of colon carcinoma. The patient states his history began on February 16, 2019 when he noted the onset of abdominal pain which lasted 10 days and improved with Tylenol. On March 31, 2019 the pain recurred requiring him to go to the emergency room in [facility]. On April 1 the patient underwent abdominal pelvic CAT scan which revealed a small bowel obstruction secondary to a 5 cm cecal mass at the level of the ileocecal valve. There were also mesenteric lymph nodes measuring 1.1 cm along with trace pelvic ascites. A CAT scan of the chest revealed no evidence of metastases. The patient underwent a colonoscopy on April 9 which revealed a fungating partially obstructing large mass in the cecum. The mass was circumferential. There were also subcentimeter polyps which on biopsy were tubular adenomas involving the sigmoid, rectum and proximal transverse colon. \par \par The patient underwent right hemicolectomy involving the right colon, cecum and terminal ileum on April 9, 2019. 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 All margins of resection were negative. There was one of 15 lymph nodes revealing metastatic carcinoma (1/15). The tumor stage was T3 N1a. The tumor measured 7.5 x 3.5 cm. 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 Lymphovascular invasion was present but perineural invasion and tumor deposits were absent. 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 This would indicate the possibility of Merlos syndrome versus BRAF mutation which will be tested. \par \par The patient did well postoperatively and now presents for evaluation for further treatment.The patient denies previous GI disease and he has not had prior colonoscopy. His family history is positive for breast cancer in a sister at age 55. The patient has worked as an . 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 [de-identified] : Patient completed 4 cycles Xelox. Patient is here for 6 months f/u. He feels well. Denies any weakness, or fatigue.  Appetite is good. BM's are normal, and he has no abdominal pain. Patient is scheduled for colonoscopy repeat on October 22, 2019.  CT-Scan of abdomen/pelvis/chest was done on 09/20/2019 which shows: Status post interval right hemicolectomy. Small nodularity at the anastomosis, subtle soft tissue nodularity and streaking of the omentum and adjacent right paracolic cutter. These findings may be postoperative in nature. Suggest close follow-up evaluation in 4-6 months time. \par

## 2019-10-10 ENCOUNTER — RESULT REVIEW (OUTPATIENT)
Age: 74
End: 2019-10-10

## 2019-10-10 ENCOUNTER — APPOINTMENT (OUTPATIENT)
Dept: HEMATOLOGY ONCOLOGY | Facility: CLINIC | Age: 74
End: 2019-10-10

## 2019-10-10 LAB
BASOPHILS # BLD AUTO: 0.1 K/UL — SIGNIFICANT CHANGE UP (ref 0–0.2)
BASOPHILS NFR BLD AUTO: 1.1 % — SIGNIFICANT CHANGE UP (ref 0–2)
EOSINOPHIL # BLD AUTO: 0.2 K/UL — SIGNIFICANT CHANGE UP (ref 0–0.5)
EOSINOPHIL NFR BLD AUTO: 3.6 % — SIGNIFICANT CHANGE UP (ref 0–6)
HCT VFR BLD CALC: 45.3 % — SIGNIFICANT CHANGE UP (ref 39–50)
HGB BLD-MCNC: 13.8 G/DL — SIGNIFICANT CHANGE UP (ref 13–17)
INR PPP: 0.95 RATIO
LYMPHOCYTES # BLD AUTO: 3.2 K/UL — SIGNIFICANT CHANGE UP (ref 1–3.3)
LYMPHOCYTES # BLD AUTO: 47.3 % — HIGH (ref 13–44)
MCHC RBC-ENTMCNC: 25 PG — LOW (ref 27–34)
MCHC RBC-ENTMCNC: 30.5 G/DL — LOW (ref 32–36)
MCV RBC AUTO: 81.9 FL — SIGNIFICANT CHANGE UP (ref 80–100)
MONOCYTES # BLD AUTO: 0.5 K/UL — SIGNIFICANT CHANGE UP (ref 0–0.9)
MONOCYTES NFR BLD AUTO: 7.3 % — SIGNIFICANT CHANGE UP (ref 2–14)
NEUTROPHILS # BLD AUTO: 2.8 K/UL — SIGNIFICANT CHANGE UP (ref 1.8–7.4)
NEUTROPHILS NFR BLD AUTO: 40.6 % — LOW (ref 43–77)
PLATELET # BLD AUTO: 161 K/UL — SIGNIFICANT CHANGE UP (ref 150–400)
PT BLD: 10.8 SEC
RBC # BLD: 5.53 M/UL — SIGNIFICANT CHANGE UP (ref 4.2–5.8)
RBC # FLD: 20.8 % — HIGH (ref 10.3–14.5)
WBC # BLD: 6.8 K/UL — SIGNIFICANT CHANGE UP (ref 3.8–10.5)
WBC # FLD AUTO: 6.8 K/UL — SIGNIFICANT CHANGE UP (ref 3.8–10.5)

## 2019-10-14 LAB
ALBUMIN SERPL ELPH-MCNC: 4.8 G/DL
ALP BLD-CCNC: 108 U/L
ALT SERPL-CCNC: 24 U/L
ANION GAP SERPL CALC-SCNC: 10 MMOL/L
APTT BLD: 31.8 SEC
AST SERPL-CCNC: 26 U/L
BILIRUB SERPL-MCNC: 0.6 MG/DL
BUN SERPL-MCNC: 14 MG/DL
CALCIUM SERPL-MCNC: 10.2 MG/DL
CEA SERPL-MCNC: 1.5 NG/ML
CHLORIDE SERPL-SCNC: 103 MMOL/L
CO2 SERPL-SCNC: 28 MMOL/L
CREAT SERPL-MCNC: 1.07 MG/DL
GLUCOSE SERPL-MCNC: 99 MG/DL
MAGNESIUM SERPL-MCNC: 2.2 MG/DL
POTASSIUM SERPL-SCNC: 5.6 MMOL/L
PROT SERPL-MCNC: 7.5 G/DL
SODIUM SERPL-SCNC: 141 MMOL/L

## 2019-10-22 ENCOUNTER — OUTPATIENT (OUTPATIENT)
Dept: OUTPATIENT SERVICES | Facility: HOSPITAL | Age: 74
LOS: 1 days | End: 2019-10-22
Payer: MEDICARE

## 2019-10-22 ENCOUNTER — RESULT REVIEW (OUTPATIENT)
Age: 74
End: 2019-10-22

## 2019-10-22 ENCOUNTER — APPOINTMENT (OUTPATIENT)
Dept: SURGERY | Facility: HOSPITAL | Age: 74
End: 2019-10-22
Payer: MEDICARE

## 2019-10-22 DIAGNOSIS — Z85.038 PERSONAL HISTORY OF OTHER MALIGNANT NEOPLASM OF LARGE INTESTINE: ICD-10-CM

## 2019-10-22 PROCEDURE — 45380 COLONOSCOPY AND BIOPSY: CPT

## 2019-10-22 PROCEDURE — 88305 TISSUE EXAM BY PATHOLOGIST: CPT

## 2019-10-22 PROCEDURE — 88305 TISSUE EXAM BY PATHOLOGIST: CPT | Mod: 26

## 2019-10-22 PROCEDURE — 45380 COLONOSCOPY AND BIOPSY: CPT | Mod: PT

## 2019-10-23 LAB — SURGICAL PATHOLOGY STUDY: SIGNIFICANT CHANGE UP

## 2019-10-24 ENCOUNTER — OUTPATIENT (OUTPATIENT)
Dept: OUTPATIENT SERVICES | Facility: HOSPITAL | Age: 74
LOS: 1 days | End: 2019-10-24
Payer: MEDICARE

## 2019-10-24 DIAGNOSIS — Z85.038 PERSONAL HISTORY OF OTHER MALIGNANT NEOPLASM OF LARGE INTESTINE: ICD-10-CM

## 2019-10-24 PROCEDURE — 36590 REMOVAL TUNNELED CV CATH: CPT

## 2019-10-24 NOTE — PROGRESS NOTE ADULT - SUBJECTIVE AND OBJECTIVE BOX
Interventional Radiology Brief Post-Procedure Note    Procedure: Chest port removal    Operators: Mynor Kirby MD    Anesthesia (type): Provided by the anesthesiology service.    Contrast: None.     EBL: Minimal    Findings/Follow up Plan of Care: Successful chest port removal.     Specimens Removed: Right chest wall port.     Implants: None.     Complications: No immediate complications.     Condition/Disposition: To recovery room.     Please call Interventional Radiology x [phone] with any questions, concerns, or issues.

## 2019-10-25 DIAGNOSIS — C18.9 MALIGNANT NEOPLASM OF COLON, UNSPECIFIED: ICD-10-CM

## 2019-10-28 DIAGNOSIS — Z45.2 ENCOUNTER FOR ADJUSTMENT AND MANAGEMENT OF VASCULAR ACCESS DEVICE: ICD-10-CM

## 2019-10-28 DIAGNOSIS — C19 MALIGNANT NEOPLASM OF RECTOSIGMOID JUNCTION: ICD-10-CM

## 2020-01-12 ENCOUNTER — OUTPATIENT (OUTPATIENT)
Dept: OUTPATIENT SERVICES | Facility: HOSPITAL | Age: 75
LOS: 1 days | Discharge: HOME | End: 2020-01-12

## 2020-01-12 DIAGNOSIS — C18.9 MALIGNANT NEOPLASM OF COLON, UNSPECIFIED: ICD-10-CM

## 2020-01-13 ENCOUNTER — APPOINTMENT (OUTPATIENT)
Dept: HEMATOLOGY ONCOLOGY | Facility: CLINIC | Age: 75
End: 2020-01-13
Payer: MEDICARE

## 2020-01-13 ENCOUNTER — RESULT REVIEW (OUTPATIENT)
Age: 75
End: 2020-01-13

## 2020-01-13 VITALS
BODY MASS INDEX: 22.24 KG/M2 | HEART RATE: 57 BPM | WEIGHT: 137.79 LBS | RESPIRATION RATE: 16 BRPM | OXYGEN SATURATION: 97 % | DIASTOLIC BLOOD PRESSURE: 88 MMHG | TEMPERATURE: 97.5 F | SYSTOLIC BLOOD PRESSURE: 169 MMHG

## 2020-01-13 LAB
BASOPHILS # BLD AUTO: 0 K/UL — SIGNIFICANT CHANGE UP (ref 0–0.2)
BASOPHILS NFR BLD AUTO: 0.5 % — SIGNIFICANT CHANGE UP (ref 0–2)
EOSINOPHIL # BLD AUTO: 0.2 K/UL — SIGNIFICANT CHANGE UP (ref 0–0.5)
EOSINOPHIL NFR BLD AUTO: 2.6 % — SIGNIFICANT CHANGE UP (ref 0–6)
HCT VFR BLD CALC: 44.2 % — SIGNIFICANT CHANGE UP (ref 39–50)
HGB BLD-MCNC: 14.5 G/DL — SIGNIFICANT CHANGE UP (ref 13–17)
LYMPHOCYTES # BLD AUTO: 2.6 K/UL — SIGNIFICANT CHANGE UP (ref 1–3.3)
LYMPHOCYTES # BLD AUTO: 32.1 % — SIGNIFICANT CHANGE UP (ref 13–44)
MCHC RBC-ENTMCNC: 26.3 PG — LOW (ref 27–34)
MCHC RBC-ENTMCNC: 32.8 G/DL — SIGNIFICANT CHANGE UP (ref 32–36)
MCV RBC AUTO: 80.2 FL — SIGNIFICANT CHANGE UP (ref 80–100)
MONOCYTES # BLD AUTO: 0.6 K/UL — SIGNIFICANT CHANGE UP (ref 0–0.9)
MONOCYTES NFR BLD AUTO: 8 % — SIGNIFICANT CHANGE UP (ref 2–14)
NEUTROPHILS # BLD AUTO: 4.6 K/UL — SIGNIFICANT CHANGE UP (ref 1.8–7.4)
NEUTROPHILS NFR BLD AUTO: 56.7 % — SIGNIFICANT CHANGE UP (ref 43–77)
PLATELET # BLD AUTO: 175 K/UL — SIGNIFICANT CHANGE UP (ref 150–400)
RBC # BLD: 5.51 M/UL — SIGNIFICANT CHANGE UP (ref 4.2–5.8)
RBC # FLD: 15.1 % — HIGH (ref 10.3–14.5)
WBC # BLD: 8 K/UL — SIGNIFICANT CHANGE UP (ref 3.8–10.5)
WBC # FLD AUTO: 8 K/UL — SIGNIFICANT CHANGE UP (ref 3.8–10.5)

## 2020-01-13 PROCEDURE — 99214 OFFICE O/P EST MOD 30 MIN: CPT

## 2020-01-19 LAB
ALBUMIN SERPL ELPH-MCNC: 4.6 G/DL
ALP BLD-CCNC: 117 U/L
ALT SERPL-CCNC: 29 U/L
ANION GAP SERPL CALC-SCNC: 11 MMOL/L
AST SERPL-CCNC: 22 U/L
BILIRUB SERPL-MCNC: 0.4 MG/DL
BUN SERPL-MCNC: 14 MG/DL
CALCIUM SERPL-MCNC: 9.2 MG/DL
CEA SERPL-MCNC: 1.3 NG/ML
CHLORIDE SERPL-SCNC: 106 MMOL/L
CO2 SERPL-SCNC: 26 MMOL/L
CREAT SERPL-MCNC: 1.06 MG/DL
GLUCOSE SERPL-MCNC: 84 MG/DL
MAGNESIUM SERPL-MCNC: 2.3 MG/DL
POTASSIUM SERPL-SCNC: 5.2 MMOL/L
PROT SERPL-MCNC: 7 G/DL
SODIUM SERPL-SCNC: 143 MMOL/L

## 2020-08-02 ENCOUNTER — OUTPATIENT (OUTPATIENT)
Dept: OUTPATIENT SERVICES | Facility: HOSPITAL | Age: 75
LOS: 1 days | Discharge: HOME | End: 2020-08-02

## 2020-08-02 DIAGNOSIS — C18.9 MALIGNANT NEOPLASM OF COLON, UNSPECIFIED: ICD-10-CM

## 2020-08-10 ENCOUNTER — RESULT REVIEW (OUTPATIENT)
Age: 75
End: 2020-08-10

## 2020-08-10 ENCOUNTER — APPOINTMENT (OUTPATIENT)
Dept: HEMATOLOGY ONCOLOGY | Facility: CLINIC | Age: 75
End: 2020-08-10
Payer: MEDICARE

## 2020-08-10 VITALS
WEIGHT: 137.99 LBS | HEART RATE: 79 BPM | RESPIRATION RATE: 16 BRPM | SYSTOLIC BLOOD PRESSURE: 175 MMHG | TEMPERATURE: 97.3 F | BODY MASS INDEX: 22.27 KG/M2 | OXYGEN SATURATION: 98 % | DIASTOLIC BLOOD PRESSURE: 88 MMHG

## 2020-08-10 DIAGNOSIS — C18.9 MALIGNANT NEOPLASM OF COLON, UNSPECIFIED: ICD-10-CM

## 2020-08-10 LAB
BASOPHILS # BLD AUTO: 0.05 K/UL — SIGNIFICANT CHANGE UP (ref 0–0.2)
BASOPHILS NFR BLD AUTO: 0.9 % — SIGNIFICANT CHANGE UP (ref 0–2)
EOSINOPHIL # BLD AUTO: 0.21 K/UL — SIGNIFICANT CHANGE UP (ref 0–0.5)
EOSINOPHIL NFR BLD AUTO: 3.6 % — SIGNIFICANT CHANGE UP (ref 0–6)
HCT VFR BLD CALC: 47.4 % — SIGNIFICANT CHANGE UP (ref 39–50)
HGB BLD-MCNC: 16.1 G/DL — SIGNIFICANT CHANGE UP (ref 13–17)
IMM GRANULOCYTES NFR BLD AUTO: 0 % — SIGNIFICANT CHANGE UP (ref 0–1.5)
LYMPHOCYTES # BLD AUTO: 2.74 K/UL — SIGNIFICANT CHANGE UP (ref 1–3.3)
LYMPHOCYTES # BLD AUTO: 47.2 % — HIGH (ref 13–44)
MCHC RBC-ENTMCNC: 29.8 PG — SIGNIFICANT CHANGE UP (ref 27–34)
MCHC RBC-ENTMCNC: 34 GM/DL — SIGNIFICANT CHANGE UP (ref 32–36)
MCV RBC AUTO: 87.6 FL — SIGNIFICANT CHANGE UP (ref 80–100)
MONOCYTES # BLD AUTO: 0.45 K/UL — SIGNIFICANT CHANGE UP (ref 0–0.9)
MONOCYTES NFR BLD AUTO: 7.7 % — SIGNIFICANT CHANGE UP (ref 2–14)
NEUTROPHILS # BLD AUTO: 2.36 K/UL — SIGNIFICANT CHANGE UP (ref 1.8–7.4)
NEUTROPHILS NFR BLD AUTO: 40.6 % — LOW (ref 43–77)
NRBC # BLD: 0 /100 WBCS — SIGNIFICANT CHANGE UP (ref 0–0)
PLATELET # BLD AUTO: 159 K/UL — SIGNIFICANT CHANGE UP (ref 150–400)
RBC # BLD: 5.41 M/UL — SIGNIFICANT CHANGE UP (ref 4.2–5.8)
RBC # FLD: 12.6 % — SIGNIFICANT CHANGE UP (ref 10.3–14.5)
WBC # BLD: 5.81 K/UL — SIGNIFICANT CHANGE UP (ref 3.8–10.5)
WBC # FLD AUTO: 5.81 K/UL — SIGNIFICANT CHANGE UP (ref 3.8–10.5)

## 2020-08-10 PROCEDURE — 99214 OFFICE O/P EST MOD 30 MIN: CPT

## 2020-08-10 NOTE — HISTORY OF PRESENT ILLNESS
[Disease: _____________________] : Disease: [unfilled] [T: ___] : T[unfilled] [N: ___] : N[unfilled] [M: ___] : M[unfilled] [AJCC Stage: ____] : AJCC Stage: [unfilled] [de-identified] : This is a 73-year-old man with history of colon carcinoma. The patient states his history began on February 16, 2019 when he noted the onset of abdominal pain which lasted 10 days and improved with Tylenol. On March 31, 2019 the pain recurred requiring him to go to the emergency room in [facility]. On April 1 the patient underwent abdominal pelvic CAT scan which revealed a small bowel obstruction secondary to a 5 cm cecal mass at the level of the ileocecal valve. There were also mesenteric lymph nodes measuring 1.1 cm along with trace pelvic ascites. A CAT scan of the chest revealed no evidence of metastases. The patient underwent a colonoscopy on April 9 which revealed a fungating partially obstructing large mass in the cecum. The mass was circumferential. There were also subcentimeter polyps which on biopsy were tubular adenomas involving the sigmoid, rectum and proximal transverse colon. \par \par The patient underwent right hemicolectomy involving the right colon, cecum and terminal ileum on April 9, 2019. 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 All margins of resection were negative. There was one of 15 lymph nodes revealing metastatic carcinoma (1/15). The tumor stage was T3 N1a. The tumor measured 7.5 x 3.5 cm. 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 Lymphovascular invasion was present but perineural invasion and tumor deposits were absent. 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 This would indicate the possibility of Merlos syndrome versus BRAF mutation which will be tested. \par \par The patient did well postoperatively and now presents for evaluation for further treatment.The patient denies previous GI disease and he has not had prior colonoscopy. His family history is positive for breast cancer in a sister at age 55. The patient has worked as an . [de-identified] : \par 09/20/2019 CT-Scan of abdomen/pelvis/chest was done which shows: Status post interval right hemicolectomy. Small nodularity at the anastomosis, subtle soft tissue nodularity and streaking of the omentum and adjacent right paracolic cutter. These findings may be postoperative in nature. Suggest close follow-up evaluation in 4-6 months time. \par \par S/p 4 cycles Xelox completed 09/2019. \par \par Patient has returned for 6 months f/u.  He feels well. Denies any weakness, or fatigue. He walks 4 miles daily.  Appetite is good. BM's are normal. Denies any melena or bloody stools, and he has no abdominal pain. Patient had repeat colonoscopy on 10/22/2019 with removal of several polyp. Pathology of one polyps showed Tubular adenoma. He will repeat the colonoscopy in 10/2020. Patient has refused 6 month repeat CT-Scan, and now, he does not want any more scans. Also, patient does not want to return for  any more follow ups.\par  [de-identified] : mod to poorly diff adenoca MLH-1 PMS2 loss of expression, MMR-D, BRAF no mutation.

## 2020-08-10 NOTE — PHYSICAL EXAM
[Restricted in physically strenuous activity but ambulatory and able to carry out work of a light or sedentary nature] : Status 1- Restricted in physically strenuous activity but ambulatory and able to carry out work of a light or sedentary nature, e.g., light house work, office work [Normal] : affect appropriate [de-identified] : + well healed lower abdominal scar.

## 2020-08-10 NOTE — ASSESSMENT
[Palliative Care Plan] : not applicable at this time [Curative] : Goals of care discussed with patient: Curative [FreeTextEntry1] : S/p completed 4 cycles of Xelox.  CT-scan shows: Small nodularity at the anastomosis, subtle soft tissue nodularity and streaking of the omentum and adjacent right paracolic cutter.  A repeat CT-Scan was advised  to done at 4-6 months. Patient was advised that a repeat CT-Scan is now due. Patient does not want any more scan . He does understand the importance of the scan to r/o any recurrent disease, but he still refusing the scan.  Pt is under surveillance, but he does not want to return for any more visits. RTO 6 months advised, pt to schedule when he is ready,

## 2020-08-13 LAB
ALBUMIN SERPL ELPH-MCNC: 4.9 G/DL
ALP BLD-CCNC: 97 U/L
ALT SERPL-CCNC: 21 U/L
ANION GAP SERPL CALC-SCNC: 13 MMOL/L
AST SERPL-CCNC: 25 U/L
BILIRUB SERPL-MCNC: 0.6 MG/DL
BUN SERPL-MCNC: 16 MG/DL
CALCIUM SERPL-MCNC: 9.9 MG/DL
CEA SERPL-MCNC: 1.5 NG/ML
CHLORIDE SERPL-SCNC: 101 MMOL/L
CO2 SERPL-SCNC: 27 MMOL/L
CREAT SERPL-MCNC: 0.96 MG/DL
GLUCOSE SERPL-MCNC: 82 MG/DL
MAGNESIUM SERPL-MCNC: 2.2 MG/DL
POTASSIUM SERPL-SCNC: 4.5 MMOL/L
PROT SERPL-MCNC: 7.2 G/DL
SODIUM SERPL-SCNC: 141 MMOL/L

## 2020-10-11 NOTE — HISTORY OF PRESENT ILLNESS
[T: ___] : T[unfilled] [Disease: _____________________] : Disease: [unfilled] [M: ___] : M[unfilled] [N: ___] : N[unfilled] [AJCC Stage: ____] : AJCC Stage: [unfilled] [de-identified] : This is a 73-year-old man with history of colon carcinoma. The patient states his history began on February 16, 2019 when he noted the onset of abdominal pain which lasted 10 days and improved with Tylenol. On March 31, 2019 the pain recurred requiring him to go to the emergency room in [facility]. On April 1 the patient underwent abdominal pelvic CAT scan which revealed a small bowel obstruction secondary to a 5 cm cecal mass at the level of the ileocecal valve. There were also mesenteric lymph nodes measuring 1.1 cm along with trace pelvic ascites. A CAT scan of the chest revealed no evidence of metastases. The patient underwent a colonoscopy on April 9 which revealed a fungating partially obstructing large mass in the cecum. The mass was circumferential. There were also subcentimeter polyps which on biopsy were tubular adenomas involving the sigmoid, rectum and proximal transverse colon. \par \par The patient underwent right hemicolectomy involving the right colon, cecum and terminal ileum on April 9, 2019. 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 All margins of resection were negative. There was one of 15 lymph nodes revealing metastatic carcinoma (1/15). The tumor stage was T3 N1a. The tumor measured 7.5 x 3.5 cm. 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 Lymphovascular invasion was present but perineural invasion and tumor deposits were absent. 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 This would indicate the possibility of Merlos syndrome versus BRAF mutation which will be tested. \par \par The patient did well postoperatively and now presents for evaluation for further treatment.The patient denies previous GI disease and he has not had prior colonoscopy. His family history is positive for breast cancer in a sister at age 55. The patient has worked as an . 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 [de-identified] : 09/20/2019 CT-Scan of abdomen/pelvis/chest was done which shows: Status post interval right hemicolectomy. Small nodularity at the anastomosis, subtle soft tissue nodularity and streaking of the omentum and adjacent right paracolic cutter. These findings may be postoperative in nature. Suggest close follow-up evaluation in 4-6 months time. \par \par Patient completed 4 cycles Xelox. Patient is here for 6 months f/u. He feels well. Denies any weakness, or fatigue. Appetite is good. BM's are normal, and he has no abdominal pain. Patient is scheduled for colonoscopy repeat on October 22, 2019. CT-Scan of abdomen/pelvis/chest was done on 09/20/2019 which shows: Status post interval right hemicolectomy. Small nodularity at the anastomosis, subtle soft tissue nodularity and streaking of the omentum and adjacent right paracolic cutter. These findings may be postoperative in nature. Suggest close follow-up evaluation in 4-6 months time. \par  \par \par \par \par

## 2020-10-11 NOTE — ASSESSMENT
[Curative] : Goals of care discussed with patient: Curative [Palliative Care Plan] : not applicable at this time [FreeTextEntry1] : S/p completed 4 cycles of Xelox. CT-scan shows: Small nodularity at the anastomosis, subtle soft tissue nodularity and streaking of the omentum and adjacent right paracolic cutter.  A repeat CT-Scan was advised  to done at 4-6 months.  CT-Scan of 6 months is now due. Patient does not want any more scan. He was shown via picture/drawing the importance of doing repeat scans every 6 months for 2 years, but he still is refusing the scans. Dr. Brandon Delcid also spoke with patient, but he still  refuses repeat CT-Scan. Pt is under surveillance, RTO in 6 months.

## 2020-10-11 NOTE — HISTORY OF PRESENT ILLNESS
[T: ___] : T[unfilled] [Disease: _____________________] : Disease: [unfilled] [M: ___] : M[unfilled] [N: ___] : N[unfilled] [AJCC Stage: ____] : AJCC Stage: [unfilled] [de-identified] : This is a 73-year-old man with history of colon carcinoma. The patient states his history began on February 16, 2019 when he noted the onset of abdominal pain which lasted 10 days and improved with Tylenol. On March 31, 2019 the pain recurred requiring him to go to the emergency room in [facility]. On April 1 the patient underwent abdominal pelvic CAT scan which revealed a small bowel obstruction secondary to a 5 cm cecal mass at the level of the ileocecal valve. There were also mesenteric lymph nodes measuring 1.1 cm along with trace pelvic ascites. A CAT scan of the chest revealed no evidence of metastases. The patient underwent a colonoscopy on April 9 which revealed a fungating partially obstructing large mass in the cecum. The mass was circumferential. There were also subcentimeter polyps which on biopsy were tubular adenomas involving the sigmoid, rectum and proximal transverse colon. \par \par The patient underwent right hemicolectomy involving the right colon, cecum and terminal ileum on April 9, 2019. 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 All margins of resection were negative. There was one of 15 lymph nodes revealing metastatic carcinoma (1/15). The tumor stage was T3 N1a. The tumor measured 7.5 x 3.5 cm. 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 Lymphovascular invasion was present but perineural invasion and tumor deposits were absent. 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 This would indicate the possibility of Merlos syndrome versus BRAF mutation which will be tested. \par \par The patient did well postoperatively and now presents for evaluation for further treatment.The patient denies previous GI disease and he has not had prior colonoscopy. His family history is positive for breast cancer in a sister at age 55. The patient has worked as an . 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 [de-identified] : 09/20/2019 CT-Scan of abdomen/pelvis/chest was done which shows: Status post interval right hemicolectomy. Small nodularity at the anastomosis, subtle soft tissue nodularity and streaking of the omentum and adjacent right paracolic cutter. These findings may be postoperative in nature. Suggest close follow-up evaluation in 4-6 months time. \par \par Patient completed 4 cycles Xelox. Patient is here for 6 months f/u. He feels well. Denies any weakness, or fatigue. Appetite is good. BM's are normal, and he has no abdominal pain. Patient is scheduled for colonoscopy repeat on October 22, 2019. CT-Scan of abdomen/pelvis/chest was done on 09/20/2019 which shows: Status post interval right hemicolectomy. Small nodularity at the anastomosis, subtle soft tissue nodularity and streaking of the omentum and adjacent right paracolic cutter. These findings may be postoperative in nature. Suggest close follow-up evaluation in 4-6 months time. \par  \par \par \par \par

## 2022-10-04 DIAGNOSIS — I10 ESSENTIAL (PRIMARY) HYPERTENSION: ICD-10-CM

## 2022-10-04 RX ORDER — AMLODIPINE BESYLATE 5 MG/1
5 TABLET ORAL
Refills: 0 | Status: ACTIVE | COMMUNITY

## 2022-11-07 DIAGNOSIS — Z12.11 ENCOUNTER FOR SCREENING FOR MALIGNANT NEOPLASM OF COLON: ICD-10-CM

## 2022-11-07 RX ORDER — SODIUM PICOSULFATE, MAGNESIUM OXIDE, AND ANHYDROUS CITRIC ACID 10; 3.5; 12 MG/160ML; G/160ML; G/160ML
10-3.5-12 MG-GM LIQUID ORAL
Qty: 1 | Refills: 0 | Status: ACTIVE | COMMUNITY
Start: 2022-11-07 | End: 1900-01-01

## 2022-11-14 LAB — SARS-COV-2 N GENE NPH QL NAA+PROBE: NOT DETECTED

## 2022-11-16 ENCOUNTER — APPOINTMENT (OUTPATIENT)
Dept: SURGERY | Facility: HOSPITAL | Age: 77
End: 2022-11-16

## 2022-11-16 ENCOUNTER — OUTPATIENT (OUTPATIENT)
Dept: OUTPATIENT SERVICES | Facility: HOSPITAL | Age: 77
LOS: 1 days | End: 2022-11-16
Payer: MEDICARE

## 2022-11-16 ENCOUNTER — TRANSCRIPTION ENCOUNTER (OUTPATIENT)
Age: 77
End: 2022-11-16

## 2022-11-16 VITALS
DIASTOLIC BLOOD PRESSURE: 61 MMHG | HEART RATE: 62 BPM | SYSTOLIC BLOOD PRESSURE: 106 MMHG | OXYGEN SATURATION: 96 % | RESPIRATION RATE: 16 BRPM

## 2022-11-16 VITALS
HEIGHT: 67 IN | OXYGEN SATURATION: 97 % | HEART RATE: 76 BPM | WEIGHT: 134.92 LBS | DIASTOLIC BLOOD PRESSURE: 81 MMHG | TEMPERATURE: 98 F | SYSTOLIC BLOOD PRESSURE: 174 MMHG | RESPIRATION RATE: 18 BRPM

## 2022-11-16 DIAGNOSIS — Z08 ENCOUNTER FOR FOLLOW-UP EXAMINATION AFTER COMPLETED TREATMENT FOR MALIGNANT NEOPLASM: ICD-10-CM

## 2022-11-16 PROCEDURE — G0105: CPT

## 2022-11-16 PROCEDURE — 45378 DIAGNOSTIC COLONOSCOPY: CPT

## 2022-11-16 RX ORDER — AMLODIPINE BESYLATE 2.5 MG/1
1 TABLET ORAL
Qty: 0 | Refills: 0 | DISCHARGE

## 2022-11-16 RX ORDER — SODIUM CHLORIDE 9 MG/ML
500 INJECTION INTRAMUSCULAR; INTRAVENOUS; SUBCUTANEOUS
Refills: 0 | Status: COMPLETED | OUTPATIENT
Start: 2022-11-16 | End: 2022-11-16

## 2022-11-16 RX ADMIN — SODIUM CHLORIDE 30 MILLILITER(S): 9 INJECTION INTRAMUSCULAR; INTRAVENOUS; SUBCUTANEOUS at 08:03

## 2022-11-16 NOTE — ASU DISCHARGE PLAN (ADULT/PEDIATRIC) - CARE PROVIDER_API CALL
Sarah Painter)  ColonRectal Surgery; Surgery  [address], [address]  [place] [address]  Phone: [phone]  Fax: [phone]  Established Patient  Follow Up Time:

## 2022-11-16 NOTE — ASU PATIENT PROFILE, ADULT - FALL HARM RISK - UNIVERSAL INTERVENTIONS
Bed in lowest position, wheels locked, appropriate side rails in place/Call bell, personal items and telephone in reach/Instruct patient to call for assistance before getting out of bed or chair/Non-slip footwear when patient is out of bed/[place] to call system/Physically safe environment - no spills, clutter or unnecessary equipment/Purposeful Proactive Rounding/Room/bathroom lighting operational, light cord in reach

## 2022-11-16 NOTE — PRE PROCEDURE NOTE - PRE PROCEDURE EVALUATION
Attending Physician:  CHINO Painter             Procedure: Colonoscopy    Indication for Procedure: Surveillance of colon Ca  ________________________________________________________  PAST MEDICAL & SURGICAL HISTORY:  Colon Ca    Colon resection        ALLERGIES:  No Known Allergies    HOME MEDICATIONS:  Reglan 10 mg oral tablet: 1 tab(s) orally 4 times a day (before meals and at bedtime), As Needed - for nausea      AICD/PPM: [ ] yes   [x ] no    PERTINENT LAB DATA:                      PHYSICAL EXAMINATION:    T(C): --  HR: --  BP: --  RR: --  SpO2: --    Constitutional: NAD  HEENT: PERRLA, EOMI,    Neck:  No JVD  Respiratory: CTAB/L  Cardiovascular: S1 and S2  Gastrointestinal: BS+, soft, NT/ND  Extremities: No peripheral edema  Neurological: A/O x 3, no focal deficits  Psychiatric: Normal mood, normal affect  Skin: No rashes    ASA Class: I [ ]  II [ x]  III [ ]  IV [ ]    COMMENTS:    The patient is a suitable candidate for the planned procedure unless box checked [ ]  No, explain:

## 2023-06-03 NOTE — DISCHARGE NOTE PROVIDER - NSDCQMCOGNITION_NEU_ALL_CORE
[FreeTextEntry1] : Follow-up [de-identified] : Patient presents for follow-up of hypothyroidism diabetes hyperlipidemia and vitamin D deficiency obesity.  Patient admits to taking levothyroxine together with other medications 30 minutes before breakfast.  Otherwise feels well denies weight gain fatigue constipation depression No difficulties

## 2024-10-08 NOTE — ED ADULT NURSE NOTE - NS ED NURSE DISCH DISPOSITION
Try ibuprofen as needed for headache  Continue aspirin 81mg per day and atorvastatin  Continue physical therapy and speech therapy]  Follow up in 4 months   Admitted

## 2025-05-20 ENCOUNTER — NON-APPOINTMENT (OUTPATIENT)
Age: 80
End: 2025-05-20

## 2025-05-27 ENCOUNTER — EMERGENCY (EMERGENCY)
Facility: HOSPITAL | Age: 80
LOS: 1 days | End: 2025-05-27
Attending: EMERGENCY MEDICINE
Payer: MEDICARE

## 2025-05-27 VITALS
TEMPERATURE: 98 F | WEIGHT: 134.92 LBS | HEART RATE: 93 BPM | DIASTOLIC BLOOD PRESSURE: 97 MMHG | RESPIRATION RATE: 18 BRPM | SYSTOLIC BLOOD PRESSURE: 175 MMHG | OXYGEN SATURATION: 98 % | HEIGHT: 70 IN

## 2025-05-27 VITALS
RESPIRATION RATE: 18 BRPM | TEMPERATURE: 98 F | OXYGEN SATURATION: 99 % | SYSTOLIC BLOOD PRESSURE: 163 MMHG | DIASTOLIC BLOOD PRESSURE: 74 MMHG | HEART RATE: 60 BPM

## 2025-05-27 PROBLEM — I10 ESSENTIAL (PRIMARY) HYPERTENSION: Chronic | Status: ACTIVE | Noted: 2022-11-16

## 2025-05-27 LAB
ALBUMIN SERPL ELPH-MCNC: 4.5 G/DL — SIGNIFICANT CHANGE UP (ref 3.3–5)
ALP SERPL-CCNC: 80 U/L — SIGNIFICANT CHANGE UP (ref 40–120)
ALT FLD-CCNC: 38 U/L — SIGNIFICANT CHANGE UP (ref 10–45)
ANION GAP SERPL CALC-SCNC: 13 MMOL/L — SIGNIFICANT CHANGE UP (ref 5–17)
AST SERPL-CCNC: 23 U/L — SIGNIFICANT CHANGE UP (ref 10–40)
BILIRUB SERPL-MCNC: 0.7 MG/DL — SIGNIFICANT CHANGE UP (ref 0.2–1.2)
BUN SERPL-MCNC: 19 MG/DL — SIGNIFICANT CHANGE UP (ref 7–23)
CALCIUM SERPL-MCNC: 9.6 MG/DL — SIGNIFICANT CHANGE UP (ref 8.4–10.5)
CHLORIDE SERPL-SCNC: 103 MMOL/L — SIGNIFICANT CHANGE UP (ref 96–108)
CO2 SERPL-SCNC: 23 MMOL/L — SIGNIFICANT CHANGE UP (ref 22–31)
CREAT SERPL-MCNC: 0.9 MG/DL — SIGNIFICANT CHANGE UP (ref 0.5–1.3)
EGFR: 87 ML/MIN/1.73M2 — SIGNIFICANT CHANGE UP
EGFR: 87 ML/MIN/1.73M2 — SIGNIFICANT CHANGE UP
GLUCOSE SERPL-MCNC: 114 MG/DL — HIGH (ref 70–99)
HCT VFR BLD CALC: 47.8 % — SIGNIFICANT CHANGE UP (ref 39–50)
HGB BLD-MCNC: 16.1 G/DL — SIGNIFICANT CHANGE UP (ref 13–17)
MCHC RBC-ENTMCNC: 29.5 PG — SIGNIFICANT CHANGE UP (ref 27–34)
MCHC RBC-ENTMCNC: 33.7 G/DL — SIGNIFICANT CHANGE UP (ref 32–36)
MCV RBC AUTO: 87.5 FL — SIGNIFICANT CHANGE UP (ref 80–100)
NRBC BLD AUTO-RTO: 0 /100 WBCS — SIGNIFICANT CHANGE UP (ref 0–0)
PLATELET # BLD AUTO: 171 K/UL — SIGNIFICANT CHANGE UP (ref 150–400)
POTASSIUM SERPL-MCNC: 3.9 MMOL/L — SIGNIFICANT CHANGE UP (ref 3.5–5.3)
POTASSIUM SERPL-SCNC: 3.9 MMOL/L — SIGNIFICANT CHANGE UP (ref 3.5–5.3)
PROT SERPL-MCNC: 7.3 G/DL — SIGNIFICANT CHANGE UP (ref 6–8.3)
RBC # BLD: 5.46 M/UL — SIGNIFICANT CHANGE UP (ref 4.2–5.8)
RBC # FLD: 12.5 % — SIGNIFICANT CHANGE UP (ref 10.3–14.5)
SODIUM SERPL-SCNC: 139 MMOL/L — SIGNIFICANT CHANGE UP (ref 135–145)
WBC # BLD: 5.71 K/UL — SIGNIFICANT CHANGE UP (ref 3.8–10.5)
WBC # FLD AUTO: 5.71 K/UL — SIGNIFICANT CHANGE UP (ref 3.8–10.5)

## 2025-05-27 PROCEDURE — 80053 COMPREHEN METABOLIC PANEL: CPT

## 2025-05-27 PROCEDURE — 99285 EMERGENCY DEPT VISIT HI MDM: CPT | Mod: 25

## 2025-05-27 PROCEDURE — 87476 LYME DIS DNA AMP PROBE: CPT

## 2025-05-27 PROCEDURE — 93971 EXTREMITY STUDY: CPT

## 2025-05-27 PROCEDURE — 73522 X-RAY EXAM HIPS BI 3-4 VIEWS: CPT

## 2025-05-27 PROCEDURE — 73522 X-RAY EXAM HIPS BI 3-4 VIEWS: CPT | Mod: 26

## 2025-05-27 PROCEDURE — 73590 X-RAY EXAM OF LOWER LEG: CPT

## 2025-05-27 PROCEDURE — 85027 COMPLETE CBC AUTOMATED: CPT

## 2025-05-27 PROCEDURE — 99284 EMERGENCY DEPT VISIT MOD MDM: CPT

## 2025-05-27 PROCEDURE — 93971 EXTREMITY STUDY: CPT | Mod: 26,LT

## 2025-05-27 PROCEDURE — 73590 X-RAY EXAM OF LOWER LEG: CPT | Mod: 26,LT

## 2025-05-27 NOTE — ED ADULT NURSE NOTE - OBJECTIVE STATEMENT
80 yo male presents to the ED aaox4 ambulatory with complaints of L hip pain s/p gardening. Pt states was doing 2 hours of gardening for 3 days about 2 weeks ago. pt Denies headache, dizziness, vision changes, chest pain, shortness of breath, abdominal pain, nausea, vomiting, diarrhea, fevers, chills, dysuria, hematuria, recent illness travel or fall.

## 2025-05-27 NOTE — ED PROVIDER NOTE - PATIENT PORTAL LINK FT
You can access the FollowMyHealth Patient Portal offered by [facility] by registering at the following website: [email or web address]. By joining [facility]’s FollowMyHealth portal, you will also be able to view your health information using other applications (apps) compatible with our system.

## 2025-05-27 NOTE — ED PROVIDER NOTE - ATTENDING APP SHARED VISIT CONTRIBUTION OF CARE
PMD Boxer pcp/lula  79-year-old  male PMH hypertension on amlodipine comes to ER complaints of hip pain.  Patient states symptoms began 3 weeks ago when he was doing gardening 2 hours a day for 3 days, after which he had right hip and leg pain.  Though symptoms have since resolved.  Approximate week ago developed left hip and calf pain.  Symptoms are worse with standing sitting walking, or almost completely relieved by lying down, there is no fever chills chest pain short of breath, history of trauma, DM, HLD, CA, CVA, CAD, anticoagulants.  Physical exam adult male awake and alert NAD.  Looking younger than stated age.  HEENT normocephalic atraumatic  CV no rubs gallops murmurs.  Chest clear A&P.  Abdomen soft positive bowel sounds.  MSK full range of motion bilateral hips knees ankles.  Distal neurovascular intact.  No rash swelling, abnormal warmth.  Doron Mireles MD, Facep

## 2025-05-27 NOTE — ED PROVIDER NOTE - CLINICAL SUMMARY MEDICAL DECISION MAKING FREE TEXT BOX
Elderly male history of hypertension presents with 3 weeks worth of arthritic complaints, first right hip and leg which resolved, now left hip and leg.  There is no direct trauma, no fever chills, on exam hips full range of motion no rash no swelling no tenderness.  Most consistent with arthritic diagnosis plan basic labs, Lyme titer, x-rays and a Doppler left lower extremity.  If all negative discharge to outpatient follow-up  Doron Mireles MD, Facep

## 2025-05-27 NOTE — ED PROVIDER NOTE - CONDITION AT DISCHARGE:
Render In Strict Bullet Format?: No Detail Level: Zone Initiate Treatment: Ketoconazole cream 2% apply thin layer to roughness on face twice daily until calm Improved

## 2025-05-27 NOTE — ED PROVIDER NOTE - NSFOLLOWUPINSTRUCTIONS_ED_ALL_ED_FT
Rest, heat,  tylenol 650mg every 6 hours hours for pain as needed. Follow up with Sports Medicine in 2-3 days. [phone] or with your primary doctor. Return to ER for increased pain, weakness, fever, redness, and tingling/numbness

## 2025-05-27 NOTE — ED PROVIDER NOTE - OBJECTIVE STATEMENT
78 yo M with pmhx htn presenting with bl hip and calf pain sp gardening three weeks ago. Patient states three weeks ago he was gardening for two hours a  day for three days straight. After that

## 2025-05-29 LAB — B BURGDOR DNA SPEC QL NAA+PROBE: NEGATIVE — SIGNIFICANT CHANGE UP

## 2025-06-03 ENCOUNTER — APPOINTMENT (OUTPATIENT)
Dept: ORTHOPEDIC SURGERY | Facility: CLINIC | Age: 80
End: 2025-06-03
Payer: MEDICARE

## 2025-06-03 VITALS
BODY MASS INDEX: 22.49 KG/M2 | SYSTOLIC BLOOD PRESSURE: 154 MMHG | DIASTOLIC BLOOD PRESSURE: 83 MMHG | HEIGHT: 65 IN | WEIGHT: 135 LBS | HEART RATE: 99 BPM

## 2025-06-03 DIAGNOSIS — M79.605 PAIN IN LEFT LEG: ICD-10-CM

## 2025-06-03 PROCEDURE — 99203 OFFICE O/P NEW LOW 30 MIN: CPT

## 2025-06-03 RX ORDER — NABUMETONE 500 MG/1
500 TABLET, FILM COATED ORAL
Qty: 60 | Refills: 0 | Status: ACTIVE | COMMUNITY
Start: 2025-06-03 | End: 1900-01-01

## 2025-06-03 RX ORDER — NABUMETONE 500 MG/1
500 TABLET, FILM COATED ORAL
Qty: 60 | Refills: 0 | Status: DISCONTINUED | COMMUNITY
Start: 2025-06-03 | End: 2025-06-03

## 2025-06-24 ENCOUNTER — APPOINTMENT (OUTPATIENT)
Dept: ORTHOPEDIC SURGERY | Facility: CLINIC | Age: 80
End: 2025-06-24
Payer: MEDICARE

## 2025-06-24 VITALS
SYSTOLIC BLOOD PRESSURE: 151 MMHG | WEIGHT: 135 LBS | HEIGHT: 65 IN | HEART RATE: 86 BPM | BODY MASS INDEX: 22.49 KG/M2 | DIASTOLIC BLOOD PRESSURE: 80 MMHG

## 2025-06-24 PROCEDURE — 99213 OFFICE O/P EST LOW 20 MIN: CPT

## 2025-07-22 ENCOUNTER — APPOINTMENT (OUTPATIENT)
Dept: ORTHOPEDIC SURGERY | Facility: CLINIC | Age: 80
End: 2025-07-22

## (undated) DEVICE — CLAMP BX HOT RAD JAW 3

## (undated) DEVICE — Device

## (undated) DEVICE — PACK IV START WITH CHG

## (undated) DEVICE — CATH IV SAFE BC 20G X 1.16" (PINK)

## (undated) DEVICE — IRRIGATOR BIO SHIELD

## (undated) DEVICE — TUBING IV SET GRAVITY 3Y 100" MACRO

## (undated) DEVICE — POLY TRAP ETRAP

## (undated) DEVICE — ELCTR GROUNDING PAD ADULT COVIDIEN

## (undated) DEVICE — SUCTION YANKAUER NO CONTROL VENT

## (undated) DEVICE — SOL INJ NS 0.9% 500ML 2 PORT

## (undated) DEVICE — SENSOR O2 FINGER ADULT

## (undated) DEVICE — TUBING SUCTION 20FT

## (undated) DEVICE — FOLEY HOLDER STATLOCK 2 WAY ADULT

## (undated) DEVICE — CATH IV SAFE BC 22G X 1" (BLUE)

## (undated) DEVICE — TUBING SUCTION CONN 6FT STERILE

## (undated) DEVICE — TUBING CAP SET ENDO 24HR USE GI